# Patient Record
Sex: FEMALE | Race: WHITE | Employment: UNEMPLOYED | URBAN - NONMETROPOLITAN AREA
[De-identification: names, ages, dates, MRNs, and addresses within clinical notes are randomized per-mention and may not be internally consistent; named-entity substitution may affect disease eponyms.]

---

## 2017-09-23 ENCOUNTER — APPOINTMENT (OUTPATIENT)
Dept: CT IMAGING | Age: 32
End: 2017-09-23
Payer: COMMERCIAL

## 2017-09-23 ENCOUNTER — HOSPITAL ENCOUNTER (EMERGENCY)
Age: 32
Discharge: HOME | End: 2017-09-23
Attending: EMERGENCY MEDICINE
Payer: COMMERCIAL

## 2017-09-23 VITALS
DIASTOLIC BLOOD PRESSURE: 66 MMHG | RESPIRATION RATE: 16 BRPM | WEIGHT: 131 LBS | OXYGEN SATURATION: 99 % | TEMPERATURE: 98.3 F | HEIGHT: 65 IN | HEART RATE: 90 BPM | BODY MASS INDEX: 21.83 KG/M2 | SYSTOLIC BLOOD PRESSURE: 101 MMHG

## 2017-09-23 DIAGNOSIS — R10.84 GENERALIZED ABDOMINAL PAIN: ICD-10-CM

## 2017-09-23 DIAGNOSIS — K51.00 ULCERATIVE PANCOLITIS WITHOUT COMPLICATION (HCC): Primary | ICD-10-CM

## 2017-09-23 LAB
ALBUMIN SERPL-MCNC: 3.3 GM/DL (ref 3.5–5)
ALP BLD-CCNC: 81 U/L (ref 46–116)
ALT SERPL-CCNC: 23 U/L (ref 12–78)
AMORPHOUS: ABNORMAL
ANION GAP: 11 MEQ/L (ref 8–16)
AST SERPL-CCNC: 24 U/L (ref 15–37)
BACTERIA: ABNORMAL
BASOPHILS # BLD: 0.6 % (ref 0–3)
BILIRUB SERPL-MCNC: 0.4 MG/DL (ref 0.2–1)
BILIRUBIN URINE: ABNORMAL
BLOOD, URINE: NEGATIVE
BUN BLDV-MCNC: 9 MG/DL (ref 7–18)
CASTS UA: ABNORMAL /LPF
CHARACTER, URINE: ABNORMAL
CHLORIDE BLD-SCNC: 102 MEQ/L (ref 98–107)
CO2: 26 MEQ/L (ref 21–32)
COLOR: YELLOW
CREAT SERPL-MCNC: 1.2 MG/DL (ref 0.6–1.1)
CRYSTALS, UA: ABNORMAL
EOSINOPHILS RELATIVE PERCENT: 14.4 % (ref 0–4)
EPITHELIAL CELLS, UA: ABNORMAL /HPF
GFR, ESTIMATED: 55 ML/MIN/1.73M2
GLUCOSE BLD-MCNC: 90 MG/DL (ref 74–106)
GLUCOSE, URINE: NEGATIVE MG/DL
HCT VFR BLD CALC: 33.2 % (ref 37–47)
HEMOGLOBIN: 10.7 GM/DL (ref 12–16)
ICTOTEST: NEGATIVE
KETONES, URINE: ABNORMAL
LEUKOCYTE ESTERASE, URINE: NEGATIVE
LYMPHOCYTES # BLD: 34.8 % (ref 15–47)
MCH RBC QN AUTO: 27.3 PG (ref 27–31)
MCHC RBC AUTO-ENTMCNC: 32.1 GM/DL (ref 33–37)
MCV RBC AUTO: 85.1 FL (ref 81–99)
MONOCYTES: 11.7 % (ref 0–12)
MUCUS: ABNORMAL
NITRITE, URINE: NEGATIVE
PDW BLD-RTO: 13.4 % (ref 11.5–14.5)
PH UA: 5.5 (ref 5–9)
PLATELET # BLD: 357 THOU/MM3 (ref 130–400)
PMV BLD AUTO: 8.1 MCM (ref 7.4–10.4)
POC CALCIUM: 8.7 MG/DL (ref 8.5–10.1)
POTASSIUM SERPL-SCNC: 3.7 MEQ/L (ref 3.5–5.1)
PREGNANCY, URINE: NEGATIVE
PROTEIN UA: ABNORMAL MG/DL
RBC # BLD: 3.9 MILL/MM3 (ref 4.2–5.4)
RBC # BLD: NORMAL 10*6/UL
RBC UA: ABNORMAL /HPF
REFLEX TO URINE C & S: ABNORMAL
SEGS: 38.5 % (ref 43–75)
SODIUM BLD-SCNC: 139 MEQ/L (ref 136–145)
SPECIFIC GRAVITY UA: >= 1.03 (ref 1–1.03)
TOTAL PROTEIN: 7.1 GM/DL (ref 6.4–8.2)
UROBILINOGEN, URINE: 0.2 EU/DL (ref 0–1)
WBC # BLD: 6 THOU/MM3 (ref 4.8–10.8)
WBC UA: ABNORMAL /HPF

## 2017-09-23 PROCEDURE — 81001 URINALYSIS AUTO W/SCOPE: CPT

## 2017-09-23 PROCEDURE — 85025 COMPLETE CBC W/AUTO DIFF WBC: CPT

## 2017-09-23 PROCEDURE — 74177 CT ABD & PELVIS W/CONTRAST: CPT

## 2017-09-23 PROCEDURE — 96375 TX/PRO/DX INJ NEW DRUG ADDON: CPT

## 2017-09-23 PROCEDURE — 99284 EMERGENCY DEPT VISIT MOD MDM: CPT

## 2017-09-23 PROCEDURE — 80053 COMPREHEN METABOLIC PANEL: CPT

## 2017-09-23 PROCEDURE — 84703 CHORIONIC GONADOTROPIN ASSAY: CPT

## 2017-09-23 PROCEDURE — 96361 HYDRATE IV INFUSION ADD-ON: CPT

## 2017-09-23 PROCEDURE — 6360000002 HC RX W HCPCS: Performed by: EMERGENCY MEDICINE

## 2017-09-23 PROCEDURE — 2580000003 HC RX 258: Performed by: EMERGENCY MEDICINE

## 2017-09-23 PROCEDURE — 87086 URINE CULTURE/COLONY COUNT: CPT

## 2017-09-23 PROCEDURE — 6360000004 HC RX CONTRAST MEDICATION: Performed by: EMERGENCY MEDICINE

## 2017-09-23 PROCEDURE — 96374 THER/PROPH/DIAG INJ IV PUSH: CPT

## 2017-09-23 PROCEDURE — 36415 COLL VENOUS BLD VENIPUNCTURE: CPT

## 2017-09-23 RX ORDER — HYDROCODONE BITARTRATE AND ACETAMINOPHEN 5; 325 MG/1; MG/1
1 TABLET ORAL ONCE
Status: DISCONTINUED | OUTPATIENT
Start: 2017-09-23 | End: 2017-09-23 | Stop reason: HOSPADM

## 2017-09-23 RX ORDER — 0.9 % SODIUM CHLORIDE 0.9 %
1000 INTRAVENOUS SOLUTION INTRAVENOUS ONCE
Status: COMPLETED | OUTPATIENT
Start: 2017-09-23 | End: 2017-09-23

## 2017-09-23 RX ORDER — MORPHINE SULFATE 4 MG/ML
4 INJECTION, SOLUTION INTRAMUSCULAR; INTRAVENOUS ONCE
Status: COMPLETED | OUTPATIENT
Start: 2017-09-23 | End: 2017-09-23

## 2017-09-23 RX ORDER — ONDANSETRON 2 MG/ML
4 INJECTION INTRAMUSCULAR; INTRAVENOUS ONCE
Status: COMPLETED | OUTPATIENT
Start: 2017-09-23 | End: 2017-09-23

## 2017-09-23 RX ADMIN — SODIUM CHLORIDE 1000 ML: 9 INJECTION, SOLUTION INTRAVENOUS at 11:35

## 2017-09-23 RX ADMIN — IOPAMIDOL 100 ML: 755 INJECTION, SOLUTION INTRAVENOUS at 12:52

## 2017-09-23 RX ADMIN — ONDANSETRON 4 MG: 2 INJECTION INTRAMUSCULAR; INTRAVENOUS at 11:37

## 2017-09-23 RX ADMIN — MORPHINE SULFATE 4 MG: 4 INJECTION, SOLUTION INTRAMUSCULAR; INTRAVENOUS at 11:39

## 2017-09-23 RX ADMIN — IOHEXOL 50 ML: 240 INJECTION, SOLUTION INTRATHECAL; INTRAVASCULAR; INTRAVENOUS; ORAL at 12:52

## 2017-09-23 ASSESSMENT — ENCOUNTER SYMPTOMS
BLOOD IN STOOL: 1
SHORTNESS OF BREATH: 0
DIARRHEA: 1
NAUSEA: 0
ABDOMINAL PAIN: 1
WHEEZING: 0
SORE THROAT: 0
VOMITING: 0

## 2017-09-23 ASSESSMENT — PAIN SCALES - GENERAL
PAINLEVEL_OUTOF10: 6

## 2017-09-23 ASSESSMENT — PAIN DESCRIPTION - DESCRIPTORS
DESCRIPTORS: CRAMPING
DESCRIPTORS: CRAMPING;ACHING

## 2017-09-23 ASSESSMENT — PAIN - FUNCTIONAL ASSESSMENT: PAIN_FUNCTIONAL_ASSESSMENT: 0-10

## 2017-09-23 ASSESSMENT — PAIN DESCRIPTION - ORIENTATION
ORIENTATION: RIGHT;LEFT;UPPER
ORIENTATION: LEFT;RIGHT;UPPER

## 2017-09-23 ASSESSMENT — PAIN DESCRIPTION - LOCATION
LOCATION: ABDOMEN
LOCATION: ABDOMEN

## 2017-09-25 LAB
ORGANISM: ABNORMAL
URINE CULTURE REFLEX: ABNORMAL

## 2017-11-10 ENCOUNTER — HOSPITAL ENCOUNTER (OUTPATIENT)
Dept: ULTRASOUND IMAGING | Age: 32
Discharge: HOME | End: 2017-11-10
Payer: COMMERCIAL

## 2017-11-10 DIAGNOSIS — R52 PAIN: ICD-10-CM

## 2017-11-10 LAB
ANISOCYTOSIS: ABNORMAL
BASOPHILIA: SLIGHT
BASOPHILS # BLD: 1 %
BASOPHILS ABSOLUTE: 0.1 THOU/MM3 (ref 0–0.1)
EOSINOPHIL # BLD: 7.6 %
EOSINOPHILS ABSOLUTE: 0.7 THOU/MM3 (ref 0–0.4)
HCT VFR BLD CALC: 26.5 % (ref 37–47)
HEMOGLOBIN: 8.3 GM/DL (ref 12–16)
HYPOCHROMIA: ABNORMAL
LYMPHOCYTES # BLD: 45.2 %
LYMPHOCYTES ABSOLUTE: 3.9 THOU/MM3 (ref 1–4.8)
MCH RBC QN AUTO: 23.6 PG (ref 27–31)
MCHC RBC AUTO-ENTMCNC: 31.5 GM/DL (ref 33–37)
MCV RBC AUTO: 75.1 FL (ref 81–99)
MICROCYTES: ABNORMAL
MONOCYTES # BLD: 10.4 %
MONOCYTES ABSOLUTE: 0.9 THOU/MM3 (ref 0.4–1.3)
NUCLEATED RED BLOOD CELLS: 0 /100 WBC
PDW BLD-RTO: 17.7 % (ref 11.5–14.5)
PLATELET # BLD: 409 THOU/MM3 (ref 130–400)
PLATELET ESTIMATE: ADEQUATE
PMV BLD AUTO: 8.5 MCM (ref 7.4–10.4)
RBC # BLD: 3.53 MILL/MM3 (ref 4.2–5.4)
SCAN OF BLOOD SMEAR: NORMAL
SEG NEUTROPHILS: 35.8 %
SEGMENTED NEUTROPHILS ABSOLUTE COUNT: 3.1 THOU/MM3 (ref 1.8–7.7)
TARGET CELLS: ABNORMAL
TSH SERPL DL<=0.05 MIU/L-ACNC: 1.7 UIU/ML (ref 0.4–4.2)
WBC # BLD: 8.6 THOU/MM3 (ref 4.8–10.8)

## 2017-11-10 PROCEDURE — 36415 COLL VENOUS BLD VENIPUNCTURE: CPT

## 2017-11-10 PROCEDURE — 85025 COMPLETE CBC W/AUTO DIFF WBC: CPT

## 2017-11-10 PROCEDURE — 84443 ASSAY THYROID STIM HORMONE: CPT

## 2017-11-10 PROCEDURE — 76856 US EXAM PELVIC COMPLETE: CPT

## 2017-11-28 ENCOUNTER — HOSPITAL ENCOUNTER (EMERGENCY)
Age: 32
Discharge: HOME | End: 2017-11-29
Attending: FAMILY MEDICINE
Payer: COMMERCIAL

## 2017-11-28 ENCOUNTER — APPOINTMENT (OUTPATIENT)
Dept: GENERAL RADIOLOGY | Age: 32
End: 2017-11-28
Payer: COMMERCIAL

## 2017-11-28 DIAGNOSIS — D64.9 CHRONIC ANEMIA: Primary | ICD-10-CM

## 2017-11-28 LAB
ALBUMIN SERPL-MCNC: 3.1 GM/DL (ref 3.5–5)
ALP BLD-CCNC: 74 U/L (ref 46–116)
ALT SERPL-CCNC: 22 U/L (ref 12–78)
ANION GAP: 9 MEQ/L (ref 8–16)
AST SERPL-CCNC: 20 U/L (ref 15–37)
BILIRUB SERPL-MCNC: 0.2 MG/DL (ref 0.2–1)
BUN BLDV-MCNC: 12 MG/DL (ref 7–18)
CHLORIDE BLD-SCNC: 101 MEQ/L (ref 98–107)
CO2: 26 MEQ/L (ref 21–32)
CREAT SERPL-MCNC: 1.1 MG/DL (ref 0.6–1.1)
EKG ATRIAL RATE: 75 BPM
EKG P AXIS: 67 DEGREES
EKG P-R INTERVAL: 142 MS
EKG Q-T INTERVAL: 404 MS
EKG QRS DURATION: 90 MS
EKG QTC CALCULATION (BAZETT): 451 MS
EKG R AXIS: 45 DEGREES
EKG T AXIS: 54 DEGREES
EKG VENTRICULAR RATE: 75 BPM
GFR, ESTIMATED: 61 ML/MIN/1.73M2
GLUCOSE BLD-MCNC: 88 MG/DL (ref 74–106)
LIPASE: 230 U/L (ref 65–230)
POC CALCIUM: 8.1 MG/DL (ref 8.5–10.1)
POTASSIUM SERPL-SCNC: 3.8 MEQ/L (ref 3.5–5.1)
PREGNANCY, SERUM: NEGATIVE
SODIUM BLD-SCNC: 136 MEQ/L (ref 136–145)
TOTAL PROTEIN: 6.9 GM/DL (ref 6.4–8.2)
TROPONIN I: 0.05 NG/ML

## 2017-11-28 PROCEDURE — 96374 THER/PROPH/DIAG INJ IV PUSH: CPT

## 2017-11-28 PROCEDURE — 93005 ELECTROCARDIOGRAM TRACING: CPT

## 2017-11-28 PROCEDURE — 84703 CHORIONIC GONADOTROPIN ASSAY: CPT

## 2017-11-28 PROCEDURE — 83690 ASSAY OF LIPASE: CPT

## 2017-11-28 PROCEDURE — 36415 COLL VENOUS BLD VENIPUNCTURE: CPT

## 2017-11-28 PROCEDURE — 84484 ASSAY OF TROPONIN QUANT: CPT

## 2017-11-28 PROCEDURE — 6360000002 HC RX W HCPCS: Performed by: FAMILY MEDICINE

## 2017-11-28 PROCEDURE — 80053 COMPREHEN METABOLIC PANEL: CPT

## 2017-11-28 PROCEDURE — 71020 XR CHEST STANDARD TWO VW: CPT

## 2017-11-28 PROCEDURE — 99285 EMERGENCY DEPT VISIT HI MDM: CPT

## 2017-11-28 PROCEDURE — 85025 COMPLETE CBC W/AUTO DIFF WBC: CPT

## 2017-11-28 RX ORDER — DIPHENHYDRAMINE HYDROCHLORIDE 50 MG/ML
50 INJECTION INTRAMUSCULAR; INTRAVENOUS ONCE
Status: COMPLETED | OUTPATIENT
Start: 2017-11-28 | End: 2017-11-28

## 2017-11-28 RX ADMIN — DIPHENHYDRAMINE HYDROCHLORIDE 50 MG: 50 INJECTION, SOLUTION INTRAMUSCULAR; INTRAVENOUS at 23:45

## 2017-11-28 ASSESSMENT — ENCOUNTER SYMPTOMS
EYE DISCHARGE: 0
VOMITING: 0
BACK PAIN: 0
NAUSEA: 0
DIARRHEA: 0
ABDOMINAL PAIN: 0
EYE PAIN: 0
WHEEZING: 0
SORE THROAT: 0
RHINORRHEA: 0
COUGH: 0

## 2017-11-28 ASSESSMENT — PAIN DESCRIPTION - LOCATION: LOCATION: CHEST

## 2017-11-28 ASSESSMENT — PAIN SCALES - GENERAL: PAINLEVEL_OUTOF10: 7

## 2017-11-28 ASSESSMENT — PAIN DESCRIPTION - PAIN TYPE: TYPE: ACUTE PAIN

## 2017-11-29 ENCOUNTER — HOSPITAL ENCOUNTER (EMERGENCY)
Age: 32
Discharge: HOME | End: 2017-11-29
Payer: COMMERCIAL

## 2017-11-29 VITALS
TEMPERATURE: 98.4 F | OXYGEN SATURATION: 100 % | HEART RATE: 84 BPM | SYSTOLIC BLOOD PRESSURE: 117 MMHG | DIASTOLIC BLOOD PRESSURE: 82 MMHG | BODY MASS INDEX: 21.63 KG/M2 | WEIGHT: 130 LBS | RESPIRATION RATE: 18 BRPM

## 2017-11-29 VITALS
DIASTOLIC BLOOD PRESSURE: 83 MMHG | HEIGHT: 65 IN | TEMPERATURE: 98.2 F | HEART RATE: 79 BPM | SYSTOLIC BLOOD PRESSURE: 117 MMHG | RESPIRATION RATE: 14 BRPM | WEIGHT: 125 LBS | BODY MASS INDEX: 20.83 KG/M2 | OXYGEN SATURATION: 100 %

## 2017-11-29 DIAGNOSIS — D50.9 IRON DEFICIENCY ANEMIA, UNSPECIFIED IRON DEFICIENCY ANEMIA TYPE: Primary | ICD-10-CM

## 2017-11-29 LAB
ABO: NORMAL
ALBUMIN SERPL-MCNC: 4.5 G/DL (ref 3.5–5.1)
ALP BLD-CCNC: 87 U/L (ref 38–126)
ALT SERPL-CCNC: 14 U/L (ref 11–66)
ANION GAP SERPL CALCULATED.3IONS-SCNC: 10 MEQ/L (ref 8–16)
ANISOCYTOSIS: ABNORMAL
ANTIBODY SCREEN: NORMAL
AST SERPL-CCNC: 24 U/L (ref 5–40)
BASOPHILIA: SLIGHT
BASOPHILS # BLD: 0.5 % (ref 0–3)
BASOPHILS # BLD: 0.6 %
BASOPHILS ABSOLUTE: 0 THOU/MM3 (ref 0–0.1)
BILIRUB SERPL-MCNC: 0.2 MG/DL (ref 0.3–1.2)
BUN BLDV-MCNC: 13 MG/DL (ref 7–22)
CALCIUM SERPL-MCNC: 9.6 MG/DL (ref 8.5–10.5)
CHLORIDE BLD-SCNC: 100 MEQ/L (ref 98–111)
CO2: 28 MEQ/L (ref 23–33)
CREAT SERPL-MCNC: 1 MG/DL (ref 0.4–1.2)
DIFFERENTIAL, AUTO: ABNORMAL
EKG ATRIAL RATE: 89 BPM
EKG P AXIS: 75 DEGREES
EKG P-R INTERVAL: 140 MS
EKG Q-T INTERVAL: 382 MS
EKG QRS DURATION: 86 MS
EKG QTC CALCULATION (BAZETT): 464 MS
EKG R AXIS: 53 DEGREES
EKG T AXIS: 32 DEGREES
EKG VENTRICULAR RATE: 89 BPM
EOSINOPHIL # BLD: 3.7 %
EOSINOPHILS ABSOLUTE: 0.3 THOU/MM3 (ref 0–0.4)
EOSINOPHILS RELATIVE PERCENT: 6.8 % (ref 0–4)
GFR SERPL CREATININE-BSD FRML MDRD: 64 ML/MIN/1.73M2
GLUCOSE BLD-MCNC: 91 MG/DL (ref 70–108)
GROUP A STREP CULTURE, REFLEX: NEGATIVE
HCT VFR BLD CALC: 23.6 % (ref 37–47)
HCT VFR BLD CALC: 27.5 % (ref 37–47)
HEMOGLOBIN: 7.4 GM/DL (ref 12–16)
HEMOGLOBIN: 8.6 GM/DL (ref 12–16)
HYPOCHROMIA: ABNORMAL
LYMPHOCYTES # BLD: 39.1 %
LYMPHOCYTES # BLD: 56.3 % (ref 15–47)
LYMPHOCYTES ABSOLUTE: 2.7 THOU/MM3 (ref 1–4.8)
MCH RBC QN AUTO: 21.6 PG (ref 27–31)
MCH RBC QN AUTO: 22.1 PG (ref 27–31)
MCHC RBC AUTO-ENTMCNC: 31.1 GM/DL (ref 33–37)
MCHC RBC AUTO-ENTMCNC: 31.3 GM/DL (ref 33–37)
MCV RBC AUTO: 69.2 FL (ref 81–99)
MCV RBC AUTO: 70.8 FL (ref 81–99)
MICROCYTES: ABNORMAL
MONOCYTES # BLD: 8.6 %
MONOCYTES ABSOLUTE: 0.6 THOU/MM3 (ref 0.4–1.3)
MONOCYTES: 8.9 % (ref 0–12)
NUCLEATED RED BLOOD CELLS: 0 /100 WBC
OSMOLALITY CALCULATION: 275.4 MOSMOL/KG (ref 275–300)
PATHOLOGIST REVIEW: ABNORMAL
PDW BLD-RTO: 14.9 % (ref 11.5–14.5)
PDW BLD-RTO: 18.5 % (ref 11.5–14.5)
PLATELET # BLD: 383 THOU/MM3 (ref 130–400)
PLATELET # BLD: 448 THOU/MM3 (ref 130–400)
PLATELET ESTIMATE: ABNORMAL
PLATELET ESTIMATE: ADEQUATE
PMV BLD AUTO: 7.9 MCM (ref 7.4–10.4)
PMV BLD AUTO: 8.6 MCM (ref 7.4–10.4)
POTASSIUM SERPL-SCNC: 4.2 MEQ/L (ref 3.5–5.2)
RBC # BLD: 3.41 MILL/MM3 (ref 4.2–5.4)
RBC # BLD: 3.88 MILL/MM3 (ref 4.2–5.4)
RBC # BLD: ABNORMAL 10*6/UL
REFLEX THROAT C + S: NORMAL
RH FACTOR: NORMAL
SCAN OF BLOOD SMEAR: NORMAL
SCAN OF BLOOD SMEAR: NORMAL
SEG NEUTROPHILS: 48 %
SEGMENTED NEUTROPHILS ABSOLUTE COUNT: 3.4 THOU/MM3 (ref 1.8–7.7)
SEGS: 27.5 % (ref 43–75)
SODIUM BLD-SCNC: 138 MEQ/L (ref 135–145)
TARGET CELLS: ABNORMAL
TOTAL PROTEIN: 7.9 G/DL (ref 6.1–8)
WBC # BLD: 6.1 THOU/MM3 (ref 4.8–10.8)
WBC # BLD: 7 THOU/MM3 (ref 4.8–10.8)

## 2017-11-29 PROCEDURE — 85025 COMPLETE CBC W/AUTO DIFF WBC: CPT

## 2017-11-29 PROCEDURE — 93005 ELECTROCARDIOGRAM TRACING: CPT

## 2017-11-29 PROCEDURE — 36415 COLL VENOUS BLD VENIPUNCTURE: CPT

## 2017-11-29 PROCEDURE — 86923 COMPATIBILITY TEST ELECTRIC: CPT

## 2017-11-29 PROCEDURE — 86901 BLOOD TYPING SEROLOGIC RH(D): CPT

## 2017-11-29 PROCEDURE — 87880 STREP A ASSAY W/OPTIC: CPT

## 2017-11-29 PROCEDURE — 87070 CULTURE OTHR SPECIMN AEROBIC: CPT

## 2017-11-29 PROCEDURE — 80053 COMPREHEN METABOLIC PANEL: CPT

## 2017-11-29 PROCEDURE — 86900 BLOOD TYPING SEROLOGIC ABO: CPT

## 2017-11-29 PROCEDURE — 36430 TRANSFUSION BLD/BLD COMPNT: CPT

## 2017-11-29 PROCEDURE — P9016 RBC LEUKOCYTES REDUCED: HCPCS

## 2017-11-29 PROCEDURE — 99284 EMERGENCY DEPT VISIT MOD MDM: CPT

## 2017-11-29 PROCEDURE — 86850 RBC ANTIBODY SCREEN: CPT

## 2017-11-29 RX ORDER — SODIUM CHLORIDE 0.9 % (FLUSH) 0.9 %
10 SYRINGE (ML) INJECTION PRN
Status: DISCONTINUED | OUTPATIENT
Start: 2017-11-29 | End: 2017-11-29 | Stop reason: HOSPADM

## 2017-11-29 RX ORDER — 0.9 % SODIUM CHLORIDE 0.9 %
250 INTRAVENOUS SOLUTION INTRAVENOUS ONCE
Status: DISCONTINUED | OUTPATIENT
Start: 2017-11-29 | End: 2017-11-29 | Stop reason: HOSPADM

## 2017-11-29 RX ORDER — SODIUM CHLORIDE 0.9 % (FLUSH) 0.9 %
10 SYRINGE (ML) INJECTION EVERY 12 HOURS
Status: DISCONTINUED | OUTPATIENT
Start: 2017-11-29 | End: 2017-11-29 | Stop reason: HOSPADM

## 2017-11-29 ASSESSMENT — ENCOUNTER SYMPTOMS
SHORTNESS OF BREATH: 1
BACK PAIN: 0
SHORTNESS OF BREATH: 1
CONSTIPATION: 0
EYE DISCHARGE: 0
ABDOMINAL PAIN: 0
EYE PAIN: 0
RHINORRHEA: 0
SORE THROAT: 0
BLOOD IN STOOL: 1
WHEEZING: 0
NAUSEA: 0
COUGH: 0
DIARRHEA: 0
VOMITING: 0

## 2017-11-29 ASSESSMENT — PAIN SCALES - GENERAL: PAINLEVEL_OUTOF10: 0

## 2017-11-29 NOTE — ED PROVIDER NOTES
[facility] EMERGENCY DEPT      CHIEF COMPLAINT       Chief Complaint   Patient presents with    Shortness of Breath    Other     hemoglobin 7.4       Nurses Notes reviewed and I agree except as noted in the HPI. HISTORY OF PRESENT ILLNESS    Anny Vazquez is a 28 y.o. female who presents to the ED for the evaluation of shortness of breath, Lightheadedness, and palpitations for the past month. Patient was seen for similar symptoms last evening at Urgent Care. Patient states that she has a history of chronic anemia and ulcerative colitis. She states that she went to Urgent Care last evening for shortness of breath, and that her hemoglobin was found to be 7.4 and her hematocrit was 23.6. This is a 1.0 were then 1 month ago. She reports that her hemoglobin is usually 10-11. Patient states that the Urgent Care doctor advised her to come to the ED last evening for a transfusion, but that she Could not due to childcare issues. She denies experiencing any chest pain, abdominal pain, bleeding, headache, fever, or weakness, but she does report that she has chronic hematochezia. She states that her last transfusion was in 2016 following giving birth to her last child. Patient reports that she believes that her began to experience symptoms following a Crohn's flare where she experienced more hematochezia than usual. She reports that her is managed at [address] [place] [address] for her ulcerative colitis, and that she was referred to a hematologist, but that her appointment is not until 12/22/17. She states that she's not pregnant because she is still nursing and has not menstruated since she gave birth. Patient denies further complaints at initial encounter. Location/Symptom: Shortness of breath and lightheadedness  Timing/Onset: Yesterday  Context/Setting: Patient has a history of chronic anemia and was advised to come to the ED for a blood transfusion.     REVIEW OF SYSTEMS     Review of Systems   Constitutional: Negative for appetite Exam   Constitutional: She is oriented to person, place, and time. Vital signs are normal. She appears well-developed and well-nourished. She is active and cooperative. Non-toxic appearance. No distress. HENT:   Head: Normocephalic and atraumatic. Right Ear: Hearing normal.   Left Ear: Hearing normal.   Nose: Nose normal. No rhinorrhea. Mouth/Throat: Uvula is midline, oropharynx is clear and moist and mucous membranes are normal. No oropharyngeal exudate. Eyes: Conjunctivae, EOM and lids are normal. Pupils are equal, round, and reactive to light. Right eye exhibits no exudate. Left eye exhibits no exudate. No scleral icterus. Neck: Normal range of motion. Neck supple. No neck rigidity. No tracheal deviation present. Cardiovascular: Normal rate, normal heart sounds, intact distal pulses and normal pulses. No murmur heard. Pulses:       Radial pulses are 2+ on the right side. Pulmonary/Chest: Effort normal and breath sounds normal. No accessory muscle usage or stridor. No respiratory distress. She has no decreased breath sounds. She has no wheezes. She has no rhonchi. She has no rales. Abdominal: Soft. Normal appearance. She exhibits no distension. There is no tenderness. There is no rigidity, no rebound and no guarding. Musculoskeletal: Normal range of motion. She exhibits no edema. Movement normal as observed   Lymphadenopathy:     She has no cervical adenopathy. Neurological: She is alert and oriented to person, place, and time. She has normal strength. She is not disoriented. No cranial nerve deficit. Gait normal. GCS eye subscore is 4. GCS verbal subscore is 5. GCS motor subscore is 6. No gross deficits observed   Skin: Skin is warm, dry and intact. No rash noted. She is not diaphoretic. There is pallor. Psychiatric: She has a normal mood and affect. Her speech is normal and behavior is normal. Thought content normal.   Nursing note and vitals reviewed.       DIFFERENTIAL DIAGNOSIS: but pale. Old records were reviewed. Within the department, I observed the patient's vital signs to be within acceptable range. Radiological studies Were not deemed necessary. Laboratory work was reassuring Showing improved hemoglobin at 8.6. Within the department, the patient was treated with Transfusion of packed red blood cells. I observed the patient's condition to modestly improve during the duration of their stay. I have considered  Arrhythmia and this patient's presentation is not consistent with such entities and therefore, no further testing was warranted. The patient was comfortable with the plan of discharge home and to follow up with Hernia hematologist at [address] [place] [address] and her PCP Dr. Cedillo, The latter in regards to scheduling an iron transfusion. Anticipatory guidance was given. The patient is instructed to return to the emergency department for any worsening of their symptoms. Patient was discharged from the emergency department in good condition with all questions answered. See disposition below. I have given the patient strict written and verbal instructions about care at home, follow-up, and signs and symptoms of worsening of condition and they did verbalize understanding. CRITICAL CARE:   None    CONSULTS:  None    PROCEDURES:  None    FINAL IMPRESSION      1. Iron deficiency anemia, unspecified iron deficiency anemia type          DISPOSITION/PLAN     1.  Iron deficiency anemia, unspecified iron deficiency anemia type        PATIENT REFERRED TO:  Damian Tatum MD  [address]  [phone]    Schedule an appointment as soon as possible for a visit   Discuss outpatient iron transfusion    your rheumatologist  [facility]    as scheduled sooner if worse      DISCHARGE MEDICATIONS:  Discharge Medication List as of 11/29/2017  3:26 PM          (Please note that portions of this note were completed with a voice recognition program.  Efforts were made to edit the

## 2017-11-29 NOTE — ED TRIAGE NOTES
Pt presents to  16 c/o SOB and being sent here from [place] ambulatory care. Pt was seen last night and her hemoglobin was 7.4 and pt was told to report here today to be rechecked and have a blood transfusion. PA student at bedside. VSS. Family at bedside. Call light in reach. Will monitor.

## 2017-11-29 NOTE — ED PROVIDER NOTES
nsaids. FAMILY HISTORY     has no family status information on file. family history is not on file. SOCIAL HISTORY      reports that she has never smoked. She has never used smokeless tobacco. She reports that she does not drink alcohol or use drugs. PHYSICAL EXAM     INITIAL VITALS:  weight is 130 lb (59 kg). Her oral temperature is 98.4 °F (36.9 °C). Her blood pressure is 125/86 and her pulse is 85. Her respiration is 20 and oxygen saturation is 100%. Physical Exam   Constitutional: She is oriented to person, place, and time. She appears well-developed and well-nourished. HENT:   Head: Normocephalic and atraumatic. Right Ear: External ear normal.   Left Ear: External ear normal.   Eyes: Conjunctivae are normal. Right eye exhibits no discharge. Left eye exhibits no discharge. No scleral icterus. Neck: Normal range of motion. No JVD present. No tracheal deviation present. No thyromegaly present. Cardiovascular: Normal rate and regular rhythm. Exam reveals no gallop and no friction rub. No murmur heard. Pulmonary/Chest: Effort normal and breath sounds normal. No stridor. No respiratory distress. She has no wheezes. She has no rales. Abdominal: Soft. She exhibits no distension. There is no tenderness. There is no rebound and no guarding. Musculoskeletal: Normal range of motion. She exhibits no edema or tenderness. Neurological: She is alert and oriented to person, place, and time. Coordination normal.   Skin: Skin is warm and dry. No rash (On exposed body surfaces) noted. She is not diaphoretic. Psychiatric: She has a normal mood and affect. Her behavior is normal. Thought content normal.   Nursing note and vitals reviewed.       DIFFERENTIAL DIAGNOSIS:   Chronic anemia    DIAGNOSTIC RESULTS     EKG: All EKG's are interpreted by the Emergency Department Physician who either signs or Co-signs this chart in the absence of a cardiologist.  EKG    The patient had an EKG which is OF BLOOD SMEAR       EMERGENCY DEPARTMENT COURSE:   Vitals:    Vitals:    11/28/17 2307 11/28/17 2313 11/28/17 2347 11/29/17 0015   BP: 116/78 114/72 112/69 125/86   Pulse: 77 77 78 85   Resp: 20      Temp: 98.4 °F (36.9 °C)      TempSrc: Oral      SpO2: 97% 100% 100% 100%   Weight: 130 lb (59 kg)        Patient seen and evaluated with a history of chronic anemia likely secondary to ulcerative colitis. Hemoglobin is found to be 7.4. She is having symptoms. She was offered transfer to [facility] for transfusion. She states that she would prefer to go in the morning. Patient discharged and advised to follow-up at the emergency department in the morning. CRITICAL CARE: There was a high probability of clinically significant/life threatening deterioration in this patient's condition which required my urgent intervention. Total critical care time was none minutes. This excludes any time for separately reportable procedures. CONSULTS:  None    PROCEDURES:  none    FINAL IMPRESSION      1.  Chronic anemia          DISPOSITION/PLAN   Discharge    PATIENT REFERRED TO:  Kaz Elise  [address] [place] [address]  [phone]          DISCHARGE MEDICATIONS:  New Prescriptions    No medications on file       (Please note that portions of this note were completed with a voice recognition program.  Efforts were made to edit the dictations but occasionally words are mis-transcribed.)    MD Marek Craig MD  11/29/17 0709

## 2017-11-29 NOTE — ED TRIAGE NOTES
Pt c/o rapid heartrate along with hx of anemia, states possible allergic reaction to new live Symtext tree, throat is sore/scratchy, pt skin w/d, resp easy, lungs clear t/o bilaterally, heart sounds regular, no edema noted, abd soft/non-tender, no acute distress noted

## 2017-12-01 LAB — THROAT/NOSE CULTURE: NORMAL

## 2018-06-26 ENCOUNTER — HOSPITAL ENCOUNTER (OUTPATIENT)
Dept: GENERAL RADIOLOGY | Age: 33
Discharge: HOME | End: 2018-06-26
Payer: COMMERCIAL

## 2018-06-26 ENCOUNTER — HOSPITAL ENCOUNTER (OUTPATIENT)
Age: 33
Discharge: HOME | End: 2018-06-26
Payer: COMMERCIAL

## 2018-06-26 DIAGNOSIS — M25.552 LEFT HIP PAIN: ICD-10-CM

## 2018-06-26 PROCEDURE — 73502 X-RAY EXAM HIP UNI 2-3 VIEWS: CPT

## 2018-06-28 ENCOUNTER — HOSPITAL ENCOUNTER (OUTPATIENT)
Dept: MRI IMAGING | Age: 33
Discharge: HOME | End: 2018-06-28
Payer: COMMERCIAL

## 2018-06-28 DIAGNOSIS — R22.42 HIP REGION MASS, LEFT: ICD-10-CM

## 2018-06-28 DIAGNOSIS — M76.892 ENTHESOPATHY OF LEFT HIP REGION: ICD-10-CM

## 2018-06-28 PROCEDURE — A9579 GAD-BASE MR CONTRAST NOS,1ML: HCPCS | Performed by: ORTHOPAEDIC SURGERY

## 2018-06-28 PROCEDURE — 73720 MRI LWR EXTREMITY W/O&W/DYE: CPT

## 2018-06-28 PROCEDURE — 6360000004 HC RX CONTRAST MEDICATION: Performed by: ORTHOPAEDIC SURGERY

## 2018-06-28 RX ADMIN — GADOTERIDOL 15 ML: 279.3 INJECTION, SOLUTION INTRAVENOUS at 09:47

## 2018-08-01 ENCOUNTER — TELEPHONE (OUTPATIENT)
Dept: SURGERY | Age: 33
End: 2018-08-01

## 2018-10-25 ENCOUNTER — HOSPITAL ENCOUNTER (OUTPATIENT)
Dept: GENERAL RADIOLOGY | Age: 33
Discharge: HOME | End: 2018-10-25
Payer: COMMERCIAL

## 2018-10-25 PROCEDURE — 93225 XTRNL ECG REC<48 HRS REC: CPT

## 2018-10-25 PROCEDURE — 93226 XTRNL ECG REC<48 HR SCAN A/R: CPT

## 2018-11-01 NOTE — PROCEDURES
[address] [place] [address]                                 HOLTER MONITOR    PATIENT NAME: Casie Echeverria                  :        1985  MED REC NO:   [id number]                           ROOM:  ACCOUNT NO:   [de-identified]                           ADMIT DATE: 10/25/2018  PROVIDER:     Jessica Nieves. Radha Toscano M.D.      Erum Hernandezman:  10/25/2018    HOLTER MONITOR 24-HOURS    DURATION:  24 hours. QUALITY:  Reasonable. INDICATION:  Ventricular ectopic beats. FINDINGS:  The patient is in normal sinus rhythm. Average heart rate of  93 beats per minute, ranging from 56 to 164 beats per minute. Maximum R  to R interval is 1.3 seconds at 01:00 a.m. Nine supraventricular  ectopic beats noted, all isolated. 164 ventricular ectopic beats has  been noted of which 162 isolated, one couplet, no ventricular  tachycardia, no supraventricular tachycardia, no other form of  arrhythmia. The patient was complaining of lightheadedness at one time. At the  time, the patient does correlate with normal sinus rhythm with normal  heart rate. CONCLUSION:  This is a benign Holter monitor finding with normal sinus  rhythm. Average heart rate 93 beats per minute, ranging from 56 to 164  beats per minute. No significant pause of more than 1.3 seconds noted. Rare supraventricular ectopic beats, total of 9, all isolated. Rare ventricular ectopic beats 164, isolated and 1 couplet. No  ventricular tachycardia. No supraventricular tachycardia. No atrial  fibrillation. No other form of arrhythmia. The lightheadedness  correlate with normal sinus rhythm. Tonja Burden.  Kamryn Cheema M.D.    D: 10/31/2018 13:00:43       T: 10/31/2018 14:22:37     [email or web address]  Job#: [id number]     Doc#: [id number]    CC:

## 2018-11-29 ENCOUNTER — HOSPITAL ENCOUNTER (OUTPATIENT)
Age: 33
Discharge: HOME | DRG: 386 | End: 2018-11-29
Payer: COMMERCIAL

## 2018-11-29 ENCOUNTER — HOSPITAL ENCOUNTER (INPATIENT)
Age: 33
LOS: 1 days | Discharge: HOME | DRG: 386 | End: 2018-12-01
Attending: FAMILY MEDICINE | Admitting: FAMILY MEDICINE
Payer: COMMERCIAL

## 2018-11-29 DIAGNOSIS — K51.811 OTHER ULCERATIVE COLITIS WITH RECTAL BLEEDING (HCC): ICD-10-CM

## 2018-11-29 DIAGNOSIS — K62.5 RECTAL BLEEDING: Primary | ICD-10-CM

## 2018-11-29 DIAGNOSIS — D64.9 ANEMIA, UNSPECIFIED TYPE: ICD-10-CM

## 2018-11-29 LAB
ABSOLUTE RETIC #: 57 THOU/MM3 (ref 20–115)
ANION GAP SERPL CALCULATED.3IONS-SCNC: 13 MEQ/L (ref 8–16)
BUN BLDV-MCNC: 6 MG/DL (ref 7–22)
CALCIUM SERPL-MCNC: 8.9 MG/DL (ref 8.5–10.5)
CHLORIDE BLD-SCNC: 99 MEQ/L (ref 98–111)
CO2: 25 MEQ/L (ref 23–33)
CREAT SERPL-MCNC: 0.8 MG/DL (ref 0.4–1.2)
FERRITIN: 5 NG/ML (ref 10–291)
GFR SERPL CREATININE-BSD FRML MDRD: 82 ML/MIN/1.73M2
GLUCOSE BLD-MCNC: 70 MG/DL (ref 70–108)
IMMATURE RETIC FRACT: 9.1 % (ref 3–15.9)
IRON SATURATION: 2 % (ref 20–50)
IRON: 9 UG/DL (ref 50–170)
MAGNESIUM: 2.2 MG/DL (ref 1.6–2.4)
POTASSIUM SERPL-SCNC: 3.7 MEQ/L (ref 3.5–5.2)
RETIC HEMOGLOBIN: 15.4 PG (ref 28.2–35.7)
RETICULOCYTE ABSOLUTE COUNT: 2.1 % (ref 0.5–2)
SCAN OF BLOOD SMEAR: NORMAL
SEDIMENTATION RATE, ERYTHROCYTE: 50 MM/HR (ref 0–20)
SODIUM BLD-SCNC: 137 MEQ/L (ref 135–145)
T4 FREE: 1.4 NG/DL (ref 0.93–1.76)
TOTAL IRON BINDING CAPACITY: 371 UG/DL (ref 171–450)
TSH SERPL DL<=0.05 MIU/L-ACNC: 1.04 UIU/ML (ref 0.4–4.2)

## 2018-11-29 PROCEDURE — 83550 IRON BINDING TEST: CPT

## 2018-11-29 PROCEDURE — 84443 ASSAY THYROID STIM HORMONE: CPT

## 2018-11-29 PROCEDURE — 85651 RBC SED RATE NONAUTOMATED: CPT

## 2018-11-29 PROCEDURE — 83735 ASSAY OF MAGNESIUM: CPT

## 2018-11-29 PROCEDURE — 83540 ASSAY OF IRON: CPT

## 2018-11-29 PROCEDURE — 99285 EMERGENCY DEPT VISIT HI MDM: CPT

## 2018-11-29 PROCEDURE — 84439 ASSAY OF FREE THYROXINE: CPT

## 2018-11-29 PROCEDURE — 36415 COLL VENOUS BLD VENIPUNCTURE: CPT

## 2018-11-29 PROCEDURE — 80048 BASIC METABOLIC PNL TOTAL CA: CPT

## 2018-11-29 PROCEDURE — 85046 RETICYTE/HGB CONCENTRATE: CPT

## 2018-11-29 PROCEDURE — 82728 ASSAY OF FERRITIN: CPT

## 2018-11-29 PROCEDURE — 84466 ASSAY OF TRANSFERRIN: CPT

## 2018-11-29 PROCEDURE — 85025 COMPLETE CBC W/AUTO DIFF WBC: CPT

## 2018-11-30 ENCOUNTER — APPOINTMENT (OUTPATIENT)
Dept: CT IMAGING | Age: 33
DRG: 386 | End: 2018-11-30
Payer: COMMERCIAL

## 2018-11-30 ENCOUNTER — APPOINTMENT (OUTPATIENT)
Dept: GENERAL RADIOLOGY | Age: 33
DRG: 386 | End: 2018-11-30
Payer: COMMERCIAL

## 2018-11-30 PROBLEM — R06.02 SHORTNESS OF BREATH: Status: ACTIVE | Noted: 2018-11-30

## 2018-11-30 PROBLEM — R53.83 FATIGUE: Status: ACTIVE | Noted: 2018-11-30

## 2018-11-30 PROBLEM — K51.90 ULCERATIVE COLITIS (HCC): Status: ACTIVE | Noted: 2018-11-30

## 2018-11-30 PROBLEM — K62.5 RECTAL BLEEDING: Status: ACTIVE | Noted: 2018-11-30

## 2018-11-30 LAB
ABO: NORMAL
ALBUMIN SERPL-MCNC: 3.7 G/DL (ref 3.5–5.1)
ALP BLD-CCNC: 60 U/L (ref 38–126)
ALT SERPL-CCNC: 10 U/L (ref 11–66)
AMPHETAMINE+METHAMPHETAMINE URINE SCREEN: NEGATIVE
ANION GAP SERPL CALCULATED.3IONS-SCNC: 10 MEQ/L (ref 8–16)
ANTIBODY SCREEN: NORMAL
AST SERPL-CCNC: 18 U/L (ref 5–40)
BARBITURATE QUANTITATIVE URINE: NEGATIVE
BASOPHILIA: ABNORMAL
BASOPHILIC STIPPLING: SLIGHT
BASOPHILS # BLD: 0.6 %
BASOPHILS # BLD: 0.9 %
BASOPHILS ABSOLUTE: 0.1 THOU/MM3 (ref 0–0.1)
BASOPHILS ABSOLUTE: 0.1 THOU/MM3 (ref 0–0.1)
BENZODIAZEPINE QUANTITATIVE URINE: NEGATIVE
BILIRUB SERPL-MCNC: 0.2 MG/DL (ref 0.3–1.2)
BUN BLDV-MCNC: 10 MG/DL (ref 7–22)
CALCIUM SERPL-MCNC: 8.6 MG/DL (ref 8.5–10.5)
CANNABINOID QUANTITATIVE URINE: NEGATIVE
CHLORIDE BLD-SCNC: 101 MEQ/L (ref 98–111)
CO2: 25 MEQ/L (ref 23–33)
COCAINE METABOLITE QUANTITATIVE URINE: NEGATIVE
CREAT SERPL-MCNC: 1 MG/DL (ref 0.4–1.2)
EKG ATRIAL RATE: 79 BPM
EKG P AXIS: 73 DEGREES
EKG P-R INTERVAL: 150 MS
EKG Q-T INTERVAL: 372 MS
EKG QRS DURATION: 90 MS
EKG QTC CALCULATION (BAZETT): 426 MS
EKG R AXIS: 50 DEGREES
EKG T AXIS: 41 DEGREES
EKG VENTRICULAR RATE: 79 BPM
EOSINOPHIL # BLD: 6.1 %
EOSINOPHIL # BLD: 6.7 %
EOSINOPHILS ABSOLUTE: 0.4 THOU/MM3 (ref 0–0.4)
EOSINOPHILS ABSOLUTE: 0.5 THOU/MM3 (ref 0–0.4)
ERYTHROCYTE [DISTWIDTH] IN BLOOD BY AUTOMATED COUNT: 16.1 % (ref 11.5–14.5)
ERYTHROCYTE [DISTWIDTH] IN BLOOD BY AUTOMATED COUNT: 16.1 % (ref 11.5–14.5)
ERYTHROCYTE [DISTWIDTH] IN BLOOD BY AUTOMATED COUNT: 44.5 FL (ref 35–45)
ERYTHROCYTE [DISTWIDTH] IN BLOOD BY AUTOMATED COUNT: 45.9 FL (ref 35–45)
FECAL LEUKOCYTES: NORMAL
GFR SERPL CREATININE-BSD FRML MDRD: 64 ML/MIN/1.73M2
GLUCOSE BLD-MCNC: 128 MG/DL (ref 70–108)
HCT VFR BLD CALC: 20.8 % (ref 37–47)
HCT VFR BLD CALC: 21.2 % (ref 37–47)
HCT VFR BLD CALC: 28.4 % (ref 37–47)
HCT VFR BLD CALC: 28.8 % (ref 37–47)
HEMOCCULT STL QL: NEGATIVE
HEMOGLOBIN: 6.1 GM/DL (ref 12–16)
HEMOGLOBIN: 6.1 GM/DL (ref 12–16)
HEMOGLOBIN: 8.6 GM/DL (ref 12–16)
HEMOGLOBIN: 8.9 GM/DL (ref 12–16)
HYPOCHROMIA: PRESENT
IMMATURE GRANS (ABS): 0.02 THOU/MM3 (ref 0–0.07)
IMMATURE GRANS (ABS): 0.05 THOU/MM3 (ref 0–0.07)
IMMATURE GRANULOCYTES: 0.3 %
IMMATURE GRANULOCYTES: 0.6 %
LACTIC ACID: 1.3 MMOL/L (ref 0.5–2.2)
LIPASE: 43.3 U/L (ref 5.6–51.3)
LYMPHOCYTES # BLD: 34 %
LYMPHOCYTES # BLD: 44.2 %
LYMPHOCYTES ABSOLUTE: 3 THOU/MM3 (ref 1–4.8)
LYMPHOCYTES ABSOLUTE: 3 THOU/MM3 (ref 1–4.8)
MCH RBC QN AUTO: 22.4 PG (ref 26–33)
MCH RBC QN AUTO: 22.4 PG (ref 26–33)
MCHC RBC AUTO-ENTMCNC: 28.8 GM/DL (ref 32.2–35.5)
MCHC RBC AUTO-ENTMCNC: 29.3 GM/DL (ref 32.2–35.5)
MCV RBC AUTO: 76.5 FL (ref 81–99)
MCV RBC AUTO: 77.9 FL (ref 81–99)
MONOCYTES # BLD: 12.9 %
MONOCYTES # BLD: 8.9 %
MONOCYTES ABSOLUTE: 0.8 THOU/MM3 (ref 0.4–1.3)
MONOCYTES ABSOLUTE: 0.9 THOU/MM3 (ref 0.4–1.3)
NUCLEATED RED BLOOD CELLS: 0 /100 WBC
NUCLEATED RED BLOOD CELLS: 0 /100 WBC
OPIATES, URINE: NEGATIVE
OXYCODONE: NEGATIVE
PATHOLOGIST REVIEW: ABNORMAL
PHENCYCLIDINE QUANTITATIVE URINE: NEGATIVE
PLATELET # BLD: 521 THOU/MM3 (ref 130–400)
PLATELET # BLD: 558 THOU/MM3 (ref 130–400)
PMV BLD AUTO: 9.6 FL (ref 9.4–12.4)
PMV BLD AUTO: 9.9 FL (ref 9.4–12.4)
POTASSIUM SERPL-SCNC: 3.9 MEQ/L (ref 3.5–5.2)
PRO-BNP: 43.3 PG/ML (ref 0–450)
RBC # BLD: 2.72 MILL/MM3 (ref 4.2–5.4)
RBC # BLD: 2.72 MILL/MM3 (ref 4.2–5.4)
RH FACTOR: NORMAL
SEG NEUTROPHILS: 35 %
SEG NEUTROPHILS: 49.8 %
SEGMENTED NEUTROPHILS ABSOLUTE COUNT: 2.3 THOU/MM3 (ref 1.8–7.7)
SEGMENTED NEUTROPHILS ABSOLUTE COUNT: 4.3 THOU/MM3 (ref 1.8–7.7)
SODIUM BLD-SCNC: 136 MEQ/L (ref 135–145)
TARGET CELLS: ABNORMAL
TOTAL PROTEIN: 6.4 G/DL (ref 6.1–8)
TROPONIN T: < 0.01 NG/ML
WBC # BLD: 6.7 THOU/MM3 (ref 4.8–10.8)
WBC # BLD: 8.7 THOU/MM3 (ref 4.8–10.8)

## 2018-11-30 PROCEDURE — 80307 DRUG TEST PRSMV CHEM ANLYZR: CPT

## 2018-11-30 PROCEDURE — 85018 HEMOGLOBIN: CPT

## 2018-11-30 PROCEDURE — 74018 RADEX ABDOMEN 1 VIEW: CPT

## 2018-11-30 PROCEDURE — 83993 ASSAY FOR CALPROTECTIN FECAL: CPT

## 2018-11-30 PROCEDURE — 84484 ASSAY OF TROPONIN QUANT: CPT

## 2018-11-30 PROCEDURE — 82272 OCCULT BLD FECES 1-3 TESTS: CPT

## 2018-11-30 PROCEDURE — 36430 TRANSFUSION BLD/BLD COMPNT: CPT

## 2018-11-30 PROCEDURE — 6360000004 HC RX CONTRAST MEDICATION: Performed by: FAMILY MEDICINE

## 2018-11-30 PROCEDURE — 86901 BLOOD TYPING SEROLOGIC RH(D): CPT

## 2018-11-30 PROCEDURE — 86900 BLOOD TYPING SEROLOGIC ABO: CPT

## 2018-11-30 PROCEDURE — 36415 COLL VENOUS BLD VENIPUNCTURE: CPT

## 2018-11-30 PROCEDURE — 89055 LEUKOCYTE ASSESSMENT FECAL: CPT

## 2018-11-30 PROCEDURE — 93005 ELECTROCARDIOGRAM TRACING: CPT | Performed by: NURSE PRACTITIONER

## 2018-11-30 PROCEDURE — 2709999900 HC NON-CHARGEABLE SUPPLY

## 2018-11-30 PROCEDURE — 2500000003 HC RX 250 WO HCPCS: Performed by: NURSE PRACTITIONER

## 2018-11-30 PROCEDURE — 99222 1ST HOSP IP/OBS MODERATE 55: CPT | Performed by: FAMILY MEDICINE

## 2018-11-30 PROCEDURE — 86923 COMPATIBILITY TEST ELECTRIC: CPT

## 2018-11-30 PROCEDURE — 85025 COMPLETE CBC W/AUTO DIFF WBC: CPT

## 2018-11-30 PROCEDURE — 74177 CT ABD & PELVIS W/CONTRAST: CPT

## 2018-11-30 PROCEDURE — 87427 SHIGA-LIKE TOXIN AG IA: CPT

## 2018-11-30 PROCEDURE — 6370000000 HC RX 637 (ALT 250 FOR IP): Performed by: FAMILY MEDICINE

## 2018-11-30 PROCEDURE — 96374 THER/PROPH/DIAG INJ IV PUSH: CPT

## 2018-11-30 PROCEDURE — 87045 FECES CULTURE AEROBIC BACT: CPT

## 2018-11-30 PROCEDURE — 1200000003 HC TELEMETRY R&B

## 2018-11-30 PROCEDURE — 93010 ELECTROCARDIOGRAM REPORT: CPT | Performed by: INTERNAL MEDICINE

## 2018-11-30 PROCEDURE — 83880 ASSAY OF NATRIURETIC PEPTIDE: CPT

## 2018-11-30 PROCEDURE — 83605 ASSAY OF LACTIC ACID: CPT

## 2018-11-30 PROCEDURE — C9113 INJ PANTOPRAZOLE SODIUM, VIA: HCPCS | Performed by: FAMILY MEDICINE

## 2018-11-30 PROCEDURE — 6360000002 HC RX W HCPCS: Performed by: FAMILY MEDICINE

## 2018-11-30 PROCEDURE — P9016 RBC LEUKOCYTES REDUCED: HCPCS

## 2018-11-30 PROCEDURE — 2580000003 HC RX 258: Performed by: FAMILY MEDICINE

## 2018-11-30 PROCEDURE — 6360000002 HC RX W HCPCS: Performed by: NURSE PRACTITIONER

## 2018-11-30 PROCEDURE — 86850 RBC ANTIBODY SCREEN: CPT

## 2018-11-30 PROCEDURE — 83690 ASSAY OF LIPASE: CPT

## 2018-11-30 PROCEDURE — 80053 COMPREHEN METABOLIC PANEL: CPT

## 2018-11-30 PROCEDURE — 2580000003 HC RX 258: Performed by: INTERNAL MEDICINE

## 2018-11-30 PROCEDURE — 6370000000 HC RX 637 (ALT 250 FOR IP): Performed by: INTERNAL MEDICINE

## 2018-11-30 PROCEDURE — 99999 PR OFFICE/OUTPT VISIT,PROCEDURE ONLY: CPT | Performed by: FAMILY MEDICINE

## 2018-11-30 PROCEDURE — 6360000002 HC RX W HCPCS: Performed by: INTERNAL MEDICINE

## 2018-11-30 PROCEDURE — 85014 HEMATOCRIT: CPT

## 2018-11-30 RX ORDER — LORAZEPAM 2 MG/ML
0.5 INJECTION INTRAMUSCULAR ONCE
Status: COMPLETED | OUTPATIENT
Start: 2018-11-30 | End: 2018-11-30

## 2018-11-30 RX ORDER — 0.9 % SODIUM CHLORIDE 0.9 %
250 INTRAVENOUS SOLUTION INTRAVENOUS ONCE
Status: DISCONTINUED | OUTPATIENT
Start: 2018-11-30 | End: 2018-11-30 | Stop reason: HOSPADM

## 2018-11-30 RX ORDER — SODIUM CHLORIDE 0.9 % (FLUSH) 0.9 %
10 SYRINGE (ML) INJECTION EVERY 12 HOURS SCHEDULED
Status: DISCONTINUED | OUTPATIENT
Start: 2018-11-30 | End: 2018-12-01 | Stop reason: HOSPADM

## 2018-11-30 RX ORDER — ACETAMINOPHEN 325 MG/1
650 TABLET ORAL EVERY 4 HOURS PRN
Status: DISCONTINUED | OUTPATIENT
Start: 2018-11-30 | End: 2018-12-01 | Stop reason: HOSPADM

## 2018-11-30 RX ORDER — PANTOPRAZOLE SODIUM 40 MG/1
40 TABLET, DELAYED RELEASE ORAL
Status: DISCONTINUED | OUTPATIENT
Start: 2018-12-01 | End: 2018-12-01 | Stop reason: HOSPADM

## 2018-11-30 RX ORDER — SODIUM CHLORIDE 0.9 % (FLUSH) 0.9 %
10 SYRINGE (ML) INJECTION PRN
Status: DISCONTINUED | OUTPATIENT
Start: 2018-11-30 | End: 2018-12-01 | Stop reason: HOSPADM

## 2018-11-30 RX ORDER — CIPROFLOXACIN 2 MG/ML
400 INJECTION, SOLUTION INTRAVENOUS ONCE
Status: COMPLETED | OUTPATIENT
Start: 2018-11-30 | End: 2018-11-30

## 2018-11-30 RX ORDER — TRAMADOL HYDROCHLORIDE 50 MG/1
50 TABLET ORAL EVERY 6 HOURS PRN
Status: DISCONTINUED | OUTPATIENT
Start: 2018-11-30 | End: 2018-12-01 | Stop reason: HOSPADM

## 2018-11-30 RX ORDER — PANTOPRAZOLE SODIUM 40 MG/10ML
40 INJECTION, POWDER, LYOPHILIZED, FOR SOLUTION INTRAVENOUS 2 TIMES DAILY
Status: DISCONTINUED | OUTPATIENT
Start: 2018-11-30 | End: 2018-11-30

## 2018-11-30 RX ORDER — METHYLPREDNISOLONE SODIUM SUCCINATE 125 MG/2ML
60 INJECTION, POWDER, LYOPHILIZED, FOR SOLUTION INTRAMUSCULAR; INTRAVENOUS ONCE
Status: COMPLETED | OUTPATIENT
Start: 2018-11-30 | End: 2018-11-30

## 2018-11-30 RX ORDER — CYCLOBENZAPRINE HCL 10 MG
5 TABLET ORAL ONCE
Status: COMPLETED | OUTPATIENT
Start: 2018-11-30 | End: 2018-11-30

## 2018-11-30 RX ORDER — 0.9 % SODIUM CHLORIDE 0.9 %
250 INTRAVENOUS SOLUTION INTRAVENOUS ONCE
Status: COMPLETED | OUTPATIENT
Start: 2018-11-30 | End: 2018-11-30

## 2018-11-30 RX ORDER — ONDANSETRON 2 MG/ML
4 INJECTION INTRAMUSCULAR; INTRAVENOUS EVERY 6 HOURS PRN
Status: DISCONTINUED | OUTPATIENT
Start: 2018-11-30 | End: 2018-12-01 | Stop reason: HOSPADM

## 2018-11-30 RX ORDER — METRONIDAZOLE 500 MG/1
500 TABLET ORAL EVERY 8 HOURS SCHEDULED
Status: DISCONTINUED | OUTPATIENT
Start: 2018-11-30 | End: 2018-11-30

## 2018-11-30 RX ORDER — METHYLPREDNISOLONE SODIUM SUCCINATE 40 MG/ML
40 INJECTION, POWDER, LYOPHILIZED, FOR SOLUTION INTRAMUSCULAR; INTRAVENOUS EVERY 24 HOURS
Status: DISCONTINUED | OUTPATIENT
Start: 2018-12-01 | End: 2018-12-01 | Stop reason: HOSPADM

## 2018-11-30 RX ADMIN — LORAZEPAM 0.5 MG: 2 INJECTION INTRAMUSCULAR; INTRAVENOUS at 01:25

## 2018-11-30 RX ADMIN — PANTOPRAZOLE SODIUM 40 MG: 40 INJECTION, POWDER, FOR SOLUTION INTRAVENOUS at 06:21

## 2018-11-30 RX ADMIN — METHYLPREDNISOLONE SODIUM SUCCINATE 60 MG: 125 INJECTION, POWDER, FOR SOLUTION INTRAMUSCULAR; INTRAVENOUS at 02:08

## 2018-11-30 RX ADMIN — SODIUM CHLORIDE 250 ML: 9 INJECTION, SOLUTION INTRAVENOUS at 05:40

## 2018-11-30 RX ADMIN — METRONIDAZOLE 500 MG: 500 INJECTION, SOLUTION INTRAVENOUS at 02:16

## 2018-11-30 RX ADMIN — SODIUM CHLORIDE 250 MG: 9 INJECTION, SOLUTION INTRAVENOUS at 17:43

## 2018-11-30 RX ADMIN — PIPERACILLIN SODIUM,TAZOBACTAM SODIUM 3.38 G: 3; .375 INJECTION, POWDER, FOR SOLUTION INTRAVENOUS at 05:00

## 2018-11-30 RX ADMIN — CIPROFLOXACIN 400 MG: 2 INJECTION, SOLUTION INTRAVENOUS at 02:23

## 2018-11-30 RX ADMIN — Medication 10 ML: at 19:53

## 2018-11-30 RX ADMIN — ACETAMINOPHEN 650 MG: 325 TABLET ORAL at 03:18

## 2018-11-30 RX ADMIN — IOPAMIDOL 80 ML: 755 INJECTION, SOLUTION INTRAVENOUS at 10:44

## 2018-11-30 RX ADMIN — CYCLOBENZAPRINE HYDROCHLORIDE 5 MG: 10 TABLET, FILM COATED ORAL at 05:50

## 2018-11-30 RX ADMIN — TRAMADOL HYDROCHLORIDE 50 MG: 50 TABLET, FILM COATED ORAL at 21:15

## 2018-11-30 RX ADMIN — ONDANSETRON 4 MG: 2 INJECTION INTRAMUSCULAR; INTRAVENOUS at 06:16

## 2018-11-30 ASSESSMENT — PAIN DESCRIPTION - LOCATION
LOCATION: BACK;HEAD
LOCATION: HEAD

## 2018-11-30 ASSESSMENT — PAIN DESCRIPTION - DESCRIPTORS
DESCRIPTORS: ACHING;HEADACHE
DESCRIPTORS: ACHING
DESCRIPTORS: ACHING;HEADACHE
DESCRIPTORS: DULL;PRESSURE;THROBBING

## 2018-11-30 ASSESSMENT — PAIN DESCRIPTION - FREQUENCY
FREQUENCY: CONTINUOUS

## 2018-11-30 ASSESSMENT — PAIN DESCRIPTION - ONSET
ONSET: ON-GOING

## 2018-11-30 ASSESSMENT — PAIN SCALES - GENERAL
PAINLEVEL_OUTOF10: 6
PAINLEVEL_OUTOF10: 0
PAINLEVEL_OUTOF10: 0
PAINLEVEL_OUTOF10: 8
PAINLEVEL_OUTOF10: 6
PAINLEVEL_OUTOF10: 0
PAINLEVEL_OUTOF10: 0
PAINLEVEL_OUTOF10: 6

## 2018-11-30 ASSESSMENT — PAIN DESCRIPTION - PAIN TYPE
TYPE: ACUTE PAIN

## 2018-11-30 ASSESSMENT — ENCOUNTER SYMPTOMS
DIARRHEA: 0
WHEEZING: 0
ABDOMINAL PAIN: 0
COUGH: 0
SHORTNESS OF BREATH: 1
SORE THROAT: 0
RHINORRHEA: 0
EYE DISCHARGE: 0
VOMITING: 0
BACK PAIN: 0
EYE PAIN: 0
NAUSEA: 0

## 2018-11-30 ASSESSMENT — PAIN DESCRIPTION - PROGRESSION
CLINICAL_PROGRESSION: GRADUALLY WORSENING
CLINICAL_PROGRESSION: NOT CHANGED
CLINICAL_PROGRESSION: NOT CHANGED

## 2018-11-30 NOTE — ED PROVIDER NOTES
[address] [facility]  Pt Name: Jeanette Turner  MRN: [id number]  [place] 1985  Date of evaluation: 11/29/2018  Provider: SHIRA Valdovinos CNP    CHIEF COMPLAINT       Chief Complaint   Patient presents with    Rectal Bleeding    Other     low hemoglobin        Nurses Notes reviewed and I agree except as noted in the HPI. HISTORY OF PRESENT ILLNESS    Jeanette Turner is a 35 y.o. female whopresents to the emergency department from home for low hemoglobin. The patient had outpatient labs performed today and had a hemoglobin of 6.1. The patient reports history of ulcerative colitis. The patient reports rectal bleeding for a couple of months. She reports there is a lot of blood in the toilet bowel. The patient reports her does not absorb iron. The patient reports the blood work was because she has an appointment with her hematologist at [facility] soon. The patient reports she spoke with GI in [facility] a couple of weeks ago about her rectal bleeding and was given an appointment in March. The patient reports a few weeks ago her PCP gave her a Holter monitor for a day that was normal. The patient reports weakness, lightheadedness, and shortness of breath with exertion. The patient reports sigmoidoscope about 1 year ago. The patient denies current abdominal pain. The patient has no further symptoms or complaints at this time. Triage notes and Nursing notes were reviewed by myself. Any discrepancies are addressed above. REVIEW OF SYSTEMS     Review of Systems   Constitutional: Negative for appetite change, chills, fatigue and fever. HENT: Negative for congestion, ear pain, rhinorrhea and sore throat. Eyes: Negative for pain, discharge and visual disturbance. Respiratory: Positive for shortness of breath. Negative for cough and wheezing. Cardiovascular: Negative for chest pain, palpitations and leg swelling.         Low hemoglobin   Gastrointestinal: Negative for abdominal pain, of motion. Neck supple. No JVD present. Pulmonary/Chest: Effort normal. No stridor. No respiratory distress. Abdominal: Soft. Bowel sounds are normal. She exhibits no distension. There is tenderness (diffuse). Genitourinary: Rectal exam shows external hemorrhoid (non thrombosed ). Genitourinary Comments: No beatris rectal bleeding   Musculoskeletal: Normal range of motion. She exhibits no edema. Neurological: She is alert and oriented to person, place, and time. She exhibits normal muscle tone. GCS eye subscore is 4. GCS verbal subscore is 5. GCS motor subscore is 6. Skin: Skin is warm and dry. She is not diaphoretic. No erythema. Psychiatric: She has a normal mood and affect. Her behavior is normal.   Nursing note and vitals reviewed. DIFFERENTIAL DIAGNOSIS:   Including but not limited to Anemia, GI bleed, ulcerative colitis    DIAGNOSTIC RESULTS     EKG: AllEKG's are interpreted by the Emergency Department Physician who either signs or Co-signs this chart in the absence of a cardiologist.    Edison Olguin. Rate: 79 bpm  SD interval: 150 ms  QRS duration: 90 ms  QTc: 426 ms  P-R-T axes: 73, 50, 41  Normal sinus rhythm. No STEMI  Compared to old EKG on 11-      RADIOLOGY: non-plain film images(s) such as CT, Ultrasound and MRI are read by the radiologist.  Plain radiographic images are visualized and preliminarily interpreted by the emergencyphysician unless otherwise stated below. CT ABDOMEN PELVIS W IV CONTRAST Additional Contrast? None   Final Result   Mild thickening and inflammation of the distal colon consistent with the history of ulcerative colitis. **This report has been created using voice recognition software. It may contain minor errors which are inherent in voice recognition technology. **      Final report electronically signed by Dr. Lashae Kaur on 11/30/2018 2:26 PM      XR ABDOMEN (KUB) (SINGLE AP VIEW)   Final Result      Non obstructive bowel gas pattern. All other components within normal limits   GLOMERULAR FILTRATION RATE, ESTIMATED - Abnormal; Notable for the following:     Est, Glom Filt Rate 64 (*)     All other components within normal limits   CULTURE STOOL    Narrative:     Source: feces       Site:           Current Antibiotics: Ciprofloxacin, Metronidazole,   Piperacillin/Tazobactam   FECAL LEUKOCYTES    Narrative:     Source: feces       Site:           Current Antibiotics: Ciprofloxacin, Metronidazole,   Piperacillin/Tazobactam   TROPONIN   BLOOD OCCULT STOOL SCREEN #1   LACTIC ACID, PLASMA   LIPASE   BRAIN NATRIURETIC PEPTIDE   ANION GAP   URINE DRUG SCREEN   TSH WITH REFLEX   ANION GAP   CALPROTECTIN STOOL   TYPE AND SCREEN   PREPARE RBC (CROSSMATCH)    Narrative:     [id number]     transfused  [id number]     transfused   PREPARE RBC (CROSSMATCH)    Narrative:     [id number]     selected         EMERGENCYDEPARTMENT COURSE AND MEDICAL DECISION MAKING:   Vitals:    Vitals:    11/30/18 2115 12/01/18 0320 12/01/18 0750 12/01/18 1446   BP: (!) 111/59 (!) 100/59 100/61 (!) 90/51   Pulse: 71 63 79 83   Resp: 16 16 16 16   Temp: 97.9 °F (36.6 °C) 98 °F (36.7 °C) 98.1 °F (36.7 °C) 97.6 °F (36.4 °C)   TempSrc: Oral Oral Oral Oral   SpO2: 100% 99% 97% 98%   Weight:       Height:             Pertinent Labs & Imaging studies reviewed. (See chart for details)    ED Course as of Dec 01 2303   Fri Nov 30, 2018   0130 Case discussed at length with DR. Cherri Trujillo, my attending. Will admit for further work up and transfusion. Gary Grider accepted for admission. Requested KUB and solumedrol 60 mg IV  [KJ]      ED Course User Index  [KJ] Joan Sarmiento, APRN - CNP       The patient was seen and evaluated in atimely manner for hemoglobin of 6.1. Labs are for an appointment with hematologist soon. The patient reports history of ulcerative colitis. The patient reports rectal bleeding. The patient's vital signs were stable.  During the physical exam I noted

## 2018-11-30 NOTE — PROGRESS NOTES
Pharmacy Medication History Note      List of current medications patient is taking is complete. Source of information: Patient    Changes made to medication list:  Medications removed (include reason, ex. therapy complete or physician discontinued):  · none      Medications added/doses adjusted:  · Updated mesalamine strength/dose to 1.2 g tab, 4 tabs daily     Other notes (ex. Recent course of antibiotics, Coumadin dosing):  · Pt filled Ketorolac 10 mg on 11/18/18 for a 5 day supply  · Denies use of other OTC or herbal medications.       Allergies reviewed      Electronically signed by ROSE MARY [facility] on 11/30/2018 at 12:26 PM

## 2018-11-30 NOTE — ED NOTES
Blood transfusion started at this time. Pt baseline assessment performed. IV site intact, lung sounds clear, pt denies any itching, hives, or chills. Will continue supervision of pt for 15 minutes per policy.       [facility]  11/30/18 1348

## 2018-11-30 NOTE — H&P
History & Physical    Patient:  Rosanna Carter  YOB: 1985  Date of Service: 11/30/2018  MRN: [id number]   Acct:  [de-identified]   Primary Care Physician: Camryn Sosa MD    Chief Complaint:Abdominal pain, Diarrhea, Rectal bleeding    History of Present Illness:   History obtained from chart review and the patient. The patient is a 35 y.o. female with Ulcerative Colitis who presents to the emergency department from home for low hemoglobin which was discovered after lab draw yesterday. The patient had a hemoglobin of 6.1. The patient reports rectal bleeding for a couple of months now with periodic abdominal pain during bouts of diarrhea. The patient reports she spoke with GI in [facility] a couple of weeks ago about her rectal bleeding and was given an appointment in March. The patient reports associated fatigue, dizziness and shortness of breath. She denies falls, chest pain, fever, chills, nausea, vomiting or dysuria. The patient reports sigmoidoscope about 1 year ago. Repeat H/H was 6.1/20.8. One unit of PRBCs was given in the ED. Patient denies smoking, alcohol or illicit drug use. Patient is being admitted for further care. Past Medical History:        Diagnosis Date    Histoplasmosis     Ulcerative colitis ([facility] [address]        Past Surgical History:        Procedure Laterality Date    LUNG BIOPSY         Home Medications:   No current facility-administered medications on file prior to encounter. Current Outpatient Prescriptions on File Prior to Encounter   Medication Sig Dispense Refill    Mesalamine (LIALDA PO) Take  by mouth daily. Allergies:  Nsaids    Social History:    reports that she has never smoked. She has never used smokeless tobacco. She reports that she does not drink alcohol or use drugs. Family History:   History reviewed. No pertinent family history.     Review of systems:  Constitutional: no fever, no night sweats, + fatigue, + dizziness  Head: no headache,

## 2018-12-01 VITALS
OXYGEN SATURATION: 98 % | HEIGHT: 65 IN | BODY MASS INDEX: 23.25 KG/M2 | HEART RATE: 83 BPM | SYSTOLIC BLOOD PRESSURE: 90 MMHG | RESPIRATION RATE: 16 BRPM | DIASTOLIC BLOOD PRESSURE: 51 MMHG | TEMPERATURE: 97.6 F | WEIGHT: 139.55 LBS

## 2018-12-01 LAB
ANION GAP SERPL CALCULATED.3IONS-SCNC: 12 MEQ/L (ref 8–16)
BASOPHILS # BLD: 0.2 %
BASOPHILS ABSOLUTE: 0 THOU/MM3 (ref 0–0.1)
BUN BLDV-MCNC: 11 MG/DL (ref 7–22)
CALCIUM SERPL-MCNC: 8.8 MG/DL (ref 8.5–10.5)
CHLORIDE BLD-SCNC: 105 MEQ/L (ref 98–111)
CO2: 22 MEQ/L (ref 23–33)
CREAT SERPL-MCNC: 1 MG/DL (ref 0.4–1.2)
EOSINOPHIL # BLD: 0.2 %
EOSINOPHILS ABSOLUTE: 0 THOU/MM3 (ref 0–0.4)
ERYTHROCYTE [DISTWIDTH] IN BLOOD BY AUTOMATED COUNT: 17.3 % (ref 11.5–14.5)
ERYTHROCYTE [DISTWIDTH] IN BLOOD BY AUTOMATED COUNT: 50.5 FL (ref 35–45)
GFR SERPL CREATININE-BSD FRML MDRD: 64 ML/MIN/1.73M2
GLUCOSE BLD-MCNC: 153 MG/DL (ref 70–108)
HCT VFR BLD CALC: 28.1 % (ref 37–47)
HCT VFR BLD CALC: 28.6 % (ref 37–47)
HEMOGLOBIN: 8.6 GM/DL (ref 12–16)
HEMOGLOBIN: 8.7 GM/DL (ref 12–16)
IMMATURE GRANS (ABS): 0.13 THOU/MM3 (ref 0–0.07)
IMMATURE GRANULOCYTES: 2.2 %
LYMPHOCYTES # BLD: 17.5 %
LYMPHOCYTES ABSOLUTE: 1 THOU/MM3 (ref 1–4.8)
MCH RBC QN AUTO: 24.6 PG (ref 26–33)
MCHC RBC AUTO-ENTMCNC: 30.4 GM/DL (ref 32.2–35.5)
MCV RBC AUTO: 80.8 FL (ref 81–99)
MONOCYTES # BLD: 4.1 %
MONOCYTES ABSOLUTE: 0.2 THOU/MM3 (ref 0.4–1.3)
NUCLEATED RED BLOOD CELLS: 0 /100 WBC
PLATELET # BLD: 509 THOU/MM3 (ref 130–400)
PMV BLD AUTO: 9.5 FL (ref 9.4–12.4)
POTASSIUM REFLEX MAGNESIUM: 4.3 MEQ/L (ref 3.5–5.2)
RBC # BLD: 3.54 MILL/MM3 (ref 4.2–5.4)
SEG NEUTROPHILS: 75.8 %
SEGMENTED NEUTROPHILS ABSOLUTE COUNT: 4.4 THOU/MM3 (ref 1.8–7.7)
SODIUM BLD-SCNC: 139 MEQ/L (ref 135–145)
TSH SERPL DL<=0.05 MIU/L-ACNC: 0.41 UIU/ML (ref 0.4–4.2)
WBC # BLD: 5.8 THOU/MM3 (ref 4.8–10.8)

## 2018-12-01 PROCEDURE — 84443 ASSAY THYROID STIM HORMONE: CPT

## 2018-12-01 PROCEDURE — 6360000002 HC RX W HCPCS: Performed by: FAMILY MEDICINE

## 2018-12-01 PROCEDURE — 99239 HOSP IP/OBS DSCHRG MGMT >30: CPT | Performed by: FAMILY MEDICINE

## 2018-12-01 PROCEDURE — 6370000000 HC RX 637 (ALT 250 FOR IP): Performed by: INTERNAL MEDICINE

## 2018-12-01 PROCEDURE — 85025 COMPLETE CBC W/AUTO DIFF WBC: CPT

## 2018-12-01 PROCEDURE — 6370000000 HC RX 637 (ALT 250 FOR IP): Performed by: FAMILY MEDICINE

## 2018-12-01 PROCEDURE — 2580000003 HC RX 258: Performed by: FAMILY MEDICINE

## 2018-12-01 PROCEDURE — 2709999900 HC NON-CHARGEABLE SUPPLY

## 2018-12-01 PROCEDURE — 80048 BASIC METABOLIC PNL TOTAL CA: CPT

## 2018-12-01 PROCEDURE — 85014 HEMATOCRIT: CPT

## 2018-12-01 PROCEDURE — 85018 HEMOGLOBIN: CPT

## 2018-12-01 PROCEDURE — 36415 COLL VENOUS BLD VENIPUNCTURE: CPT

## 2018-12-01 RX ORDER — PANTOPRAZOLE SODIUM 40 MG/1
40 TABLET, DELAYED RELEASE ORAL
Qty: 30 TABLET | Refills: 0 | Status: SHIPPED | OUTPATIENT
Start: 2018-12-02 | End: 2019-06-20

## 2018-12-01 RX ORDER — PREDNISONE 10 MG/1
TABLET ORAL
Qty: 24 TABLET | Refills: 0 | Status: SHIPPED | OUTPATIENT
Start: 2018-12-01 | End: 2018-12-21

## 2018-12-01 RX ADMIN — ACETAMINOPHEN 650 MG: 325 TABLET ORAL at 00:16

## 2018-12-01 RX ADMIN — METHYLPREDNISOLONE SODIUM SUCCINATE 40 MG: 40 INJECTION, POWDER, FOR SOLUTION INTRAMUSCULAR; INTRAVENOUS at 03:20

## 2018-12-01 RX ADMIN — Medication 10 ML: at 10:16

## 2018-12-01 RX ADMIN — TRAMADOL HYDROCHLORIDE 50 MG: 50 TABLET, FILM COATED ORAL at 10:16

## 2018-12-01 RX ADMIN — TRAMADOL HYDROCHLORIDE 50 MG: 50 TABLET, FILM COATED ORAL at 03:21

## 2018-12-01 RX ADMIN — PANTOPRAZOLE SODIUM 40 MG: 40 TABLET, DELAYED RELEASE ORAL at 06:15

## 2018-12-01 RX ADMIN — ACETAMINOPHEN 650 MG: 325 TABLET ORAL at 07:50

## 2018-12-01 ASSESSMENT — PAIN SCALES - GENERAL
PAINLEVEL_OUTOF10: 6
PAINLEVEL_OUTOF10: 6
PAINLEVEL_OUTOF10: 7
PAINLEVEL_OUTOF10: 6
PAINLEVEL_OUTOF10: 6

## 2018-12-01 ASSESSMENT — PAIN DESCRIPTION - PAIN TYPE: TYPE: ACUTE PAIN

## 2018-12-01 ASSESSMENT — PAIN DESCRIPTION - FREQUENCY: FREQUENCY: CONTINUOUS

## 2018-12-01 ASSESSMENT — PAIN DESCRIPTION - LOCATION: LOCATION: HEAD

## 2018-12-01 ASSESSMENT — PAIN DESCRIPTION - DESCRIPTORS: DESCRIPTORS: ACHING;DISCOMFORT;DULL

## 2018-12-01 ASSESSMENT — PAIN DESCRIPTION - ONSET: ONSET: ON-GOING

## 2018-12-01 NOTE — DISCHARGE INSTR - DIET

## 2018-12-01 NOTE — DISCHARGE SUMMARY
Result Value Ref Range    Lipase 43.3 5.6 - 51.3 U/L   Comprehensive metabolic panel    Collection Time: 11/30/18  3:06 AM   Result Value Ref Range    Glucose 128 (H) 70 - 108 mg/dL    CREATININE 1.0 0.4 - 1.2 mg/dL    BUN 10 7 - 22 mg/dL    Sodium 136 135 - 145 meq/L    Potassium 3.9 3.5 - 5.2 meq/L    Chloride 101 98 - 111 meq/L    CO2 25 23 - 33 meq/L    Calcium 8.6 8.5 - 10.5 mg/dL    AST 18 5 - 40 U/L    Alkaline Phosphatase 60 38 - 126 U/L    Total Protein 6.4 6.1 - 8.0 g/dL    Alb 3.7 3.5 - 5.1 g/dL    Total Bilirubin 0.2 (L) 0.3 - 1.2 mg/dL    ALT 10 (L) 11 - 66 U/L   Brain Natriuretic Peptide    Collection Time: 11/30/18  3:06 AM   Result Value Ref Range    Pro-BNP 43.3 0.0 - 450.0 pg/mL   Anion Gap    Collection Time: 11/30/18  3:06 AM   Result Value Ref Range    Anion Gap 10.0 8.0 - 16.0 meq/L   Glomerular Filtration Rate, Estimated    Collection Time: 11/30/18  3:06 AM   Result Value Ref Range    Est, Glom Filt Rate 64 (A) ml/min/1.73m2   Hemoglobin and hematocrit, blood    Collection Time: 11/30/18 10:04 AM   Result Value Ref Range    Hemoglobin 8.6 (L) 12.0 - 16.0 gm/dl    Hematocrit 28.4 (L) 37.0 - 47.0 %   Hemoglobin and Hematocrit, Blood    Collection Time: 11/30/18  5:40 PM   Result Value Ref Range    Hemoglobin 8.9 (L) 12.0 - 16.0 gm/dl    Hematocrit 28.8 (L) 37.0 - 47.0 %   Culture Stool    Collection Time: 11/30/18  7:00 PM   Result Value Ref Range    Culture, Stool No enteric pathogens isolated-preliminary. Fecal leukocytes    Collection Time: 11/30/18  7:00 PM   Result Value Ref Range    Fecal Leukocytes Many WBC's observed.     Urine Drug Screen    Collection Time: 11/30/18  7:00 PM   Result Value Ref Range    AMPHETAMINE+METHAMPHETAMINE URINE SCREEN Negative NEGATIVE    Barbiturate Quant, Ur Negative NEGATIVE    Benzodiazepine Quant, Ur Negative NEGATIVE    Cannabinoid Quant, Ur Negative NEGATIVE    Cocaine Metab Quant, Ur Negative NEGATIVE    Opiates, Urine Negative NEGATIVE

## 2018-12-01 NOTE — PROGRESS NOTES
Discharge instructions given to patient and spouse, all questions answered. Dishcarged with all belongings.

## 2018-12-02 LAB
CULTURE, STOOL: NORMAL
TRANSFERRIN: 306 MG/DL (ref 200–400)

## 2018-12-04 LAB — CALPROTECTIN: 654 UG/G

## 2018-12-06 ENCOUNTER — OFFICE VISIT (OUTPATIENT)
Dept: ONCOLOGY | Age: 33
End: 2018-12-06
Payer: COMMERCIAL

## 2018-12-06 ENCOUNTER — HOSPITAL ENCOUNTER (OUTPATIENT)
Dept: INFUSION THERAPY | Age: 33
Discharge: HOME | End: 2018-12-06
Payer: COMMERCIAL

## 2018-12-06 VITALS
HEIGHT: 65 IN | BODY MASS INDEX: 23.29 KG/M2 | OXYGEN SATURATION: 96 % | SYSTOLIC BLOOD PRESSURE: 118 MMHG | DIASTOLIC BLOOD PRESSURE: 79 MMHG | HEART RATE: 85 BPM | RESPIRATION RATE: 16 BRPM | TEMPERATURE: 97.4 F | WEIGHT: 139.8 LBS

## 2018-12-06 DIAGNOSIS — D50.0 CHRONIC BLOOD LOSS ANEMIA: ICD-10-CM

## 2018-12-06 DIAGNOSIS — K51.011 ULCERATIVE PANCOLITIS WITH RECTAL BLEEDING (HCC): Primary | ICD-10-CM

## 2018-12-06 DIAGNOSIS — K62.5 RECTAL BLEEDING: ICD-10-CM

## 2018-12-06 DIAGNOSIS — D50.0 IRON DEFICIENCY ANEMIA DUE TO CHRONIC BLOOD LOSS: ICD-10-CM

## 2018-12-06 DIAGNOSIS — K51.811 OTHER ULCERATIVE COLITIS WITH RECTAL BLEEDING (HCC): ICD-10-CM

## 2018-12-06 PROCEDURE — 99204 OFFICE O/P NEW MOD 45 MIN: CPT | Performed by: INTERNAL MEDICINE

## 2018-12-06 PROCEDURE — 99211 OFF/OP EST MAY X REQ PHY/QHP: CPT

## 2018-12-10 ENCOUNTER — HOSPITAL ENCOUNTER (OUTPATIENT)
Age: 33
Discharge: HOME | End: 2018-12-10
Payer: COMMERCIAL

## 2018-12-10 DIAGNOSIS — K51.011 ULCERATIVE PANCOLITIS WITH RECTAL BLEEDING (HCC): ICD-10-CM

## 2018-12-10 LAB
ANISOCYTOSIS: ABNORMAL
BASOPHILS # BLD: 0.4 % (ref 0–3)
EOSINOPHILS RELATIVE PERCENT: 4.7 % (ref 0–4)
HCT VFR BLD CALC: 28.9 % (ref 37–47)
HEMOGLOBIN: 9.2 GM/DL (ref 12–16)
HYPOCHROMIA: ABNORMAL
LYMPHOCYTES # BLD: 41.9 % (ref 15–47)
MCH RBC QN AUTO: 24.9 PG (ref 27–31)
MCHC RBC AUTO-ENTMCNC: 32 GM/DL (ref 33–37)
MCV RBC AUTO: 77.7 FL (ref 81–99)
MICROCYTES: ABNORMAL
MONOCYTES: 10.3 % (ref 0–12)
PDW BLD-RTO: 18.6 % (ref 11.5–14.5)
PLATELET # BLD: 417 THOU/MM3 (ref 130–400)
PLATELET ESTIMATE: ABNORMAL
PMV BLD AUTO: 7.4 FL (ref 7.4–10.4)
RBC # BLD: 3.71 MILL/MM3 (ref 4.2–5.4)
SCAN OF BLOOD SMEAR: NORMAL
SEGS: 42.7 % (ref 43–75)
WBC # BLD: 8.2 THOU/MM3 (ref 4.8–10.8)

## 2018-12-10 PROCEDURE — 83550 IRON BINDING TEST: CPT

## 2018-12-10 PROCEDURE — 83540 ASSAY OF IRON: CPT

## 2018-12-10 PROCEDURE — 36415 COLL VENOUS BLD VENIPUNCTURE: CPT

## 2018-12-10 PROCEDURE — 82728 ASSAY OF FERRITIN: CPT

## 2018-12-10 PROCEDURE — 85025 COMPLETE CBC W/AUTO DIFF WBC: CPT

## 2018-12-11 LAB
FERRITIN: 44 NG/ML (ref 10–291)
IRON SATURATION: 7 % (ref 20–50)
IRON: 22 UG/DL (ref 50–170)
TOTAL IRON BINDING CAPACITY: 338 UG/DL (ref 171–450)

## 2018-12-21 ENCOUNTER — HOSPITAL ENCOUNTER (OUTPATIENT)
Dept: INFUSION THERAPY | Age: 33
Discharge: HOME | End: 2018-12-21
Payer: COMMERCIAL

## 2018-12-21 VITALS
WEIGHT: 143 LBS | HEART RATE: 74 BPM | SYSTOLIC BLOOD PRESSURE: 111 MMHG | HEIGHT: 65 IN | BODY MASS INDEX: 23.82 KG/M2 | DIASTOLIC BLOOD PRESSURE: 67 MMHG | TEMPERATURE: 97.8 F | RESPIRATION RATE: 18 BRPM | OXYGEN SATURATION: 95 %

## 2018-12-21 DIAGNOSIS — D50.0 CHRONIC BLOOD LOSS ANEMIA: ICD-10-CM

## 2018-12-21 DIAGNOSIS — K51.011 ULCERATIVE PANCOLITIS WITH RECTAL BLEEDING (HCC): ICD-10-CM

## 2018-12-21 DIAGNOSIS — R53.83 OTHER FATIGUE: ICD-10-CM

## 2018-12-21 DIAGNOSIS — K62.5 RECTAL BLEEDING: ICD-10-CM

## 2018-12-21 DIAGNOSIS — R06.02 SHORTNESS OF BREATH: ICD-10-CM

## 2018-12-21 DIAGNOSIS — N18.2 CKD (CHRONIC KIDNEY DISEASE), STAGE II: ICD-10-CM

## 2018-12-21 PROCEDURE — 2709999900 HC NON-CHARGEABLE SUPPLY

## 2018-12-21 PROCEDURE — 6360000002 HC RX W HCPCS: Performed by: PHYSICIAN ASSISTANT

## 2018-12-21 PROCEDURE — 2580000003 HC RX 258: Performed by: PHYSICIAN ASSISTANT

## 2018-12-21 PROCEDURE — 96365 THER/PROPH/DIAG IV INF INIT: CPT

## 2018-12-21 RX ORDER — HEPARIN SODIUM (PORCINE) LOCK FLUSH IV SOLN 100 UNIT/ML 100 UNIT/ML
500 SOLUTION INTRAVENOUS PRN
Status: CANCELLED | OUTPATIENT
Start: 2018-12-21

## 2018-12-21 RX ORDER — DIPHENHYDRAMINE HYDROCHLORIDE 50 MG/ML
50 INJECTION INTRAMUSCULAR; INTRAVENOUS ONCE
Status: CANCELLED | OUTPATIENT
Start: 2018-12-21 | End: 2018-12-21

## 2018-12-21 RX ORDER — 0.9 % SODIUM CHLORIDE 0.9 %
10 VIAL (ML) INJECTION ONCE
Status: CANCELLED | OUTPATIENT
Start: 2018-12-21 | End: 2018-12-21

## 2018-12-21 RX ORDER — METHYLPREDNISOLONE SODIUM SUCCINATE 125 MG/2ML
125 INJECTION, POWDER, LYOPHILIZED, FOR SOLUTION INTRAMUSCULAR; INTRAVENOUS ONCE
Status: CANCELLED | OUTPATIENT
Start: 2018-12-21 | End: 2018-12-21

## 2018-12-21 RX ORDER — SODIUM CHLORIDE 0.9 % (FLUSH) 0.9 %
5 SYRINGE (ML) INJECTION PRN
Status: CANCELLED | OUTPATIENT
Start: 2018-12-21

## 2018-12-21 RX ORDER — SODIUM CHLORIDE 0.9 % (FLUSH) 0.9 %
10 SYRINGE (ML) INJECTION PRN
Status: CANCELLED | OUTPATIENT
Start: 2018-12-21

## 2018-12-21 RX ORDER — SODIUM CHLORIDE 9 MG/ML
100 INJECTION, SOLUTION INTRAVENOUS CONTINUOUS
Status: CANCELLED | OUTPATIENT
Start: 2018-12-21

## 2018-12-21 RX ORDER — SODIUM CHLORIDE 9 MG/ML
INJECTION, SOLUTION INTRAVENOUS ONCE
Status: COMPLETED | OUTPATIENT
Start: 2018-12-21 | End: 2018-12-21

## 2018-12-21 RX ORDER — SODIUM CHLORIDE 9 MG/ML
INJECTION, SOLUTION INTRAVENOUS ONCE
Status: CANCELLED | OUTPATIENT
Start: 2018-12-21 | End: 2018-12-21

## 2018-12-21 RX ORDER — EPINEPHRINE 1 MG/ML
0.3 INJECTION, SOLUTION, CONCENTRATE INTRAVENOUS PRN
Status: CANCELLED | OUTPATIENT
Start: 2018-12-21

## 2018-12-21 RX ADMIN — SODIUM CHLORIDE: 9 INJECTION, SOLUTION INTRAVENOUS at 14:30

## 2018-12-21 RX ADMIN — FERUMOXYTOL 510 MG: 510 INJECTION INTRAVENOUS at 14:52

## 2018-12-21 ASSESSMENT — PAIN SCALES - GENERAL: PAINLEVEL_OUTOF10: 0

## 2018-12-21 NOTE — PROGRESS NOTES
Patient assessed for the following post iron infusion    Dizziness   No  Lightheadedness  No      Acute nausea/vomiting No  Headache   No  Chest pain/pressure  No  Rash/itching   No  Shortness of breath  No    Patient kept for 20 minutes observation post infusion feraheme. Patient tolerated  treatment feraheme without any complications. Last vital signs:   /67   Pulse 74   Temp 97.8 °F (36.6 °C) (Oral)   Resp 18   Ht 5' 5\" (1.651 m)   Wt 143 lb (64.9 kg)   LMP 10/24/2018 (Within Days)   SpO2 95%   BMI 23.80 kg/m²     Patient instructed if experience any of the above symptoms following today's infusion, she is to notify MD immediately or go to the emergency department. Discharge instructions given to patient. Verbalizes understanding. Ambulated off unit per self with belongings.

## 2018-12-21 NOTE — PLAN OF CARE
Problem: Intellectual/Education/Knowledge Deficit  Intervention: Verbal/written education provided  Discuss feraheme dosing, treatment plan, and potential side effects to monitor for during and post infusion. Goal: Teaching initiated upon admission  Outcome: Met This Shift  Patient verbalizes understanding to verbal information given on feraheme,action and possible side effects. Aware to call MD if develop complications. Problem: Discharge Planning:  Intervention: Assess readiness for discharge  Discuss understanding of discharge instructions,follow-up appointments, and when to call the physician. Goal: Discharged to appropriate level of care  Discharged to appropriate level of care  Outcome: Met This Shift  Verbalized understanding of discharge instructions, follow-up appointments, and when to call the physician. Comments: Care plan reviewed with patient. Patient verbalize understanding of the plan of care and contribute to goal setting.

## 2018-12-28 ENCOUNTER — HOSPITAL ENCOUNTER (OUTPATIENT)
Dept: INFUSION THERAPY | Age: 33
Discharge: HOME | End: 2018-12-28
Payer: COMMERCIAL

## 2018-12-28 VITALS
OXYGEN SATURATION: 100 % | HEART RATE: 91 BPM | DIASTOLIC BLOOD PRESSURE: 67 MMHG | BODY MASS INDEX: 23.8 KG/M2 | HEIGHT: 65 IN | TEMPERATURE: 98.5 F | RESPIRATION RATE: 18 BRPM | SYSTOLIC BLOOD PRESSURE: 104 MMHG

## 2018-12-28 DIAGNOSIS — N18.2 CKD (CHRONIC KIDNEY DISEASE), STAGE II: ICD-10-CM

## 2018-12-28 DIAGNOSIS — R06.02 SHORTNESS OF BREATH: ICD-10-CM

## 2018-12-28 DIAGNOSIS — R53.83 OTHER FATIGUE: ICD-10-CM

## 2018-12-28 DIAGNOSIS — D50.0 CHRONIC BLOOD LOSS ANEMIA: ICD-10-CM

## 2018-12-28 DIAGNOSIS — K51.011 ULCERATIVE PANCOLITIS WITH RECTAL BLEEDING (HCC): ICD-10-CM

## 2018-12-28 DIAGNOSIS — K62.5 RECTAL BLEEDING: ICD-10-CM

## 2018-12-28 PROCEDURE — 6360000002 HC RX W HCPCS: Performed by: PHYSICIAN ASSISTANT

## 2018-12-28 PROCEDURE — 2709999900 HC NON-CHARGEABLE SUPPLY

## 2018-12-28 PROCEDURE — 96365 THER/PROPH/DIAG IV INF INIT: CPT

## 2018-12-28 PROCEDURE — 2580000003 HC RX 258: Performed by: PHYSICIAN ASSISTANT

## 2018-12-28 RX ORDER — DIPHENHYDRAMINE HYDROCHLORIDE 50 MG/ML
50 INJECTION INTRAMUSCULAR; INTRAVENOUS ONCE
Status: CANCELLED | OUTPATIENT
Start: 2018-12-28 | End: 2018-12-28

## 2018-12-28 RX ORDER — SODIUM CHLORIDE 9 MG/ML
INJECTION, SOLUTION INTRAVENOUS ONCE
Status: COMPLETED | OUTPATIENT
Start: 2018-12-28 | End: 2018-12-28

## 2018-12-28 RX ORDER — SODIUM CHLORIDE 9 MG/ML
100 INJECTION, SOLUTION INTRAVENOUS CONTINUOUS
Status: CANCELLED | OUTPATIENT
Start: 2018-12-28

## 2018-12-28 RX ORDER — SODIUM CHLORIDE 0.9 % (FLUSH) 0.9 %
10 SYRINGE (ML) INJECTION PRN
Status: CANCELLED | OUTPATIENT
Start: 2018-12-28

## 2018-12-28 RX ORDER — SODIUM CHLORIDE 0.9 % (FLUSH) 0.9 %
5 SYRINGE (ML) INJECTION PRN
Status: CANCELLED | OUTPATIENT
Start: 2018-12-28

## 2018-12-28 RX ORDER — HEPARIN SODIUM (PORCINE) LOCK FLUSH IV SOLN 100 UNIT/ML 100 UNIT/ML
500 SOLUTION INTRAVENOUS PRN
Status: CANCELLED | OUTPATIENT
Start: 2018-12-28

## 2018-12-28 RX ORDER — 0.9 % SODIUM CHLORIDE 0.9 %
10 VIAL (ML) INJECTION ONCE
Status: CANCELLED | OUTPATIENT
Start: 2018-12-28 | End: 2018-12-28

## 2018-12-28 RX ORDER — METHYLPREDNISOLONE SODIUM SUCCINATE 125 MG/2ML
125 INJECTION, POWDER, LYOPHILIZED, FOR SOLUTION INTRAMUSCULAR; INTRAVENOUS ONCE
Status: CANCELLED | OUTPATIENT
Start: 2018-12-28 | End: 2018-12-28

## 2018-12-28 RX ORDER — SODIUM CHLORIDE 9 MG/ML
INJECTION, SOLUTION INTRAVENOUS ONCE
Status: CANCELLED | OUTPATIENT
Start: 2018-12-28 | End: 2018-12-28

## 2018-12-28 RX ADMIN — SODIUM CHLORIDE: 9 INJECTION, SOLUTION INTRAVENOUS at 08:38

## 2018-12-28 RX ADMIN — FERUMOXYTOL 510 MG: 510 INJECTION INTRAVENOUS at 08:40

## 2018-12-28 NOTE — PROGRESS NOTES
Patient assessed for the following post feraheme    Dizziness   No  Lightheadedness  No      Acute nausea/vomiting No  Headache   No  Chest pain/pressure  No  Rash/itching   No  Shortness of breath  No    Patient kept for 20 minutes observation post infusion   Patient tolerated  feraheme without any complications. Last vital signs:   /67   Pulse 91   Temp 98.5 °F (36.9 °C) (Oral)   Resp 18   Ht 5' 5\" (1.651 m)   LMP 10/24/2018 (Within Days)   SpO2 100%   BMI 23.80 kg/m²         Patient instructed if experience any of the above symptoms following today's infusion,he/she is to notify MD immediately or go to the emergency department. Discharge instructions given to patient. Verbalizes understanding. Ambulated off unit per self with belongings.

## 2018-12-28 NOTE — PLAN OF CARE
Problem: Intellectual/Education/Knowledge Deficit  Intervention: Verbal/written education provided  Discuss feraheme,action and possible side effects. Aware to call MD if develop complications. Goal: Teaching initiated upon admission  Outcome: Met This Shift  Patient verbalizes understanding to verbal information given on feraheme,action and possible side effects. Aware to call MD if develop complications. Problem: Discharge Planning  Intervention: Discharge to appropriate level of care  Discuss discharge instructions, follow ups and when to call doctor. Goal: Knowledge of discharge instructions  Knowledge of discharge instructions    Outcome: Met This Shift  Verbalized understanding of discharge instructions, follow ups and when to call doctor    Comments: Care plan reviewed with patient. Patient  verbalized understanding of the plan of care and contribute to goal setting.

## 2019-01-06 ASSESSMENT — ENCOUNTER SYMPTOMS
BACK PAIN: 0
RECTAL PAIN: 0
EYE DISCHARGE: 0
WHEEZING: 0
ABDOMINAL DISTENTION: 0
ABDOMINAL PAIN: 1
DIARRHEA: 1
COLOR CHANGE: 0
COUGH: 0
NAUSEA: 0
TROUBLE SWALLOWING: 0
VOMITING: 0
BLOOD IN STOOL: 1
SHORTNESS OF BREATH: 1
SORE THROAT: 0
FACIAL SWELLING: 0
CHEST TIGHTNESS: 1
CONSTIPATION: 1

## 2019-01-09 ENCOUNTER — OFFICE VISIT (OUTPATIENT)
Dept: ONCOLOGY | Age: 34
End: 2019-01-09
Payer: COMMERCIAL

## 2019-01-09 ENCOUNTER — HOSPITAL ENCOUNTER (OUTPATIENT)
Dept: INFUSION THERAPY | Age: 34
Discharge: HOME | End: 2019-01-09
Payer: COMMERCIAL

## 2019-01-09 VITALS
WEIGHT: 150.8 LBS | RESPIRATION RATE: 16 BRPM | TEMPERATURE: 98.3 F | OXYGEN SATURATION: 94 % | HEIGHT: 65 IN | DIASTOLIC BLOOD PRESSURE: 74 MMHG | BODY MASS INDEX: 25.12 KG/M2 | SYSTOLIC BLOOD PRESSURE: 127 MMHG | HEART RATE: 82 BPM

## 2019-01-09 DIAGNOSIS — D64.9 LOW HEMOGLOBIN: ICD-10-CM

## 2019-01-09 DIAGNOSIS — D50.0 CHRONIC BLOOD LOSS ANEMIA: ICD-10-CM

## 2019-01-09 DIAGNOSIS — K51.011 ULCERATIVE PANCOLITIS WITH RECTAL BLEEDING (HCC): ICD-10-CM

## 2019-01-09 DIAGNOSIS — D64.9 LOW HEMOGLOBIN: Primary | ICD-10-CM

## 2019-01-09 DIAGNOSIS — D64.9 ANEMIA, UNSPECIFIED TYPE: ICD-10-CM

## 2019-01-09 LAB
BASINOPHIL, AUTOMATED: 0 % (ref 0–3)
EOSINOPHILS RELATIVE PERCENT: 2 % (ref 0–4)
HCT VFR BLD CALC: 32.7 % (ref 37–47)
HEMOGLOBIN: 10.7 GM/DL (ref 12–16)
LYMPHOCYTES # BLD: 55 % (ref 15–47)
MCH RBC QN AUTO: 25.3 PG (ref 27–31)
MCHC RBC AUTO-ENTMCNC: 32.7 GM/DL (ref 33–37)
MCV RBC AUTO: 77 FL (ref 81–99)
MONOCYTES: 7 % (ref 0–12)
PDW BLD-RTO: 20.8 % (ref 11.5–14.5)
PLATELET # BLD: 309 THOU/MM3 (ref 130–400)
PMV BLD AUTO: 7.8 FL (ref 7.4–10.4)
PREGNANCY, SERUM: POSITIVE
RBC # BLD: 4.23 MILL/MM3 (ref 4.2–5.4)
SEG NEUTROPHILS: 35 % (ref 43–75)
WBC # BLD: 5.2 THOU/MM3 (ref 4.8–10.8)

## 2019-01-09 PROCEDURE — 36415 COLL VENOUS BLD VENIPUNCTURE: CPT

## 2019-01-09 PROCEDURE — 84703 CHORIONIC GONADOTROPIN ASSAY: CPT

## 2019-01-09 PROCEDURE — 85025 COMPLETE CBC W/AUTO DIFF WBC: CPT

## 2019-01-09 PROCEDURE — 99213 OFFICE O/P EST LOW 20 MIN: CPT | Performed by: INTERNAL MEDICINE

## 2019-01-10 ENCOUNTER — TELEPHONE (OUTPATIENT)
Dept: ONCOLOGY | Age: 34
End: 2019-01-10

## 2019-01-15 ASSESSMENT — ENCOUNTER SYMPTOMS
BACK PAIN: 0
COUGH: 0
SORE THROAT: 0
COLOR CHANGE: 0
EYE DISCHARGE: 0
WHEEZING: 0
RECTAL PAIN: 0
CHEST TIGHTNESS: 1
BLOOD IN STOOL: 1
SHORTNESS OF BREATH: 1
CONSTIPATION: 1
NAUSEA: 0
ABDOMINAL DISTENTION: 0
DIARRHEA: 1
TROUBLE SWALLOWING: 0
ABDOMINAL PAIN: 1
FACIAL SWELLING: 0
VOMITING: 0

## 2019-01-24 ENCOUNTER — HOSPITAL ENCOUNTER (OUTPATIENT)
Age: 34
Discharge: HOME | End: 2019-01-24
Payer: COMMERCIAL

## 2019-01-24 LAB
ABO: NORMAL
ANISOCYTOSIS: PRESENT
ANTIBODY SCREEN: NORMAL
BASOPHILS # BLD: 0.9 %
BASOPHILS ABSOLUTE: 0 THOU/MM3 (ref 0–0.1)
EOSINOPHIL # BLD: 2.8 %
EOSINOPHILS ABSOLUTE: 0.1 THOU/MM3 (ref 0–0.4)
ERYTHROCYTE [DISTWIDTH] IN BLOOD BY AUTOMATED COUNT: 22.5 % (ref 11.5–14.5)
ERYTHROCYTE [DISTWIDTH] IN BLOOD BY AUTOMATED COUNT: 67.1 FL (ref 35–45)
HCT VFR BLD CALC: 37 % (ref 37–47)
HEMOGLOBIN: 11.6 GM/DL (ref 12–16)
HEPATITIS B SURFACE ANTIGEN: NEGATIVE
IMMATURE GRANS (ABS): 0.01 THOU/MM3 (ref 0–0.07)
IMMATURE GRANULOCYTES: 0.2 %
LYMPHOCYTES # BLD: 24.1 %
LYMPHOCYTES ABSOLUTE: 1.1 THOU/MM3 (ref 1–4.8)
MCH RBC QN AUTO: 25.8 PG (ref 26–33)
MCHC RBC AUTO-ENTMCNC: 31.4 GM/DL (ref 32.2–35.5)
MCV RBC AUTO: 82.2 FL (ref 81–99)
MONOCYTES # BLD: 11.2 %
MONOCYTES ABSOLUTE: 0.5 THOU/MM3 (ref 0.4–1.3)
NUCLEATED RED BLOOD CELLS: 0 /100 WBC
PLATELET # BLD: 231 THOU/MM3 (ref 130–400)
PMV BLD AUTO: 10.6 FL (ref 9.4–12.4)
POIKILOCYTES: ABNORMAL
RBC # BLD: 4.5 MILL/MM3 (ref 4.2–5.4)
RH FACTOR: NORMAL
SCAN OF BLOOD SMEAR: NORMAL
SEG NEUTROPHILS: 60.8 %
SEGMENTED NEUTROPHILS ABSOLUTE COUNT: 2.7 THOU/MM3 (ref 1.8–7.7)
WBC # BLD: 4.4 THOU/MM3 (ref 4.8–10.8)

## 2019-01-24 PROCEDURE — 86762 RUBELLA ANTIBODY: CPT

## 2019-01-24 PROCEDURE — 86850 RBC ANTIBODY SCREEN: CPT

## 2019-01-24 PROCEDURE — 86787 VARICELLA-ZOSTER ANTIBODY: CPT

## 2019-01-24 PROCEDURE — 86901 BLOOD TYPING SEROLOGIC RH(D): CPT

## 2019-01-24 PROCEDURE — 87389 HIV-1 AG W/HIV-1&-2 AB AG IA: CPT

## 2019-01-24 PROCEDURE — 87340 HEPATITIS B SURFACE AG IA: CPT

## 2019-01-24 PROCEDURE — 85025 COMPLETE CBC W/AUTO DIFF WBC: CPT

## 2019-01-24 PROCEDURE — 86592 SYPHILIS TEST NON-TREP QUAL: CPT

## 2019-01-24 PROCEDURE — 36415 COLL VENOUS BLD VENIPUNCTURE: CPT

## 2019-01-24 PROCEDURE — 86900 BLOOD TYPING SEROLOGIC ABO: CPT

## 2019-01-25 LAB
HIV-2 AB: NEGATIVE
RPR: NONREACTIVE
VZV IGG SER QL IA: 2.29

## 2019-01-26 LAB — RUBV IGG SER QL: 23 IU/ML

## 2019-02-06 DIAGNOSIS — D50.0 CHRONIC BLOOD LOSS ANEMIA: Primary | ICD-10-CM

## 2019-02-07 ENCOUNTER — HOSPITAL ENCOUNTER (OUTPATIENT)
Dept: INFUSION THERAPY | Age: 34
Discharge: HOME | End: 2019-02-07
Payer: COMMERCIAL

## 2019-02-07 ENCOUNTER — OFFICE VISIT (OUTPATIENT)
Dept: ONCOLOGY | Age: 34
End: 2019-02-07
Payer: COMMERCIAL

## 2019-02-07 VITALS
DIASTOLIC BLOOD PRESSURE: 71 MMHG | OXYGEN SATURATION: 100 % | HEART RATE: 85 BPM | HEIGHT: 65 IN | RESPIRATION RATE: 16 BRPM | WEIGHT: 154.2 LBS | BODY MASS INDEX: 25.69 KG/M2 | TEMPERATURE: 98.3 F | SYSTOLIC BLOOD PRESSURE: 118 MMHG

## 2019-02-07 DIAGNOSIS — D64.9 LOW HEMOGLOBIN: ICD-10-CM

## 2019-02-07 DIAGNOSIS — D50.0 CHRONIC BLOOD LOSS ANEMIA: ICD-10-CM

## 2019-02-07 DIAGNOSIS — D50.0 CHRONIC BLOOD LOSS ANEMIA: Primary | ICD-10-CM

## 2019-02-07 DIAGNOSIS — K51.011 ULCERATIVE PANCOLITIS WITH RECTAL BLEEDING (HCC): ICD-10-CM

## 2019-02-07 LAB
BASINOPHIL, AUTOMATED: 0 % (ref 0–3)
EOSINOPHILS RELATIVE PERCENT: 2 % (ref 0–4)
HCT VFR BLD CALC: 31.4 % (ref 37–47)
HEMOGLOBIN: 10.6 GM/DL (ref 12–16)
IRON: 74 UG/DL (ref 50–170)
LYMPHOCYTES # BLD: 32 % (ref 15–47)
MCH RBC QN AUTO: 26.6 PG (ref 27–31)
MCHC RBC AUTO-ENTMCNC: 33.7 GM/DL (ref 33–37)
MCV RBC AUTO: 79 FL (ref 81–99)
MONOCYTES: 7 % (ref 0–12)
PDW BLD-RTO: 20.2 % (ref 11.5–14.5)
PLATELET # BLD: 322 THOU/MM3 (ref 130–400)
PMV BLD AUTO: 7.7 FL (ref 7.4–10.4)
RBC # BLD: 3.97 MILL/MM3 (ref 4.2–5.4)
SEG NEUTROPHILS: 59 % (ref 43–75)
WBC # BLD: 7.4 THOU/MM3 (ref 4.8–10.8)

## 2019-02-07 PROCEDURE — 99213 OFFICE O/P EST LOW 20 MIN: CPT | Performed by: INTERNAL MEDICINE

## 2019-02-07 PROCEDURE — 99211 OFF/OP EST MAY X REQ PHY/QHP: CPT

## 2019-02-07 PROCEDURE — 36415 COLL VENOUS BLD VENIPUNCTURE: CPT

## 2019-02-07 PROCEDURE — 83540 ASSAY OF IRON: CPT

## 2019-02-07 PROCEDURE — 85025 COMPLETE CBC W/AUTO DIFF WBC: CPT

## 2019-02-07 ASSESSMENT — ENCOUNTER SYMPTOMS
NAUSEA: 0
RECTAL PAIN: 0
ABDOMINAL DISTENTION: 0
FACIAL SWELLING: 0
ABDOMINAL PAIN: 1
EYE DISCHARGE: 0
SORE THROAT: 0
COLOR CHANGE: 0
VOMITING: 0
CONSTIPATION: 1
TROUBLE SWALLOWING: 0
CHEST TIGHTNESS: 1
DIARRHEA: 1
WHEEZING: 0
BLOOD IN STOOL: 1
BACK PAIN: 0
SHORTNESS OF BREATH: 1
COUGH: 0

## 2019-03-08 DIAGNOSIS — D50.0 CHRONIC BLOOD LOSS ANEMIA: Primary | ICD-10-CM

## 2019-03-11 ENCOUNTER — OFFICE VISIT (OUTPATIENT)
Dept: ONCOLOGY | Age: 34
End: 2019-03-11
Payer: COMMERCIAL

## 2019-03-11 ENCOUNTER — HOSPITAL ENCOUNTER (OUTPATIENT)
Dept: INFUSION THERAPY | Age: 34
Discharge: HOME | End: 2019-03-11
Payer: COMMERCIAL

## 2019-03-11 VITALS
BODY MASS INDEX: 27.22 KG/M2 | DIASTOLIC BLOOD PRESSURE: 60 MMHG | OXYGEN SATURATION: 100 % | HEIGHT: 65 IN | HEART RATE: 92 BPM | TEMPERATURE: 98.2 F | WEIGHT: 163.4 LBS | SYSTOLIC BLOOD PRESSURE: 100 MMHG | RESPIRATION RATE: 14 BRPM

## 2019-03-11 DIAGNOSIS — D50.0 IRON DEFICIENCY ANEMIA DUE TO CHRONIC BLOOD LOSS: Primary | ICD-10-CM

## 2019-03-11 DIAGNOSIS — Z3A.13 13 WEEKS GESTATION OF PREGNANCY: ICD-10-CM

## 2019-03-11 DIAGNOSIS — D50.0 CHRONIC BLOOD LOSS ANEMIA: ICD-10-CM

## 2019-03-11 DIAGNOSIS — K51.011 ULCERATIVE PANCOLITIS WITH RECTAL BLEEDING (HCC): ICD-10-CM

## 2019-03-11 LAB
BASINOPHIL, AUTOMATED: 0 % (ref 0–3)
EOSINOPHILS RELATIVE PERCENT: 3 % (ref 0–4)
HCT VFR BLD CALC: 33.8 % (ref 37–47)
HEMOGLOBIN: 11.5 GM/DL (ref 12–16)
IRON: 122 UG/DL (ref 50–170)
LYMPHOCYTES # BLD: 35 % (ref 15–47)
MCH RBC QN AUTO: 28.2 PG (ref 27–31)
MCHC RBC AUTO-ENTMCNC: 34.1 GM/DL (ref 33–37)
MCV RBC AUTO: 83 FL (ref 81–99)
MONOCYTES: 6 % (ref 0–12)
PDW BLD-RTO: 14.7 % (ref 11.5–14.5)
PLATELET # BLD: 269 THOU/MM3 (ref 130–400)
PMV BLD AUTO: 8.2 FL (ref 7.4–10.4)
RBC # BLD: 4.09 MILL/MM3 (ref 4.2–5.4)
SEG NEUTROPHILS: 56 % (ref 43–75)
WBC # BLD: 6.1 THOU/MM3 (ref 4.8–10.8)

## 2019-03-11 PROCEDURE — 99211 OFF/OP EST MAY X REQ PHY/QHP: CPT

## 2019-03-11 PROCEDURE — 83540 ASSAY OF IRON: CPT

## 2019-03-11 PROCEDURE — 99213 OFFICE O/P EST LOW 20 MIN: CPT | Performed by: INTERNAL MEDICINE

## 2019-03-11 PROCEDURE — 85025 COMPLETE CBC W/AUTO DIFF WBC: CPT

## 2019-03-11 PROCEDURE — 36415 COLL VENOUS BLD VENIPUNCTURE: CPT

## 2019-03-11 ASSESSMENT — ENCOUNTER SYMPTOMS
TROUBLE SWALLOWING: 0
VOMITING: 0
DIARRHEA: 1
NAUSEA: 0
ABDOMINAL PAIN: 1
COLOR CHANGE: 0
BLOOD IN STOOL: 1
WHEEZING: 0
CHEST TIGHTNESS: 1
COUGH: 0
CONSTIPATION: 1
SORE THROAT: 0
SHORTNESS OF BREATH: 1
FACIAL SWELLING: 0
RECTAL PAIN: 0
BACK PAIN: 0
ABDOMINAL DISTENTION: 0
EYE DISCHARGE: 0

## 2019-05-10 DIAGNOSIS — D50.0 IRON DEFICIENCY ANEMIA DUE TO CHRONIC BLOOD LOSS: Primary | ICD-10-CM

## 2019-05-13 ENCOUNTER — TELEPHONE (OUTPATIENT)
Dept: ONCOLOGY | Age: 34
End: 2019-05-13

## 2019-06-20 ENCOUNTER — HOSPITAL ENCOUNTER (OUTPATIENT)
Dept: INFUSION THERAPY | Age: 34
Discharge: HOME | End: 2019-06-20
Payer: COMMERCIAL

## 2019-06-20 ENCOUNTER — OFFICE VISIT (OUTPATIENT)
Dept: ONCOLOGY | Age: 34
End: 2019-06-20
Payer: COMMERCIAL

## 2019-06-20 VITALS
DIASTOLIC BLOOD PRESSURE: 78 MMHG | HEART RATE: 96 BPM | HEIGHT: 65 IN | BODY MASS INDEX: 30.09 KG/M2 | WEIGHT: 180.6 LBS | TEMPERATURE: 98.7 F | RESPIRATION RATE: 18 BRPM | OXYGEN SATURATION: 96 % | SYSTOLIC BLOOD PRESSURE: 123 MMHG

## 2019-06-20 DIAGNOSIS — D50.0 IRON DEFICIENCY ANEMIA DUE TO CHRONIC BLOOD LOSS: ICD-10-CM

## 2019-06-20 DIAGNOSIS — Z3A.30 30 WEEKS GESTATION OF PREGNANCY: ICD-10-CM

## 2019-06-20 DIAGNOSIS — D50.0 IRON DEFICIENCY ANEMIA DUE TO CHRONIC BLOOD LOSS: Primary | ICD-10-CM

## 2019-06-20 DIAGNOSIS — D64.9 ANEMIA, UNSPECIFIED TYPE: ICD-10-CM

## 2019-06-20 DIAGNOSIS — K51.011 ULCERATIVE PANCOLITIS WITH RECTAL BLEEDING (HCC): ICD-10-CM

## 2019-06-20 LAB
BASINOPHIL, AUTOMATED: 0 % (ref 0–3)
EOSINOPHILS RELATIVE PERCENT: 2 % (ref 0–4)
FERRITIN: 5 NG/ML (ref 10–291)
HCT VFR BLD CALC: 28.2 % (ref 37–47)
HEMOGLOBIN: 9.6 GM/DL (ref 12–16)
IRON: 23 UG/DL (ref 50–170)
LYMPHOCYTES # BLD: 26 % (ref 15–47)
MCH RBC QN AUTO: 27.7 PG (ref 27–31)
MCHC RBC AUTO-ENTMCNC: 33.9 GM/DL (ref 33–37)
MCV RBC AUTO: 82 FL (ref 81–99)
MONOCYTES: 8 % (ref 0–12)
PDW BLD-RTO: 10.6 % (ref 11.5–14.5)
PLATELET # BLD: 330 THOU/MM3 (ref 130–400)
PMV BLD AUTO: 7.8 FL (ref 7.4–10.4)
RBC # BLD: 3.46 MILL/MM3 (ref 4.2–5.4)
SEG NEUTROPHILS: 64 % (ref 43–75)
TOTAL IRON BINDING CAPACITY: 470 UG/DL (ref 171–450)
WBC # BLD: 7 THOU/MM3 (ref 4.8–10.8)

## 2019-06-20 PROCEDURE — 85025 COMPLETE CBC W/AUTO DIFF WBC: CPT

## 2019-06-20 PROCEDURE — 36415 COLL VENOUS BLD VENIPUNCTURE: CPT

## 2019-06-20 PROCEDURE — 99211 OFF/OP EST MAY X REQ PHY/QHP: CPT

## 2019-06-20 PROCEDURE — 83540 ASSAY OF IRON: CPT

## 2019-06-20 PROCEDURE — 83550 IRON BINDING TEST: CPT

## 2019-06-20 PROCEDURE — 82728 ASSAY OF FERRITIN: CPT

## 2019-06-20 PROCEDURE — 99214 OFFICE O/P EST MOD 30 MIN: CPT | Performed by: INTERNAL MEDICINE

## 2019-06-20 ASSESSMENT — ENCOUNTER SYMPTOMS
NAUSEA: 0
ABDOMINAL DISTENTION: 0
SORE THROAT: 0
CHEST TIGHTNESS: 1
WHEEZING: 0
TROUBLE SWALLOWING: 0
BLOOD IN STOOL: 1
ABDOMINAL PAIN: 1
SHORTNESS OF BREATH: 1
FACIAL SWELLING: 0
BACK PAIN: 0
RECTAL PAIN: 0
COLOR CHANGE: 0
COUGH: 0
EYE DISCHARGE: 0
DIARRHEA: 1
CONSTIPATION: 1
VOMITING: 0

## 2019-06-20 NOTE — PROGRESS NOTES
[facility] [address] [facility] [address]  [address] [address]  Dept: [phone]  Dept Fax: [phone]: [phone]    Visit Date:6/20/2019     Jayson Montiel is a 35 y.o. female who presents today for:   Chief Complaint   Patient presents with    Follow-up     Iron Deficiency Anemia due to chronic blood loss        HPI:   Iggy Mccallum is a 35 y.o. female who presents to [address] [place] [address] at [address] [facility] to establish care with new provider  for management of microcytic hypochromic anemia. She has known history of iron deficiency anemia in the setting of ulcerative colitis. She was hospitalized from November 29, 2018 to December 1, 2018 for chronic blood loss anemia due to GI bleed. Upon admission her hemoglobin was 6.1. She was transfused 2 units of PRBCs. CT of the abdomen and pelvis on 11/30/18 showed mild thickening and inflammation of the distal colon consistent with the history of ulcerative colitis. She was given steroids for possible UC flare and initially given atbx. She was d/c home with steroid taper, continue the Liada, and atbx of GI choice at d/c. I saw the patient on December 6, 2018 and due to her being symptomatic the patient received iron infusion. Interim history on 06/20/2019:  Patient presents for 3 months follow-up . She denies having any active bleeding. Her ulcerative colitis is under good control. Her pregnancy is progressing appropriately  Patient reports worsening tiredness, she has craving for eyes. She reports occasional dizziness no syncopal episode. HPI   Past Medical History:   Diagnosis Date    Asthma     Bruises easily     Circulation problem     Histoplasmosis     Ulcerative colitis ([address]       Past Surgical History:   Procedure Laterality Date    COLONOSCOPY  [id number]    unk    LUNG BIOPSY      SIGMOIDOSCOPY  2017      Family History   Problem Relation Age of Onset    Cancer Mother NON HODGINS LYMPHOMA    Asthma Mother     Other Mother         MULTIPLE SCLEROSIS-NEUROPATHY- FIBROMYALGA    Other Father         CATARAX    Cancer Maternal Aunt         NON HODGINS LYMPHOMA    Diabetes Paternal Uncle     Cancer Maternal Grandfather         LUNG    Other Paternal Grandmother         MACULAR DEGENERATION    Colon Cancer Neg Hx       Social History     Tobacco Use    Smoking status: Never Smoker    Smokeless tobacco: Never Used   Substance Use Topics    Alcohol use: No      Current Outpatient Medications   Medication Sig Dispense Refill    mesalamine (LIALDA) 1.2 g EC tablet Take 4 tablets by mouth daily 120 tablet 6    Prenatal MV-Min-Fe Fum-FA-DHA (PRENATAL 1 PO) Take by mouth daily      Acetaminophen-Codeine (TYLENOL WITH CODEINE #3 PO) Take by mouth as needed      aspirin-acetaminophen-caffeine (EXCEDRIN MIGRAINE) 250-250-65 MG per tablet Take 2 tablets by mouth every 6 hours as needed for Headaches      mesalamine (CANASA) 1000 MG suppository Place 1 suppository rectally nightly 30 suppository 1     No current facility-administered medications for this visit. Facility-Administered Medications Ordered in Other Visits   Medication Dose Route Frequency Provider Last Rate Last Dose    0.9 % sodium chloride infusion   Intravenous Continuous Alisha Chambers MD   Stopped at 06/25/19 1645      Allergies   Allergen Reactions    Nsaids Other (See Comments)     Not allowed to take NSAIDS      Health Maintenance   Topic Date Due    Varicella Vaccine (1 of 2 - 13+ 2-dose series) 08/10/1998    DTaP/Tdap/Td vaccine (1 - Tdap) 08/10/2004    Cervical cancer screen  08/10/2006    Flu vaccine (Season Ended) 09/01/2019    HIV screen  Completed    Pneumococcal 0-64 years Vaccine  Aged Out        Subjective:   Review of Systems   Constitutional: Positive for fatigue and fever. Negative for activity change and appetite change.    HENT: Negative for congestion, dental problem, facial swelling, hearing loss, mouth sores, nosebleeds, sore throat, tinnitus and trouble swallowing. Eyes: Negative for discharge and visual disturbance. Respiratory: Positive for chest tightness and shortness of breath. Negative for cough and wheezing. Cardiovascular: Negative for chest pain, palpitations and leg swelling. Gastrointestinal: Positive for abdominal pain, blood in stool, constipation and diarrhea. Negative for abdominal distention, nausea, rectal pain and vomiting. Endocrine: Negative for cold intolerance, polydipsia and polyuria. Genitourinary: Negative for decreased urine volume, difficulty urinating, dysuria, flank pain, hematuria and urgency. Musculoskeletal: Positive for arthralgias. Negative for back pain, gait problem, joint swelling, myalgias and neck stiffness. Skin: Negative for color change, rash and wound. Neurological: Positive for dizziness, light-headedness, numbness and headaches. Negative for tremors, seizures, speech difficulty and weakness. Hematological: Negative for adenopathy. Does not bruise/bleed easily. Psychiatric/Behavioral: Negative for confusion and sleep disturbance. The patient is not nervous/anxious. Objective:   Physical Exam   Constitutional: She is oriented to person, place, and time. She appears well-developed and well-nourished. No distress. HENT:   Head: Normocephalic. Mouth/Throat: Oropharynx is clear and moist. No oropharyngeal exudate. Eyes: Pupils are equal, round, and reactive to light. EOM are normal. Right eye exhibits no discharge. Left eye exhibits no discharge. No scleral icterus. Neck: Normal range of motion. Neck supple. No JVD present. No tracheal deviation present. No thyromegaly present. Cardiovascular: Normal rate and normal heart sounds. Exam reveals no gallop and no friction rub. No murmur heard. Pulmonary/Chest: Effort normal and breath sounds normal. No stridor. No respiratory distress. She has no wheezes. She has no rales. She exhibits no tenderness. Abdominal: Soft. Bowel sounds are normal. She exhibits no distension and no mass. There is no tenderness. There is no rebound. Musculoskeletal: Normal range of motion. She exhibits no edema. Good range of motion in all four extremities. Lymphadenopathy:     She has no cervical adenopathy. Neurological: She is alert and oriented to person, place, and time. She has normal reflexes. No cranial nerve deficit. She exhibits normal muscle tone. Skin: Skin is warm. No rash noted. No erythema. Psychiatric: She has a normal mood and affect. Her behavior is normal. Judgment and thought content normal.   Vitals reviewed. /78 (Site: Left Upper Arm, Position: Sitting, Cuff Size: Medium Adult)   Pulse 96   Temp 98.7 °F (37.1 °C) (Oral)   Resp 18   Ht 5' 5\" (1.651 m)   Wt 180 lb 9.6 oz (81.9 kg)   LMP 10/22/2018 (Within Days)   SpO2 96%   BMI 30.05 kg/m²      ECOG status is 1. Imaging studies and labs:   Xr Abdomen (kub) (single Ap View)    Result Date: 11/30/2018  PROCEDURE: XR ABDOMEN (KUB) (SINGLE AP VIEW) CLINICAL INFORMATION: UC, rectal bleeding, . COMPARISON: KUB dated 12/28/2011 TECHNIQUE: A supine AP view of the abdomen was obtained. FINDINGS: Bowel gas pattern: There is a nonobstructive bowel gas pattern with gas within nondistended small and large bowel. There is mild to moderate amount of stool in the colon. Free intraperitoneal air: none visualized Miscellaneous: There are small calcifications over the lower pole region of the left kidney consistent with renal calculi, similar compared to the prior study, largest measuring 3 to 4 mm. These were confirmed on the prior abdomen pelvis CT dated 9/23/2017 There are multiple phleboliths within the pelvis. Regional skeleton: There is minimal dextroscoliosis of the lower thoracic and upper lumbar spine. Non obstructive bowel gas pattern. Stable small left lower pole renal calculi.  **This report has been created using voice recognition software. It may contain minor errors which are inherent in voice recognition technology. ** Final report electronically signed by Dr. Arie Mccallum on 11/30/2018 2:36 AM    Ct Abdomen Pelvis W Iv Contrast Additional Contrast? None    Result Date: 11/30/2018  PROCEDURE: CT ABDOMEN PELVIS W IV CONTRAST CLINICAL INFORMATION: Ulcerative colitis, abdominal pain, rectal bleeding, r/o fistula . COMPARISON: CT 9/23/2017 TECHNIQUE: 5 mm axial CT images were obtained through the abdomen and pelvis after the administration of intravenous and oral contrast. Coronal and sagittal reconstructions were obtained. All CT scans at this facility use dose modulation, iterative reconstruction, and/or weight-based dosing when appropriate to reduce radiation dose to as low as reasonably achievable. FINDINGS Lung base: clear. Liver and spleen: homogeneous attenuation, no masses seen. Biliary: normal; no calculi. Pancreas: head, body, and tail unremarkable. Adrenals: symmetric, no calcifications or masses. Kidneys: Multiple fluid attenuation cysts bilaterally. Largest measures 2.2 cm on the right. There is a group of stones within the left kidney inferiorly with a combined dimension of 6 mm. Aorta: normal caliber. Lymph Nodes: Unopacified bowel loops limit assessment for lymph nodes. Bowel: There is diminished caliber of the descending colon and sigmoid colon. Its wall appears mildly thickened particularly within the rectosigmoid region. Acute inflammation is suspected. There is mild injection of the adjacent fat. No free air or definite abscess. Bladder: Normal Reproductive: Small cysts and follicles bilaterally. Bones: normal for age. Mild thickening and inflammation of the distal colon consistent with the history of ulcerative colitis. **This report has been created using voice recognition software. It may contain minor errors which are inherent in voice recognition technology. ** Final report electronically signed by Dr. Lizbeth Colvin on 11/30/2018 2:26 PM    Lab Results   Component Value Date    WBC 7.0 06/20/2019    HGB 9.6 (L) 06/20/2019    HCT 28.2 (L) 06/20/2019    MCV 82 06/20/2019     06/20/2019       Chemistry        Component Value Date/Time     12/01/2018 1007    K 4.3 12/01/2018 1007     12/01/2018 1007    CO2 22 (L) 12/01/2018 1007    BUN 11 12/01/2018 1007    CREATININE 1.0 12/01/2018 1007        Component Value Date/Time    CALCIUM 8.8 12/01/2018 1007    ALKPHOS 60 11/30/2018 0306    AST 18 11/30/2018 0306    ALT 10 (L) 11/30/2018 0306    BILITOT 0.2 (L) 11/30/2018 0306        Iron study on June 20, 2019 showed:   Ferritin: 5  Iron: 23  TIBC: 470    Assessment/Plan: 1. Iron deficiency anemia. Secondary to chronic GI blood loss secondary to ulcerative colitis. In November the patient received 2 units of PRBCs for hemoglobin 6.7. She was better after blood transfusion but she was still symptomatic from her iron deficiency anemia. Therefore in December 2018 she received intravenous iron supplementation. Her hemoglobin continued to improve. However her hemoglobin declined from 11.5 on March 11 2019 to 9.6 today. Her MCV is 82 today. Iron studies showed low ferritin at 5, low iron at 23 and high TIBC. Patient will receive iron infusion next week. We will recheck her CBC and iron studies in 7 weeks  2. Pregnancy. She is currently 30 weeks pregnant, and she tolerates pregnancy well. Diagnosis Orders   1. Iron deficiency anemia due to chronic blood loss     2. Ulcerative pancolitis with rectal bleeding ([address]     3. Anemia, unspecified type     4. 30 weeks gestation of pregnancy          Plan:   Return in about 7 weeks (around 8/8/2019). Orders Placed:   No orders of the defined types were placed in this encounter. Medications Prescribed:   No orders of the defined types were placed in this encounter.

## 2019-06-20 NOTE — PATIENT INSTRUCTIONS
1.  Feraheme infusion the next Tuesday and Friday   2. RTC in 7 weeks  3. oN RTC labs: CBC, ferritin and iron studies

## 2019-06-25 ENCOUNTER — HOSPITAL ENCOUNTER (OUTPATIENT)
Dept: INFUSION THERAPY | Age: 34
Discharge: HOME | End: 2019-06-25
Payer: COMMERCIAL

## 2019-06-25 VITALS
RESPIRATION RATE: 16 BRPM | DIASTOLIC BLOOD PRESSURE: 66 MMHG | HEIGHT: 65 IN | OXYGEN SATURATION: 98 % | BODY MASS INDEX: 30.06 KG/M2 | HEART RATE: 89 BPM | SYSTOLIC BLOOD PRESSURE: 116 MMHG | TEMPERATURE: 98.5 F | WEIGHT: 180.4 LBS

## 2019-06-25 DIAGNOSIS — K62.5 RECTAL BLEEDING: ICD-10-CM

## 2019-06-25 DIAGNOSIS — R06.02 SHORTNESS OF BREATH: ICD-10-CM

## 2019-06-25 DIAGNOSIS — D50.0 CHRONIC BLOOD LOSS ANEMIA: ICD-10-CM

## 2019-06-25 DIAGNOSIS — N18.2 CKD (CHRONIC KIDNEY DISEASE), STAGE II: Primary | ICD-10-CM

## 2019-06-25 PROCEDURE — 96365 THER/PROPH/DIAG IV INF INIT: CPT

## 2019-06-25 PROCEDURE — 2580000003 HC RX 258: Performed by: INTERNAL MEDICINE

## 2019-06-25 PROCEDURE — 2709999900 HC NON-CHARGEABLE SUPPLY

## 2019-06-25 PROCEDURE — 6360000002 HC RX W HCPCS: Performed by: INTERNAL MEDICINE

## 2019-06-25 RX ORDER — METHYLPREDNISOLONE SODIUM SUCCINATE 125 MG/2ML
125 INJECTION, POWDER, LYOPHILIZED, FOR SOLUTION INTRAMUSCULAR; INTRAVENOUS ONCE
Status: CANCELLED | OUTPATIENT
Start: 2019-07-02

## 2019-06-25 RX ORDER — SODIUM CHLORIDE 9 MG/ML
INJECTION, SOLUTION INTRAVENOUS CONTINUOUS
Status: CANCELLED | OUTPATIENT
Start: 2019-07-02

## 2019-06-25 RX ORDER — SODIUM CHLORIDE 0.9 % (FLUSH) 0.9 %
5 SYRINGE (ML) INJECTION PRN
Status: CANCELLED | OUTPATIENT
Start: 2019-06-25

## 2019-06-25 RX ORDER — ACETAMINOPHEN, ASPIRIN AND CAFFEINE 250; 250; 65 MG/1; MG/1; MG/1
2 TABLET, FILM COATED ORAL EVERY 6 HOURS PRN
COMMUNITY

## 2019-06-25 RX ORDER — SODIUM CHLORIDE 9 MG/ML
INJECTION, SOLUTION INTRAVENOUS CONTINUOUS
Status: DISCONTINUED | OUTPATIENT
Start: 2019-06-25 | End: 2019-06-26 | Stop reason: HOSPADM

## 2019-06-25 RX ORDER — SODIUM CHLORIDE 0.9 % (FLUSH) 0.9 %
10 SYRINGE (ML) INJECTION PRN
Status: CANCELLED | OUTPATIENT
Start: 2019-07-02

## 2019-06-25 RX ORDER — HEPARIN SODIUM (PORCINE) LOCK FLUSH IV SOLN 100 UNIT/ML 100 UNIT/ML
500 SOLUTION INTRAVENOUS PRN
Status: CANCELLED | OUTPATIENT
Start: 2019-06-25

## 2019-06-25 RX ORDER — HEPARIN SODIUM (PORCINE) LOCK FLUSH IV SOLN 100 UNIT/ML 100 UNIT/ML
500 SOLUTION INTRAVENOUS PRN
Status: CANCELLED | OUTPATIENT
Start: 2019-07-02

## 2019-06-25 RX ORDER — SODIUM CHLORIDE 9 MG/ML
INJECTION, SOLUTION INTRAVENOUS CONTINUOUS
Status: CANCELLED | OUTPATIENT
Start: 2019-06-25

## 2019-06-25 RX ORDER — DIPHENHYDRAMINE HYDROCHLORIDE 50 MG/ML
50 INJECTION INTRAMUSCULAR; INTRAVENOUS ONCE
Status: CANCELLED | OUTPATIENT
Start: 2019-06-25

## 2019-06-25 RX ORDER — DIPHENHYDRAMINE HYDROCHLORIDE 50 MG/ML
50 INJECTION INTRAMUSCULAR; INTRAVENOUS ONCE
Status: CANCELLED | OUTPATIENT
Start: 2019-07-02

## 2019-06-25 RX ORDER — 0.9 % SODIUM CHLORIDE 0.9 %
10 VIAL (ML) INJECTION ONCE
Status: CANCELLED | OUTPATIENT
Start: 2019-06-25

## 2019-06-25 RX ORDER — SODIUM CHLORIDE 0.9 % (FLUSH) 0.9 %
10 SYRINGE (ML) INJECTION PRN
Status: CANCELLED | OUTPATIENT
Start: 2019-06-25

## 2019-06-25 RX ORDER — METHYLPREDNISOLONE SODIUM SUCCINATE 125 MG/2ML
125 INJECTION, POWDER, LYOPHILIZED, FOR SOLUTION INTRAMUSCULAR; INTRAVENOUS ONCE
Status: CANCELLED | OUTPATIENT
Start: 2019-06-25

## 2019-06-25 RX ORDER — SODIUM CHLORIDE 0.9 % (FLUSH) 0.9 %
5 SYRINGE (ML) INJECTION PRN
Status: CANCELLED | OUTPATIENT
Start: 2019-07-02

## 2019-06-25 RX ORDER — 0.9 % SODIUM CHLORIDE 0.9 %
10 VIAL (ML) INJECTION ONCE
Status: CANCELLED | OUTPATIENT
Start: 2019-07-02

## 2019-06-25 RX ADMIN — FERUMOXYTOL 510 MG: 510 INJECTION INTRAVENOUS at 16:00

## 2019-06-25 RX ADMIN — SODIUM CHLORIDE: 9 INJECTION, SOLUTION INTRAVENOUS at 15:52

## 2019-06-25 NOTE — PLAN OF CARE
Problem: Intellectual/Education/Knowledge Deficit  Goal: Teaching initiated upon admission  Outcome: Met This Shift  Note:   Patient verbalizes understanding to verbal information given on feraheme,action and possible side effects. Aware to call MD if develop complications. Intervention: Verbal/written education provided  Note:   Discuss understanding to verbal information given on feraheme infusion. Problem: Discharge Planning  Goal: Knowledge of discharge instructions  Description  Knowledge of discharge instructions     Outcome: Met This Shift  Note:   Verbalize understanding of discharge instructions, follow up appointments, and when to call Physician. Intervention: Discharge to appropriate level of care  Note:   Provide discharge instructions. Problem: Musculor/Skeletal Functional Status  Goal: Absence of falls  Outcome: Met This Shift  Note:   Free from falls while in [facility]. Intervention: Provide standby assistance  Note:   Discussed the need to use the call light for assistance when getting up to ambulate. Call light within reach. Care plan reviewed with patient. Patient  verbalize understanding of the plan of care and contribute to goal setting.

## 2019-06-25 NOTE — PROGRESS NOTES
Patient assessed for the following post feraheme:    Dizziness   No  Lightheadedness  No     Acute nausea/vomiting No  Headache   No  Chest pain/pressure  No  Rash/itching   No  Shortness of breath  No    Patient kept for 20 minutes observation post feraheme infusion. Patient tolerated feraheme infusion without any complications. Last vital signs:   /66   Pulse 89   Temp 98.5 °F (36.9 °C) (Oral)   Resp 16   Ht 5' 5\" (1.651 m)   Wt 180 lb 6.4 oz (81.8 kg)   LMP 10/22/2018 (Within Days)   SpO2 98%   BMI 30.02 kg/m²     Patient instructed if experience any of the above symptoms following today's infusion,he/she is to notify MD immediately or go to the emergency department. Discharge instructions given to patient. Verbalizes understanding. Ambulated off unit per self with belongings.

## 2019-06-28 ENCOUNTER — HOSPITAL ENCOUNTER (OUTPATIENT)
Dept: INFUSION THERAPY | Age: 34
Discharge: HOME | End: 2019-06-28
Payer: COMMERCIAL

## 2019-06-28 VITALS
HEART RATE: 91 BPM | TEMPERATURE: 98.3 F | WEIGHT: 180 LBS | HEIGHT: 65 IN | BODY MASS INDEX: 29.99 KG/M2 | SYSTOLIC BLOOD PRESSURE: 107 MMHG | OXYGEN SATURATION: 97 % | RESPIRATION RATE: 16 BRPM | DIASTOLIC BLOOD PRESSURE: 60 MMHG

## 2019-06-28 DIAGNOSIS — N18.2 CKD (CHRONIC KIDNEY DISEASE), STAGE II: Primary | ICD-10-CM

## 2019-06-28 DIAGNOSIS — D50.0 CHRONIC BLOOD LOSS ANEMIA: ICD-10-CM

## 2019-06-28 DIAGNOSIS — K62.5 RECTAL BLEEDING: ICD-10-CM

## 2019-06-28 DIAGNOSIS — R06.02 SHORTNESS OF BREATH: ICD-10-CM

## 2019-06-28 PROCEDURE — 2709999900 HC NON-CHARGEABLE SUPPLY

## 2019-06-28 PROCEDURE — 6360000002 HC RX W HCPCS: Performed by: INTERNAL MEDICINE

## 2019-06-28 PROCEDURE — 96374 THER/PROPH/DIAG INJ IV PUSH: CPT

## 2019-06-28 PROCEDURE — 2580000003 HC RX 258: Performed by: INTERNAL MEDICINE

## 2019-06-28 RX ORDER — DIPHENHYDRAMINE HYDROCHLORIDE 50 MG/ML
50 INJECTION INTRAMUSCULAR; INTRAVENOUS ONCE
Status: CANCELLED | OUTPATIENT
Start: 2019-07-02

## 2019-06-28 RX ORDER — SODIUM CHLORIDE 9 MG/ML
INJECTION, SOLUTION INTRAVENOUS CONTINUOUS
Status: CANCELLED | OUTPATIENT
Start: 2019-07-02

## 2019-06-28 RX ORDER — HEPARIN SODIUM (PORCINE) LOCK FLUSH IV SOLN 100 UNIT/ML 100 UNIT/ML
500 SOLUTION INTRAVENOUS PRN
Status: CANCELLED | OUTPATIENT
Start: 2019-07-02

## 2019-06-28 RX ORDER — SODIUM CHLORIDE 9 MG/ML
INJECTION, SOLUTION INTRAVENOUS CONTINUOUS
Status: DISCONTINUED | OUTPATIENT
Start: 2019-06-28 | End: 2019-06-29 | Stop reason: HOSPADM

## 2019-06-28 RX ORDER — SODIUM CHLORIDE 0.9 % (FLUSH) 0.9 %
10 SYRINGE (ML) INJECTION PRN
Status: CANCELLED | OUTPATIENT
Start: 2019-07-02

## 2019-06-28 RX ORDER — 0.9 % SODIUM CHLORIDE 0.9 %
10 VIAL (ML) INJECTION ONCE
Status: CANCELLED | OUTPATIENT
Start: 2019-07-02

## 2019-06-28 RX ORDER — CYCLOBENZAPRINE HCL 10 MG
10 TABLET ORAL 3 TIMES DAILY PRN
COMMUNITY
End: 2020-01-24

## 2019-06-28 RX ORDER — METHYLPREDNISOLONE SODIUM SUCCINATE 125 MG/2ML
125 INJECTION, POWDER, LYOPHILIZED, FOR SOLUTION INTRAMUSCULAR; INTRAVENOUS ONCE
Status: CANCELLED | OUTPATIENT
Start: 2019-07-02

## 2019-06-28 RX ORDER — SODIUM CHLORIDE 0.9 % (FLUSH) 0.9 %
5 SYRINGE (ML) INJECTION PRN
Status: CANCELLED | OUTPATIENT
Start: 2019-07-02

## 2019-06-28 RX ADMIN — SODIUM CHLORIDE: 9 INJECTION, SOLUTION INTRAVENOUS at 15:35

## 2019-06-28 RX ADMIN — FERUMOXYTOL 510 MG: 510 INJECTION INTRAVENOUS at 15:45

## 2019-06-28 NOTE — PLAN OF CARE
Problem: Intellectual/Education/Knowledge Deficit  Goal: Teaching initiated upon admission  Outcome: Met This Shift  Note:   Patient verbalizes understanding to verbal information given on Feraheme ,action and possible side effects. Aware to call MD if develop complications. Intervention: Verbal/written education provided  Note:   Feraheme reviewed, patient verbalizes understanding of medication being administered and potential side effects. Problem: Discharge Planning  Goal: Knowledge of discharge instructions  Description  Knowledge of discharge instructions     Outcome: Met This ShiftNote:   Verbalized understanding of discharge instructions, follow-up appointments, and when to call the physician. Intervention: Discharge to appropriate level of care  Note:   Discuss understanding of discharge instructions,follow-up appointments, and when to call the physician. Care plan reviewed with patient. Patient verbalize understanding of the plan of care and contribute to goal setting.

## 2019-06-28 NOTE — PROGRESS NOTES
Patient assessed for the following post iron infusion:    Dizziness   No  Lightheadedness  No      Acute nausea/vomiting No  Headache   No  Chest pain/pressure  No  Rash/itching   No  Shortness of breath  No    Patient kept for 20 minutes observation post infusion iron. Patient tolerated iron treatment without any complications. Last vital signs:   /60   Pulse 91   Temp 98.3 °F (36.8 °C) (Oral)   Resp 16   Ht 5' 5\" (1.651 m)   Wt 180 lb (81.6 kg)   LMP 10/22/2018 (Within Days)   SpO2 97%   BMI 29.95 kg/m²     Patient instructed if experience any of the above symptoms following today's infusion, she is to notify MD immediately or go to the emergency department. Discharge instructions given to patient. Verbalizes understanding. Ambulated off unit per self, with belongings.

## 2019-08-07 DIAGNOSIS — D50.0 IRON DEFICIENCY ANEMIA DUE TO CHRONIC BLOOD LOSS: Primary | ICD-10-CM

## 2019-08-08 ENCOUNTER — HOSPITAL ENCOUNTER (OUTPATIENT)
Dept: INFUSION THERAPY | Age: 34
Discharge: HOME | End: 2019-08-08
Payer: COMMERCIAL

## 2019-08-08 ENCOUNTER — OFFICE VISIT (OUTPATIENT)
Dept: ONCOLOGY | Age: 34
End: 2019-08-08
Payer: COMMERCIAL

## 2019-08-08 VITALS
DIASTOLIC BLOOD PRESSURE: 79 MMHG | OXYGEN SATURATION: 97 % | SYSTOLIC BLOOD PRESSURE: 114 MMHG | TEMPERATURE: 97.7 F | BODY MASS INDEX: 31.89 KG/M2 | HEIGHT: 65 IN | WEIGHT: 191.4 LBS | RESPIRATION RATE: 18 BRPM | HEART RATE: 97 BPM

## 2019-08-08 DIAGNOSIS — Z3A.34 34 WEEKS GESTATION OF PREGNANCY: ICD-10-CM

## 2019-08-08 DIAGNOSIS — D50.0 IRON DEFICIENCY ANEMIA DUE TO CHRONIC BLOOD LOSS: ICD-10-CM

## 2019-08-08 DIAGNOSIS — K51.011 ULCERATIVE PANCOLITIS WITH RECTAL BLEEDING (HCC): ICD-10-CM

## 2019-08-08 DIAGNOSIS — D50.0 IRON DEFICIENCY ANEMIA DUE TO CHRONIC BLOOD LOSS: Primary | ICD-10-CM

## 2019-08-08 LAB
BASOPHILS # BLD: 0.1 %
BASOPHILS ABSOLUTE: 0 THOU/MM3 (ref 0–0.1)
EOSINOPHIL # BLD: 2.1 %
EOSINOPHILS ABSOLUTE: 0.2 THOU/MM3 (ref 0–0.4)
ERYTHROCYTE [DISTWIDTH] IN BLOOD BY AUTOMATED COUNT: 18.6 % (ref 11.5–14.5)
ERYTHROCYTE [DISTWIDTH] IN BLOOD BY AUTOMATED COUNT: 60 FL (ref 35–45)
FERRITIN: 21 NG/ML (ref 10–291)
HCT VFR BLD CALC: 34.5 % (ref 37–47)
HEMOGLOBIN: 10.9 GM/DL (ref 12–16)
IMMATURE GRANS (ABS): 0.05 THOU/MM3 (ref 0–0.07)
IMMATURE GRANULOCYTES: 0.6 %
IRON SATURATION: 10 % (ref 20–50)
IRON: 46 UG/DL (ref 50–170)
LYMPHOCYTES # BLD: 24.3 %
LYMPHOCYTES ABSOLUTE: 1.9 THOU/MM3 (ref 1–4.8)
MCH RBC QN AUTO: 28.2 PG (ref 26–33)
MCHC RBC AUTO-ENTMCNC: 31.6 GM/DL (ref 32.2–35.5)
MCV RBC AUTO: 89.4 FL (ref 81–99)
MONOCYTES # BLD: 8.6 %
MONOCYTES ABSOLUTE: 0.7 THOU/MM3 (ref 0.4–1.3)
NUCLEATED RED BLOOD CELLS: 0 /100 WBC
PLATELET # BLD: 285 THOU/MM3 (ref 130–400)
PMV BLD AUTO: 11.1 FL (ref 9.4–12.4)
RBC # BLD: 3.86 MILL/MM3 (ref 4.2–5.4)
SEG NEUTROPHILS: 64.3 %
SEGMENTED NEUTROPHILS ABSOLUTE COUNT: 5.1 THOU/MM3 (ref 1.8–7.7)
TOTAL IRON BINDING CAPACITY: 471 UG/DL (ref 171–450)
WBC # BLD: 8 THOU/MM3 (ref 4.8–10.8)

## 2019-08-08 PROCEDURE — 83550 IRON BINDING TEST: CPT

## 2019-08-08 PROCEDURE — 36415 COLL VENOUS BLD VENIPUNCTURE: CPT

## 2019-08-08 PROCEDURE — 85025 COMPLETE CBC W/AUTO DIFF WBC: CPT

## 2019-08-08 PROCEDURE — 99211 OFF/OP EST MAY X REQ PHY/QHP: CPT

## 2019-08-08 PROCEDURE — 82728 ASSAY OF FERRITIN: CPT

## 2019-08-08 PROCEDURE — 99213 OFFICE O/P EST LOW 20 MIN: CPT | Performed by: INTERNAL MEDICINE

## 2019-08-08 PROCEDURE — 83540 ASSAY OF IRON: CPT

## 2019-08-20 ENCOUNTER — HOSPITAL ENCOUNTER (OUTPATIENT)
Age: 34
Setting detail: SPECIMEN
Discharge: HOME | End: 2019-08-20
Payer: COMMERCIAL

## 2019-08-20 PROCEDURE — 87081 CULTURE SCREEN ONLY: CPT

## 2019-08-20 PROCEDURE — 87653 STREP B DNA AMP PROBE: CPT

## 2019-08-21 LAB
GROUP B STREP CULTURE: ABNORMAL
ORGANISM: ABNORMAL

## 2019-08-30 PROBLEM — O47.9 FALSE LABOR: Status: ACTIVE | Noted: 2019-08-30

## 2019-09-03 ASSESSMENT — ENCOUNTER SYMPTOMS
SORE THROAT: 0
NAUSEA: 0
RECTAL PAIN: 0
BACK PAIN: 0
TROUBLE SWALLOWING: 0
COUGH: 0
DIARRHEA: 1
SHORTNESS OF BREATH: 1
FACIAL SWELLING: 0
CONSTIPATION: 1
COLOR CHANGE: 0
ABDOMINAL DISTENTION: 0
CHEST TIGHTNESS: 1
ABDOMINAL PAIN: 1
VOMITING: 0
BLOOD IN STOOL: 1
EYE DISCHARGE: 0
WHEEZING: 0

## 2019-09-03 NOTE — PROGRESS NOTES
[facility] [facility] [address] [facility] [address]  [address] [address]  Dept: [phone]  Dept Fax: [phone]: [phone]    Visit Date:8/8/2019     Glynn Denny is a 29 y.o. female who presents today for:   Chief Complaint   Patient presents with    Follow-up     IRON DEFICIENCY ANEMIA        HPI:   Karen Oconnell is a 35 y.o. female who presents to [address] [place] [address] at [address] [facility] [address] to establish care with new provider  for management of microcytic hypochromic anemia. She has known history of iron deficiency anemia in the setting of ulcerative colitis. She was hospitalized from November 29, 2018 to December 1, 2018 for chronic blood loss anemia due to GI bleed. Upon admission her hemoglobin was 6.1. She was transfused 2 units of PRBCs. CT of the abdomen and pelvis on 11/30/18 showed mild thickening and inflammation of the distal colon consistent with the history of ulcerative colitis. She was given steroids for possible UC flare and initially given atbx. She was d/c home with steroid taper, continue the Liada, and atbx of GI choice at d/c. I saw the patient on December 6, 2018 and due to her being symptomatic the patient received iron infusion. Interim history on 08/12/2019:  Patient presents for 2 months follow-up . She denies having any active bleeding. Her ulcerative colitis is under good control. Her pregnancy is progressing appropriately  Patient reports worsening tiredness, occasional dizziness no syncopal episode. HPI   Past Medical History:   Diagnosis Date    Anemia     Bruises easily     Histoplasmosis     Mental disorder     anxiety    Ulcerative colitis ([address]       Past Surgical History:   Procedure Laterality Date    COLONOSCOPY  [id number]    unk    LUNG BIOPSY      SIGMOIDOSCOPY  2017      Family History   Problem Relation Age of Onset    Cancer Mother         NON HODGINS LYMPHOMA    Asthma Mother     Other

## 2019-09-05 ENCOUNTER — APPOINTMENT (OUTPATIENT)
Dept: LABOR AND DELIVERY | Age: 34
End: 2019-09-05
Payer: COMMERCIAL

## 2019-09-05 ENCOUNTER — HOSPITAL ENCOUNTER (INPATIENT)
Age: 34
LOS: 2 days | Discharge: HOME | End: 2019-09-07
Attending: OBSTETRICS & GYNECOLOGY | Admitting: OBSTETRICS & GYNECOLOGY
Payer: COMMERCIAL

## 2019-09-05 ENCOUNTER — ANESTHESIA (OUTPATIENT)
Dept: LABOR AND DELIVERY | Age: 34
End: 2019-09-05
Payer: COMMERCIAL

## 2019-09-05 ENCOUNTER — ANESTHESIA EVENT (OUTPATIENT)
Dept: LABOR AND DELIVERY | Age: 34
End: 2019-09-05
Payer: COMMERCIAL

## 2019-09-05 LAB
ABO: NORMAL
AMPHETAMINE+METHAMPHETAMINE URINE SCREEN: NEGATIVE
ANTIBODY SCREEN: NORMAL
BARBITURATE QUANTITATIVE URINE: NEGATIVE
BASOPHILS # BLD: 0.4 %
BASOPHILS ABSOLUTE: 0 THOU/MM3 (ref 0–0.1)
BENZODIAZEPINE QUANTITATIVE URINE: NEGATIVE
CANNABINOID QUANTITATIVE URINE: NEGATIVE
COCAINE METABOLITE QUANTITATIVE URINE: NEGATIVE
EOSINOPHIL # BLD: 3.3 %
EOSINOPHILS ABSOLUTE: 0.3 THOU/MM3 (ref 0–0.4)
ERYTHROCYTE [DISTWIDTH] IN BLOOD BY AUTOMATED COUNT: 17.6 % (ref 11.5–14.5)
ERYTHROCYTE [DISTWIDTH] IN BLOOD BY AUTOMATED COUNT: 54.3 FL (ref 35–45)
HCT VFR BLD CALC: 27.9 % (ref 37–47)
HEMOGLOBIN: 9.1 GM/DL (ref 12–16)
IMMATURE GRANS (ABS): 0.11 THOU/MM3 (ref 0–0.07)
IMMATURE GRANULOCYTES: 1 %
LYMPHOCYTES # BLD: 25.5 %
LYMPHOCYTES ABSOLUTE: 2.1 THOU/MM3 (ref 1–4.8)
MCH RBC QN AUTO: 27.7 PG (ref 26–33)
MCHC RBC AUTO-ENTMCNC: 32.6 GM/DL (ref 32.2–35.5)
MCV RBC AUTO: 84.8 FL (ref 81–99)
MONOCYTES # BLD: 7.7 %
MONOCYTES ABSOLUTE: 0.6 THOU/MM3 (ref 0.4–1.3)
NUCLEATED RED BLOOD CELLS: 0 /100 WBC
OPIATES, URINE: NEGATIVE
OXYCODONE: NEGATIVE
PHENCYCLIDINE QUANTITATIVE URINE: NEGATIVE
PLATELET # BLD: 334 THOU/MM3 (ref 130–400)
PMV BLD AUTO: 11.6 FL (ref 9.4–12.4)
RBC # BLD: 3.29 MILL/MM3 (ref 4.2–5.4)
RH FACTOR: NORMAL
RPR: NONREACTIVE
SEG NEUTROPHILS: 61.8 %
SEGMENTED NEUTROPHILS ABSOLUTE COUNT: 5.2 THOU/MM3 (ref 1.8–7.7)
WBC # BLD: 8.4 THOU/MM3 (ref 4.8–10.8)

## 2019-09-05 PROCEDURE — 3700000025 EPIDURAL BLOCK: Performed by: ANESTHESIOLOGY

## 2019-09-05 PROCEDURE — 3E033VJ INTRODUCTION OF OTHER HORMONE INTO PERIPHERAL VEIN, PERCUTANEOUS APPROACH: ICD-10-PCS | Performed by: OBSTETRICS & GYNECOLOGY

## 2019-09-05 PROCEDURE — 6370000000 HC RX 637 (ALT 250 FOR IP): Performed by: OBSTETRICS & GYNECOLOGY

## 2019-09-05 PROCEDURE — 2500000003 HC RX 250 WO HCPCS: Performed by: ANESTHESIOLOGY

## 2019-09-05 PROCEDURE — 7200000001 HC VAGINAL DELIVERY

## 2019-09-05 PROCEDURE — 86900 BLOOD TYPING SEROLOGIC ABO: CPT

## 2019-09-05 PROCEDURE — 6360000002 HC RX W HCPCS: Performed by: OBSTETRICS & GYNECOLOGY

## 2019-09-05 PROCEDURE — 6360000002 HC RX W HCPCS

## 2019-09-05 PROCEDURE — 85025 COMPLETE CBC W/AUTO DIFF WBC: CPT

## 2019-09-05 PROCEDURE — 2709999900 HC NON-CHARGEABLE SUPPLY

## 2019-09-05 PROCEDURE — 10907ZC DRAINAGE OF AMNIOTIC FLUID, THERAPEUTIC FROM PRODUCTS OF CONCEPTION, VIA NATURAL OR ARTIFICIAL OPENING: ICD-10-PCS | Performed by: OBSTETRICS & GYNECOLOGY

## 2019-09-05 PROCEDURE — 86592 SYPHILIS TEST NON-TREP QUAL: CPT

## 2019-09-05 PROCEDURE — 80307 DRUG TEST PRSMV CHEM ANLYZR: CPT

## 2019-09-05 PROCEDURE — 2580000003 HC RX 258: Performed by: OBSTETRICS & GYNECOLOGY

## 2019-09-05 PROCEDURE — 86850 RBC ANTIBODY SCREEN: CPT

## 2019-09-05 PROCEDURE — 1200000000 HC SEMI PRIVATE

## 2019-09-05 PROCEDURE — 86901 BLOOD TYPING SEROLOGIC RH(D): CPT

## 2019-09-05 PROCEDURE — 36415 COLL VENOUS BLD VENIPUNCTURE: CPT

## 2019-09-05 RX ORDER — SODIUM CHLORIDE 0.9 % (FLUSH) 0.9 %
10 SYRINGE (ML) INJECTION PRN
Status: DISCONTINUED | OUTPATIENT
Start: 2019-09-05 | End: 2019-09-05 | Stop reason: HOSPADM

## 2019-09-05 RX ORDER — KETOROLAC TROMETHAMINE 30 MG/ML
30 INJECTION, SOLUTION INTRAMUSCULAR; INTRAVENOUS ONCE
Status: COMPLETED | OUTPATIENT
Start: 2019-09-05 | End: 2019-09-05

## 2019-09-05 RX ORDER — ZOLPIDEM TARTRATE 5 MG/1
5 TABLET ORAL NIGHTLY PRN
Status: DISCONTINUED | OUTPATIENT
Start: 2019-09-05 | End: 2019-09-07 | Stop reason: HOSPADM

## 2019-09-05 RX ORDER — SODIUM CHLORIDE 0.9 % (FLUSH) 0.9 %
10 SYRINGE (ML) INJECTION EVERY 12 HOURS SCHEDULED
Status: DISCONTINUED | OUTPATIENT
Start: 2019-09-05 | End: 2019-09-07 | Stop reason: HOSPADM

## 2019-09-05 RX ORDER — FENTANYL CITRATE 50 UG/ML
INJECTION, SOLUTION INTRAMUSCULAR; INTRAVENOUS
Status: DISCONTINUED
Start: 2019-09-05 | End: 2019-09-05

## 2019-09-05 RX ORDER — ONDANSETRON 4 MG/1
8 TABLET, FILM COATED ORAL EVERY 8 HOURS PRN
Status: DISCONTINUED | OUTPATIENT
Start: 2019-09-05 | End: 2019-09-07 | Stop reason: HOSPADM

## 2019-09-05 RX ORDER — LANOLIN 100 %
OINTMENT (GRAM) TOPICAL PRN
Status: DISCONTINUED | OUTPATIENT
Start: 2019-09-05 | End: 2019-09-07 | Stop reason: HOSPADM

## 2019-09-05 RX ORDER — DOCUSATE SODIUM 100 MG/1
100 CAPSULE, LIQUID FILLED ORAL 2 TIMES DAILY
Status: DISCONTINUED | OUTPATIENT
Start: 2019-09-05 | End: 2019-09-07 | Stop reason: HOSPADM

## 2019-09-05 RX ORDER — ACETAMINOPHEN 325 MG/1
650 TABLET ORAL EVERY 4 HOURS PRN
Status: DISCONTINUED | OUTPATIENT
Start: 2019-09-05 | End: 2019-09-07 | Stop reason: HOSPADM

## 2019-09-05 RX ORDER — SEVOFLURANE 250 ML/250ML
1 LIQUID RESPIRATORY (INHALATION) CONTINUOUS PRN
Status: DISCONTINUED | OUTPATIENT
Start: 2019-09-05 | End: 2019-09-05 | Stop reason: HOSPADM

## 2019-09-05 RX ORDER — DIPHENHYDRAMINE HYDROCHLORIDE 50 MG/ML
25 INJECTION INTRAMUSCULAR; INTRAVENOUS EVERY 4 HOURS PRN
Status: DISCONTINUED | OUTPATIENT
Start: 2019-09-05 | End: 2019-09-05 | Stop reason: HOSPADM

## 2019-09-05 RX ORDER — IBUPROFEN 800 MG/1
800 TABLET ORAL EVERY 8 HOURS
Status: DISCONTINUED | OUTPATIENT
Start: 2019-09-05 | End: 2019-09-05

## 2019-09-05 RX ORDER — BUTORPHANOL TARTRATE 1 MG/ML
1 INJECTION, SOLUTION INTRAMUSCULAR; INTRAVENOUS
Status: DISCONTINUED | OUTPATIENT
Start: 2019-09-05 | End: 2019-09-05 | Stop reason: HOSPADM

## 2019-09-05 RX ORDER — ACETAMINOPHEN 325 MG/1
650 TABLET ORAL EVERY 4 HOURS PRN
Status: DISCONTINUED | OUTPATIENT
Start: 2019-09-05 | End: 2019-09-05 | Stop reason: HOSPADM

## 2019-09-05 RX ORDER — METHYLERGONOVINE MALEATE 0.2 MG/ML
200 INJECTION INTRAVENOUS PRN
Status: DISCONTINUED | OUTPATIENT
Start: 2019-09-05 | End: 2019-09-05 | Stop reason: HOSPADM

## 2019-09-05 RX ORDER — ONDANSETRON 2 MG/ML
4 INJECTION INTRAMUSCULAR; INTRAVENOUS EVERY 6 HOURS PRN
Status: DISCONTINUED | OUTPATIENT
Start: 2019-09-05 | End: 2019-09-07 | Stop reason: HOSPADM

## 2019-09-05 RX ORDER — MISOPROSTOL 200 UG/1
1000 TABLET ORAL PRN
Status: DISCONTINUED | OUTPATIENT
Start: 2019-09-05 | End: 2019-09-05 | Stop reason: HOSPADM

## 2019-09-05 RX ORDER — SODIUM CHLORIDE, SODIUM LACTATE, POTASSIUM CHLORIDE, CALCIUM CHLORIDE 600; 310; 30; 20 MG/100ML; MG/100ML; MG/100ML; MG/100ML
INJECTION, SOLUTION INTRAVENOUS CONTINUOUS
Status: DISCONTINUED | OUTPATIENT
Start: 2019-09-05 | End: 2019-09-07 | Stop reason: HOSPADM

## 2019-09-05 RX ORDER — METHYLERGONOVINE MALEATE 0.2 MG/ML
200 INJECTION INTRAVENOUS PRN
Status: DISCONTINUED | OUTPATIENT
Start: 2019-09-05 | End: 2019-09-07 | Stop reason: HOSPADM

## 2019-09-05 RX ORDER — HYDROCODONE BITARTRATE AND ACETAMINOPHEN 5; 325 MG/1; MG/1
2 TABLET ORAL EVERY 4 HOURS PRN
Status: DISCONTINUED | OUTPATIENT
Start: 2019-09-05 | End: 2019-09-07 | Stop reason: HOSPADM

## 2019-09-05 RX ORDER — ONDANSETRON 2 MG/ML
8 INJECTION INTRAMUSCULAR; INTRAVENOUS EVERY 6 HOURS PRN
Status: DISCONTINUED | OUTPATIENT
Start: 2019-09-05 | End: 2019-09-05 | Stop reason: HOSPADM

## 2019-09-05 RX ORDER — CARBOPROST TROMETHAMINE 250 UG/ML
250 INJECTION, SOLUTION INTRAMUSCULAR
Status: DISPENSED | OUTPATIENT
Start: 2019-09-05 | End: 2019-09-05

## 2019-09-05 RX ORDER — MORPHINE SULFATE 2 MG/ML
2 INJECTION, SOLUTION INTRAMUSCULAR; INTRAVENOUS
Status: DISCONTINUED | OUTPATIENT
Start: 2019-09-05 | End: 2019-09-05 | Stop reason: HOSPADM

## 2019-09-05 RX ORDER — FERROUS SULFATE 325(65) MG
325 TABLET ORAL
Status: DISCONTINUED | OUTPATIENT
Start: 2019-09-06 | End: 2019-09-07 | Stop reason: HOSPADM

## 2019-09-05 RX ORDER — LIDOCAINE HYDROCHLORIDE 10 MG/ML
30 INJECTION, SOLUTION EPIDURAL; INFILTRATION; INTRACAUDAL; PERINEURAL PRN
Status: DISCONTINUED | OUTPATIENT
Start: 2019-09-05 | End: 2019-09-05 | Stop reason: HOSPADM

## 2019-09-05 RX ORDER — SODIUM CHLORIDE 0.9 % (FLUSH) 0.9 %
10 SYRINGE (ML) INJECTION EVERY 12 HOURS SCHEDULED
Status: DISCONTINUED | OUTPATIENT
Start: 2019-09-05 | End: 2019-09-05

## 2019-09-05 RX ORDER — EPHEDRINE SULFATE/0.9% NACL/PF 50 MG/5 ML
5 SYRINGE (ML) INTRAVENOUS PRN
Status: DISCONTINUED | OUTPATIENT
Start: 2019-09-05 | End: 2019-09-05

## 2019-09-05 RX ORDER — DIPHENHYDRAMINE HCL 25 MG
25 TABLET ORAL EVERY 6 HOURS PRN
Status: DISCONTINUED | OUTPATIENT
Start: 2019-09-05 | End: 2019-09-07 | Stop reason: HOSPADM

## 2019-09-05 RX ORDER — KETOROLAC TROMETHAMINE 10 MG/1
10 TABLET, FILM COATED ORAL EVERY 6 HOURS PRN
Status: DISCONTINUED | OUTPATIENT
Start: 2019-09-05 | End: 2019-09-07 | Stop reason: HOSPADM

## 2019-09-05 RX ORDER — SODIUM CHLORIDE 0.9 % (FLUSH) 0.9 %
10 SYRINGE (ML) INJECTION PRN
Status: DISCONTINUED | OUTPATIENT
Start: 2019-09-05 | End: 2019-09-07 | Stop reason: HOSPADM

## 2019-09-05 RX ORDER — MORPHINE SULFATE 4 MG/ML
4 INJECTION, SOLUTION INTRAMUSCULAR; INTRAVENOUS
Status: DISCONTINUED | OUTPATIENT
Start: 2019-09-05 | End: 2019-09-07 | Stop reason: HOSPADM

## 2019-09-05 RX ORDER — TERBUTALINE SULFATE 1 MG/ML
0.25 INJECTION, SOLUTION SUBCUTANEOUS ONCE
Status: DISCONTINUED | OUTPATIENT
Start: 2019-09-05 | End: 2019-09-05 | Stop reason: HOSPADM

## 2019-09-05 RX ORDER — CARBOPROST TROMETHAMINE 250 UG/ML
250 INJECTION, SOLUTION INTRAMUSCULAR PRN
Status: DISCONTINUED | OUTPATIENT
Start: 2019-09-05 | End: 2019-09-07 | Stop reason: HOSPADM

## 2019-09-05 RX ORDER — SODIUM CHLORIDE, SODIUM LACTATE, POTASSIUM CHLORIDE, CALCIUM CHLORIDE 600; 310; 30; 20 MG/100ML; MG/100ML; MG/100ML; MG/100ML
INJECTION, SOLUTION INTRAVENOUS CONTINUOUS
Status: DISCONTINUED | OUTPATIENT
Start: 2019-09-05 | End: 2019-09-05

## 2019-09-05 RX ORDER — MORPHINE SULFATE 2 MG/ML
2 INJECTION, SOLUTION INTRAMUSCULAR; INTRAVENOUS
Status: DISCONTINUED | OUTPATIENT
Start: 2019-09-05 | End: 2019-09-07 | Stop reason: HOSPADM

## 2019-09-05 RX ORDER — PENICILLIN G 3000000 [IU]/50ML
3 INJECTION, SOLUTION INTRAVENOUS EVERY 4 HOURS
Status: DISCONTINUED | OUTPATIENT
Start: 2019-09-05 | End: 2019-09-05 | Stop reason: HOSPADM

## 2019-09-05 RX ORDER — CYCLOBENZAPRINE HCL 10 MG
10 TABLET ORAL 3 TIMES DAILY PRN
Status: DISCONTINUED | OUTPATIENT
Start: 2019-09-05 | End: 2019-09-07 | Stop reason: HOSPADM

## 2019-09-05 RX ORDER — HYDROCODONE BITARTRATE AND ACETAMINOPHEN 5; 325 MG/1; MG/1
1 TABLET ORAL EVERY 4 HOURS PRN
Status: DISCONTINUED | OUTPATIENT
Start: 2019-09-05 | End: 2019-09-07 | Stop reason: HOSPADM

## 2019-09-05 RX ORDER — MORPHINE SULFATE 4 MG/ML
4 INJECTION, SOLUTION INTRAMUSCULAR; INTRAVENOUS
Status: DISCONTINUED | OUTPATIENT
Start: 2019-09-05 | End: 2019-09-05 | Stop reason: HOSPADM

## 2019-09-05 RX ADMIN — MISOPROSTOL 1000 MCG: 200 TABLET ORAL at 13:03

## 2019-09-05 RX ADMIN — KETOROLAC TROMETHAMINE 30 MG: 30 INJECTION, SOLUTION INTRAMUSCULAR at 16:47

## 2019-09-05 RX ADMIN — SODIUM CHLORIDE, POTASSIUM CHLORIDE, SODIUM LACTATE AND CALCIUM CHLORIDE: 600; 310; 30; 20 INJECTION, SOLUTION INTRAVENOUS at 05:29

## 2019-09-05 RX ADMIN — Medication 1 MILLI-UNITS/MIN: at 06:20

## 2019-09-05 RX ADMIN — HYDROCODONE BITARTRATE AND ACETAMINOPHEN 2 TABLET: 5; 325 TABLET ORAL at 18:05

## 2019-09-05 RX ADMIN — BUTORPHANOL TARTRATE 1 MG: 1 INJECTION, SOLUTION INTRAMUSCULAR; INTRAVENOUS at 09:34

## 2019-09-05 RX ADMIN — PENICILLIN G 3 MILLION UNITS: 3000000 INJECTION, SOLUTION INTRAVENOUS at 10:29

## 2019-09-05 RX ADMIN — HYDROCODONE BITARTRATE AND ACETAMINOPHEN 2 TABLET: 5; 325 TABLET ORAL at 22:19

## 2019-09-05 RX ADMIN — DEXTROSE MONOHYDRATE 5 MILLION UNITS: 5 INJECTION INTRAVENOUS at 06:25

## 2019-09-05 RX ADMIN — HYDROCODONE BITARTRATE AND ACETAMINOPHEN 1 TABLET: 5; 325 TABLET ORAL at 14:00

## 2019-09-05 RX ADMIN — Medication 18 ML/HR: at 10:25

## 2019-09-05 ASSESSMENT — PAIN SCALES - GENERAL
PAINLEVEL_OUTOF10: 7
PAINLEVEL_OUTOF10: 4
PAINLEVEL_OUTOF10: 5

## 2019-09-05 ASSESSMENT — PAIN DESCRIPTION - DESCRIPTORS: DESCRIPTORS: SHARP;SHOOTING

## 2019-09-05 NOTE — FLOWSHEET NOTE
Admitted mom and baby to room 5a11 via wheelchair. Fob at moms bedside. Assisted mom to bed with some assistance. Mom complaining of law pain . Report taken from RACHEL Barragan rn . Orientation to room completed with mom and fob. Instructed mom to notify me of need to void times 2 . Mom verbalized understanding . Iv continues to infuse without difficulty . Complete assessment done.

## 2019-09-05 NOTE — FLOWSHEET NOTE
Assisted mom to restroom for first time. Voided large amount clear urine with small amount bleeding noted. No clots with voiding . Law care demo given to mom . Mom redid law care along with spray and tucks . Mom states it hurts. Mom asked if I could call  so she could have something stronger . Acknowledged request. Returned to bed with no difficulty . Fundal care completed . Fundus firm and midline.

## 2019-09-05 NOTE — ANESTHESIA PRE PROCEDURE
Q2H PRN Jossy Schwab MD        oxytocin (PITOCIN) 30 units in 500 mL infusion  1 martir-units/min Intravenous Continuous Jossy Schwab MD 6 mL/hr at 09/05/19 0825 6 martir-units/min at 09/05/19 0825    diphenhydrAMINE (BENADRYL) injection 25 mg  25 mg Intravenous Q4H PRN Jossy Schwab MD        ondansetron [facility]) injection 8 mg  8 mg Intravenous Q6H PRN Jossy Schwab MD        terbutaline (BRETHINE) injection 0.25 mg  0.25 mg Subcutaneous Once Jossy Schwab MD        oxytocin (PITOCIN) 30 units in 500 mL infusion  1 martir-units/min Intravenous Continuous PRN Jossy Schwab MD        methylergonovine (METHERGINE) injection 200 mcg  200 mcg Intramuscular PRN Jossy Schwab MD        misoprostol (CYTOTEC) tablet 1,000 mcg  1,000 mcg Rectal PRN Jossy Schwab MD       [facility] witch hazel-glycerin (TUCKS) pad   Topical PRN Jossy Schwab MD        benzocaine-menthol (DERMOPLAST) 20-0.5 % spray   Topical PRN Jossy Schwab MD        penicillin G potassium IVPB 3 Million Units  3 Million Units Intravenous Q4H Jossy Schwab  mL/hr at 09/05/19 1029 3 Million Units at 09/05/19 1029    ePHEDrine injection 5 mg  5 mg Intravenous PRN Morgan Schooling, DO        fentaNYL 750 mcg, ropivacaine 0.1% in sodium chloride 0.9% 265mL (OB) epidural  18 mL/hr Epidural Continuous [place] Schooling, DO           Allergies:     Allergies   Allergen Reactions    Nsaids Other (See Comments)     Not allowed to take NSAIDS       Problem List:    Patient Active Problem List   Diagnosis Code    Rectal bleeding K62.5    Ulcerative colitis ([facility] [address] K51.90    Shortness of breath R06.02    Fatigue R53.83    Chronic blood loss anemia D50.0    Racing heart beat R00.0    CKD (chronic kidney disease), stage II N18.2    False labor O47.9       Past Medical History:        Diagnosis Date    Anemia     Bruises easily     Histoplasmosis     Mental disorder     anxiety    Ulcerative colitis ([address]        Past Surgical History:        Procedure 26.7 12/22/2011       HCG (If Applicable):   Lab Results   Component Value Date    PREGTESTUR NEGATIVE 09/23/2017    PREGSERUM POSITIVE 01/09/2019        ABGs: No results found for: PHART, PO2ART, GMI9YSM, LZY5VJS, BEART, U1XNEFMC     Type & Screen (If Applicable):  Lab Results   Component Value Date    LABRH POS 09/05/2019       Anesthesia Evaluation  Patient summary reviewed  Airway: Mallampati: III  TM distance: >3 FB     Mouth opening: > = 3 FB Dental:          Pulmonary:                              Cardiovascular:                      Neuro/Psych:   (+) psychiatric history:            GI/Hepatic/Renal:             Endo/Other:                     Abdominal:           Vascular:                                        Anesthesia Plan      epidural     ASA 2             Anesthetic plan and risks discussed with patient. Garcia Hernandez.  [address] [place] [address]   9/5/2019

## 2019-09-05 NOTE — FLOWSHEET NOTE
Pt of Dr. Lorena Chapman arrived to 5C06,  39w0d, for a scheduled induction. Pt denies leaking of fluid and vaginal bleeding. Pt notes occasional ctx. Pt notes fetal movement. Pt oriented to room and call light system. Pt to bathroom to change into gown and to void, informed of maternal drug testing policy in place on all laboring patients. Verbal consent received, paper consent to be signed and urine to be sent.

## 2019-09-05 NOTE — PLAN OF CARE
Problem: Anxiety:  Goal: Level of anxiety will decrease  Description  Level of anxiety will decrease  9/5/2019 0750 by Rick Mantilla RN  Outcome: Ongoing  Note:   Pt remains calm about the birthing experience,  at bedside, supportive. All questions/concerns addressed by RN. Problem: Breathing Pattern - Ineffective:  Goal: Able to breathe comfortably  Description  Able to breathe comfortably  9/5/2019 0750 by Rick Mantilla RN  Outcome: Ongoing  Note:   No signs of resp distress noted. Sp02 remains greater than 92% on room air. Respirations equal and unlabored. Problem: Fluid Volume - Imbalance:  Goal: Absence of intrapartum hemorrhage signs and symptoms  Description  Absence of intrapartum hemorrhage signs and symptoms  9/5/2019 0750 by Rick Mantilla RN  Outcome: Ongoing  Note:   No vaginal bleeding noted, will continue to monitor. Problem: Infection - Intrapartum Infection:  Goal: Will show no infection signs and symptoms  Description  Will show no infection signs and symptoms  9/5/2019 0750 by Rick Mantilla RN  Outcome: Ongoing  Note:   Vitals stable, pt remains afebrile. GBS positive, PCN infusing per order. FHT's remain reassuring, will continue to monitor. Problem: Labor Process - Prolonged:  Goal: Uterine contractions within specified parameters  Description  Uterine contractions within specified parameters  9/5/2019 0750 by Rick Mantilla RN  Outcome: Ongoing  Note:   Pt having irregular, mild contractions. Pitocin increased per order. Problem: Pain - Acute:  Goal: Able to cope with pain  Description  Able to cope with pain  9/5/2019 0750 by Rick Mantilla RN  Outcome: Ongoing  Note:   Pt comfortable at this time but is planning on an epidural as labor progresses. Pain goal 2/10. Repositioned as needed during labor for comfort.       Problem: Tissue Perfusion - Uteroplacental, Altered:  Goal: Absence of abnormal fetal heart rate pattern  Description  Absence of abnormal fetal heart rate pattern  9/5/2019 0750 by Bentley Phoenix, RN  Outcome: Ongoing  Note:   Fetal Heart Tones remain reassuring. Continuous EFM in place. Problem: Urinary Retention:  Goal: Urinary elimination within specified parameters  Description  Urinary elimination within specified parameters  9/5/2019 0750 by Bentley Phoenix, RN  Outcome: Ongoing  Note:   Pt ambulating to bathroom as needed. Problem: Discharge Planning:  Goal: Discharged to appropriate level of care  Description  Discharged to appropriate level of care  9/5/2019 0750 by Bentley Phoenix, RN  Outcome: Ongoing  Note:   Pt aware of 2hr recovery period in L&D and then transfer to mom/baby for the remainder of her stay. Problem: Falls - Risk of:  Goal: Will remain free from falls  Description  Will remain free from falls  Outcome: Ongoing  Note:   Pt. Remains free from falls at this time. IV infusing per order. RN encouraged pt. To call for assistance to BR. Side rails up X2. Call light within reach. S.O. At bedside. RN will continue to provide for a safe environment.

## 2019-09-05 NOTE — H&P
[address] [address]  History and Physical Update    Pt Name: Ingrid Marion  MRN: [id number]  YOB: 1985  Date of evaluation: 2019    [x] I have examined the patient and reviewed the H&P/Consult and there are no changes to the patient or plans. 33yo  at 39wks presented for IOL. CVX is dilated. AROM done. FHT category 1. Titrate pitocin for active labor.      [] I have examined the patient and reviewed the H&P/Consult and have noted the following changes:            Kathleen Troncoso MD  Electronically signed 2019 at 8:48 AM

## 2019-09-05 NOTE — FLOWSHEET NOTE
Dr. Stefano Cole called and notified of pt's arrival to [address] [place] [address], [facility] 39w0d, for a scheduled induction. FHTs reactive, occasional ctx noted, pitocin @ 1 martir-unit/hr, hgb 9.1 this AM, and reviewed SVE. Orders received.

## 2019-09-06 PROCEDURE — 2709999900 HC NON-CHARGEABLE SUPPLY

## 2019-09-06 PROCEDURE — 1200000000 HC SEMI PRIVATE

## 2019-09-06 PROCEDURE — 6370000000 HC RX 637 (ALT 250 FOR IP): Performed by: OBSTETRICS & GYNECOLOGY

## 2019-09-06 RX ORDER — PSEUDOEPHEDRINE HCL 30 MG
100 TABLET ORAL 2 TIMES DAILY PRN
COMMUNITY
Start: 2019-09-06 | End: 2019-09-30 | Stop reason: ALTCHOICE

## 2019-09-06 RX ORDER — HYDROCODONE BITARTRATE AND ACETAMINOPHEN 5; 325 MG/1; MG/1
1 TABLET ORAL EVERY 6 HOURS PRN
Qty: 12 TABLET | Refills: 0 | Status: SHIPPED | OUTPATIENT
Start: 2019-09-06 | End: 2019-09-09

## 2019-09-06 RX ADMIN — HYDROCODONE BITARTRATE AND ACETAMINOPHEN 2 TABLET: 5; 325 TABLET ORAL at 16:14

## 2019-09-06 RX ADMIN — CYCLOBENZAPRINE HYDROCHLORIDE 10 MG: 10 TABLET, FILM COATED ORAL at 02:04

## 2019-09-06 RX ADMIN — CYCLOBENZAPRINE HYDROCHLORIDE 10 MG: 10 TABLET, FILM COATED ORAL at 20:59

## 2019-09-06 RX ADMIN — HYDROCODONE BITARTRATE AND ACETAMINOPHEN 2 TABLET: 5; 325 TABLET ORAL at 03:05

## 2019-09-06 RX ADMIN — DOCUSATE SODIUM 100 MG: 100 CAPSULE, LIQUID FILLED ORAL at 20:59

## 2019-09-06 RX ADMIN — HYDROCODONE BITARTRATE AND ACETAMINOPHEN 2 TABLET: 5; 325 TABLET ORAL at 20:59

## 2019-09-06 RX ADMIN — CYCLOBENZAPRINE HYDROCHLORIDE 10 MG: 10 TABLET, FILM COATED ORAL at 11:33

## 2019-09-06 RX ADMIN — HYDROCODONE BITARTRATE AND ACETAMINOPHEN 2 TABLET: 5; 325 TABLET ORAL at 11:33

## 2019-09-06 RX ADMIN — HYDROCODONE BITARTRATE AND ACETAMINOPHEN 2 TABLET: 5; 325 TABLET ORAL at 07:13

## 2019-09-06 ASSESSMENT — PAIN SCALES - GENERAL
PAINLEVEL_OUTOF10: 7
PAINLEVEL_OUTOF10: 7
PAINLEVEL_OUTOF10: 4
PAINLEVEL_OUTOF10: 7

## 2019-09-06 NOTE — ANESTHESIA POSTPROCEDURE EVALUATION
Department of Anesthesiology  Postprocedure Note    Patient: Heriberto Mae  MRN: [id number]  YOB: 1985  Date of evaluation: 9/6/2019  Time:  11:22 AM     Procedure Summary     Date:  09/05/19 Room / Location:      Anesthesia Start:  6109 Anesthesia Stop:  1699    Procedure:  Labor Analgesia Diagnosis:      Scheduled Providers:   Responsible Provider:  Jacob Jones DO    Anesthesia Type:  epidural ASA Status:  2          Anesthesia Type: epidural    Torey Phase I: Torey Score: 9    Torey Phase II: Torey Score: 10    Last vitals: Reviewed and per EMR flowsheets. Anesthesia Post Evaluation    Patient location during evaluation: floor  Patient participation: complete - patient participated  Level of consciousness: awake  Airway patency: patent  Nausea & Vomiting: no vomiting and no nausea  Complications: no  Cardiovascular status: hemodynamically stable  Respiratory status: acceptable  Hydration status: stable  Comments: Patient says she has difficultly lifting right leg. Has no numbness.

## 2019-09-06 NOTE — LACTATION NOTE
Pt states no questions or concerns at this time. Encouraged Pt to call out for assistance as needed. Will follow up PRN.

## 2019-09-06 NOTE — PLAN OF CARE
Problem: Pain:  Goal: Pain level will decrease  Description  Pain level will decrease  9/6/2019 0937 by Angelita Richardson RN  Outcome: Ongoing  Note:   Prn norco and flexeril given with relief. Pain goal noted to be a 4. Tucks and dermaplast being used. Problem: Discharge Planning:  Goal: Discharged to appropriate level of care  Description  Discharged to appropriate level of care  9/6/2019 0937 by Genoveva Nieves RN  Outcome: Ongoing  Note:   Plan to be discharged home with  tomorrow. Problem: Constipation:  Goal: Bowel elimination is within specified parameters  Description  Bowel elimination is within specified parameters  9/6/2019 0937 by Angelita Richardson RN  Outcome: Ongoing  Note:   Patient passing gas. Problem: Fluid Volume - Imbalance:  Goal: Absence of postpartum hemorrhage signs and symptoms  Description  Absence of postpartum hemorrhage signs and symptoms  9/6/2019 0937 by Angelita Richardson RN  Outcome: Ongoing  Note:   Vaginal bleeding WNL, no clots or foul odors. Problem: Infection - Risk of, Puerperal Infection:  Goal: Will show no infection signs and symptoms  Description  Will show no infection signs and symptoms  9/6/2019 0937 by Angelita Richardson RN  Outcome: Ongoing  Note:   Afebrile this shift. Problem: Mood - Altered:  Goal: Mood stable  Description  Mood stable  9/6/2019 0937 by Angelita Richardson RN  Outcome: Ongoing  Note:   Emotional support provided. Problem: Falls - Risk of:  Goal: Will remain free from falls  Description  Will remain free from falls  9/6/2019 0937 by Angelita Richardson RN  Outcome: Completed   Care plan reviewed with patient and she contributes to goal setting and voices understanding of plan of care.

## 2019-09-07 VITALS
SYSTOLIC BLOOD PRESSURE: 146 MMHG | TEMPERATURE: 98.6 F | OXYGEN SATURATION: 99 % | DIASTOLIC BLOOD PRESSURE: 81 MMHG | RESPIRATION RATE: 18 BRPM | HEIGHT: 65 IN | BODY MASS INDEX: 33.49 KG/M2 | WEIGHT: 201 LBS | HEART RATE: 100 BPM

## 2019-09-07 PROCEDURE — 2709999900 HC NON-CHARGEABLE SUPPLY

## 2019-09-07 PROCEDURE — 97162 PT EVAL MOD COMPLEX 30 MIN: CPT

## 2019-09-07 PROCEDURE — 6370000000 HC RX 637 (ALT 250 FOR IP): Performed by: OBSTETRICS & GYNECOLOGY

## 2019-09-07 PROCEDURE — 97530 THERAPEUTIC ACTIVITIES: CPT

## 2019-09-07 RX ADMIN — HYDROCODONE BITARTRATE AND ACETAMINOPHEN 2 TABLET: 5; 325 TABLET ORAL at 13:55

## 2019-09-07 RX ADMIN — HYDROCODONE BITARTRATE AND ACETAMINOPHEN 2 TABLET: 5; 325 TABLET ORAL at 09:40

## 2019-09-07 RX ADMIN — DOCUSATE SODIUM 100 MG: 100 CAPSULE, LIQUID FILLED ORAL at 09:40

## 2019-09-07 RX ADMIN — HYDROCODONE BITARTRATE AND ACETAMINOPHEN 2 TABLET: 5; 325 TABLET ORAL at 05:31

## 2019-09-07 RX ADMIN — CYCLOBENZAPRINE HYDROCHLORIDE 10 MG: 10 TABLET, FILM COATED ORAL at 13:56

## 2019-09-07 RX ADMIN — CYCLOBENZAPRINE HYDROCHLORIDE 10 MG: 10 TABLET, FILM COATED ORAL at 05:31

## 2019-09-07 RX ADMIN — HYDROCODONE BITARTRATE AND ACETAMINOPHEN 2 TABLET: 5; 325 TABLET ORAL at 01:16

## 2019-09-07 ASSESSMENT — PAIN SCALES - GENERAL
PAINLEVEL_OUTOF10: 6
PAINLEVEL_OUTOF10: 5
PAINLEVEL_OUTOF10: 7

## 2019-09-07 ASSESSMENT — PAIN DESCRIPTION - PAIN TYPE: TYPE: ACUTE PAIN

## 2019-09-07 ASSESSMENT — PAIN DESCRIPTION - LOCATION: LOCATION: GROIN

## 2019-09-07 ASSESSMENT — PAIN DESCRIPTION - ORIENTATION: ORIENTATION: RIGHT

## 2019-09-07 NOTE — PLAN OF CARE
Problem: Pain:  Goal: Pain level will decrease  Description  Pain level will decrease  Outcome: Ongoing  Note:   States some relief with oral pain medication, ice pack applied to lower abdominal muscles, pain goal 4     Problem: Discharge Planning:  Goal: Discharged to appropriate level of care  Description  Discharged to appropriate level of care  Outcome: Ongoing  Note:   Plan is for discharge home today     Problem: Constipation:  Goal: Bowel elimination is within specified parameters  Description  Bowel elimination is within specified parameters  Outcome: Ongoing  Note:   Taking stool softeners     Problem: Fluid Volume - Imbalance:  Goal: Absence of postpartum hemorrhage signs and symptoms  Description  Absence of postpartum hemorrhage signs and symptoms  Outcome: Ongoing  Note:   Scant amount of lochia     Problem: Infection - Risk of, Puerperal Infection:  Goal: Will show no infection signs and symptoms  Description  Will show no infection signs and symptoms  Outcome: Ongoing  Note:   No signs of infection     Problem: Mood - Altered:  Goal: Mood stable  Description  Mood stable  Outcome: Ongoing  Note:   Pleasant and cooperative   Care plan reviewed with patient. Patient verbalizes understanding of the plan of care and contributes to goal setting.

## 2019-09-27 DIAGNOSIS — D50.0 IRON DEFICIENCY ANEMIA DUE TO CHRONIC BLOOD LOSS: Primary | ICD-10-CM

## 2019-09-30 ENCOUNTER — OFFICE VISIT (OUTPATIENT)
Dept: ONCOLOGY | Age: 34
End: 2019-09-30
Payer: COMMERCIAL

## 2019-09-30 ENCOUNTER — HOSPITAL ENCOUNTER (EMERGENCY)
Age: 34
Discharge: HOME | End: 2019-09-30
Attending: EMERGENCY MEDICINE
Payer: COMMERCIAL

## 2019-09-30 ENCOUNTER — APPOINTMENT (OUTPATIENT)
Dept: CT IMAGING | Age: 34
End: 2019-09-30
Payer: COMMERCIAL

## 2019-09-30 ENCOUNTER — HOSPITAL ENCOUNTER (OUTPATIENT)
Dept: INFUSION THERAPY | Age: 34
Discharge: HOME | End: 2019-09-30
Payer: COMMERCIAL

## 2019-09-30 VITALS
DIASTOLIC BLOOD PRESSURE: 72 MMHG | TEMPERATURE: 99.2 F | WEIGHT: 184.8 LBS | HEART RATE: 70 BPM | BODY MASS INDEX: 30.79 KG/M2 | RESPIRATION RATE: 18 BRPM | SYSTOLIC BLOOD PRESSURE: 105 MMHG | HEIGHT: 65 IN | OXYGEN SATURATION: 97 %

## 2019-09-30 VITALS
OXYGEN SATURATION: 96 % | TEMPERATURE: 100.5 F | DIASTOLIC BLOOD PRESSURE: 61 MMHG | HEIGHT: 65 IN | RESPIRATION RATE: 16 BRPM | WEIGHT: 184 LBS | HEART RATE: 96 BPM | BODY MASS INDEX: 30.66 KG/M2 | SYSTOLIC BLOOD PRESSURE: 102 MMHG

## 2019-09-30 DIAGNOSIS — K62.5 RECTAL BLEEDING: ICD-10-CM

## 2019-09-30 DIAGNOSIS — D50.0 IRON DEFICIENCY ANEMIA DUE TO CHRONIC BLOOD LOSS: Primary | ICD-10-CM

## 2019-09-30 DIAGNOSIS — N12 PYELONEPHRITIS: Primary | ICD-10-CM

## 2019-09-30 DIAGNOSIS — K51.011 ULCERATIVE PANCOLITIS WITH RECTAL BLEEDING (HCC): ICD-10-CM

## 2019-09-30 DIAGNOSIS — D50.0 IRON DEFICIENCY ANEMIA DUE TO CHRONIC BLOOD LOSS: ICD-10-CM

## 2019-09-30 LAB
ALBUMIN SERPL-MCNC: 3.5 GM/DL (ref 3.4–5)
ALP BLD-CCNC: 84 U/L (ref 46–116)
ALT SERPL-CCNC: 15 U/L (ref 14–63)
AMORPHOUS: ABNORMAL
ANION GAP: 9 MEQ/L (ref 8–16)
ANISOCYTOSIS: SLIGHT
AST SERPL-CCNC: 15 U/L (ref 15–37)
BACTERIA: ABNORMAL
BASINOPHIL, AUTOMATED: 0 % (ref 0–3)
BASOPHILS # BLD: 0.3 % (ref 0–3)
BILIRUB SERPL-MCNC: 0.2 MG/DL (ref 0.2–1)
BILIRUBIN URINE: NEGATIVE
BLOOD, URINE: ABNORMAL
BUN BLDV-MCNC: 13 MG/DL (ref 7–18)
CASTS UA: ABNORMAL /LPF
CHARACTER, URINE: ABNORMAL
CHLORIDE BLD-SCNC: 103 MEQ/L (ref 98–107)
CO2: 28 MEQ/L (ref 21–32)
COLOR: ABNORMAL
CREAT SERPL-MCNC: 1.3 MG/DL (ref 0.6–1.3)
CRYSTALS, UA: ABNORMAL
EOSINOPHILS RELATIVE PERCENT: 2.8 % (ref 0–4)
EOSINOPHILS RELATIVE PERCENT: 3 % (ref 0–4)
EPITHELIAL CELLS, UA: ABNORMAL /HPF
GFR, ESTIMATED: 50 ML/MIN/1.73M2
GLUCOSE BLD-MCNC: 116 MG/DL (ref 74–106)
GLUCOSE, URINE: NEGATIVE MG/DL
HCT VFR BLD CALC: 32.6 % (ref 37–47)
HCT VFR BLD CALC: 33.9 % (ref 37–47)
HEMOGLOBIN: 10.3 GM/DL (ref 12–16)
HEMOGLOBIN: 10.9 GM/DL (ref 12–16)
HYPOCHROMIA: ABNORMAL
KETONES, URINE: NEGATIVE
LEUKOCYTE ESTERASE, URINE: ABNORMAL
LYMPHOCYTES # BLD: 17 % (ref 15–47)
LYMPHOCYTES # BLD: 9 % (ref 15–47)
MCH RBC QN AUTO: 25.1 PG (ref 27–31)
MCH RBC QN AUTO: 25.7 PG (ref 27–31)
MCHC RBC AUTO-ENTMCNC: 31.6 GM/DL (ref 33–37)
MCHC RBC AUTO-ENTMCNC: 32.1 GM/DL (ref 33–37)
MCV RBC AUTO: 79.6 FL (ref 81–99)
MCV RBC AUTO: 80 FL (ref 81–99)
MONOCYTES: 3.4 % (ref 0–12)
MONOCYTES: 9 % (ref 0–12)
MUCUS: ABNORMAL
NITRITE, URINE: POSITIVE
PDW BLD-RTO: 14.3 % (ref 11.5–14.5)
PDW BLD-RTO: 16.1 % (ref 11.5–14.5)
PH UA: 6 (ref 5–9)
PLATELET # BLD: 416 THOU/MM3 (ref 130–400)
PLATELET # BLD: 471 THOU/MM3 (ref 130–400)
PLATELET ESTIMATE: ABNORMAL
PMV BLD AUTO: 7 FL (ref 7.4–10.4)
PMV BLD AUTO: 7.7 FL (ref 7.4–10.4)
POC CALCIUM: 9.7 MG/DL (ref 8.5–10.1)
POTASSIUM SERPL-SCNC: 4.1 MEQ/L (ref 3.5–5.1)
PROTEIN UA: >= 300 MG/DL
RBC # BLD: 4.09 MILL/MM3 (ref 4.2–5.4)
RBC # BLD: 4.24 MILL/MM3 (ref 4.2–5.4)
RBC # BLD: ABNORMAL 10*6/UL
RBC UA: ABNORMAL /HPF
REFLEX TO URINE C & S: ABNORMAL
SCAN OF BLOOD SMEAR: NORMAL
SEG NEUTROPHILS: 72 % (ref 43–75)
SEGS: 84.5 % (ref 43–75)
SODIUM BLD-SCNC: 140 MEQ/L (ref 136–145)
SPECIFIC GRAVITY UA: 1.02 (ref 1–1.03)
TOTAL PROTEIN: 7.5 GM/DL (ref 6.4–8.2)
UROBILINOGEN, URINE: 0.2 EU/DL (ref 0–1)
WBC # BLD: 10.8 THOU/MM3 (ref 4.8–10.8)
WBC # BLD: 11.9 THOU/MM3 (ref 4.8–10.8)
WBC UA: ABNORMAL /HPF

## 2019-09-30 PROCEDURE — 80053 COMPREHEN METABOLIC PANEL: CPT

## 2019-09-30 PROCEDURE — 85025 COMPLETE CBC W/AUTO DIFF WBC: CPT

## 2019-09-30 PROCEDURE — 96365 THER/PROPH/DIAG IV INF INIT: CPT

## 2019-09-30 PROCEDURE — 87086 URINE CULTURE/COLONY COUNT: CPT

## 2019-09-30 PROCEDURE — 6360000002 HC RX W HCPCS: Performed by: EMERGENCY MEDICINE

## 2019-09-30 PROCEDURE — 87186 SC STD MICRODIL/AGAR DIL: CPT

## 2019-09-30 PROCEDURE — 74176 CT ABD & PELVIS W/O CONTRAST: CPT

## 2019-09-30 PROCEDURE — 99211 OFF/OP EST MAY X REQ PHY/QHP: CPT

## 2019-09-30 PROCEDURE — 87040 BLOOD CULTURE FOR BACTERIA: CPT

## 2019-09-30 PROCEDURE — 96375 TX/PRO/DX INJ NEW DRUG ADDON: CPT

## 2019-09-30 PROCEDURE — 2709999900 HC NON-CHARGEABLE SUPPLY

## 2019-09-30 PROCEDURE — 2580000003 HC RX 258: Performed by: EMERGENCY MEDICINE

## 2019-09-30 PROCEDURE — 87077 CULTURE AEROBIC IDENTIFY: CPT

## 2019-09-30 PROCEDURE — 99213 OFFICE O/P EST LOW 20 MIN: CPT | Performed by: INTERNAL MEDICINE

## 2019-09-30 PROCEDURE — 99284 EMERGENCY DEPT VISIT MOD MDM: CPT

## 2019-09-30 PROCEDURE — 81001 URINALYSIS AUTO W/SCOPE: CPT

## 2019-09-30 PROCEDURE — 36415 COLL VENOUS BLD VENIPUNCTURE: CPT

## 2019-09-30 RX ORDER — ONDANSETRON 2 MG/ML
4 INJECTION INTRAMUSCULAR; INTRAVENOUS ONCE
Status: COMPLETED | OUTPATIENT
Start: 2019-09-30 | End: 2019-09-30

## 2019-09-30 RX ORDER — ACETAMINOPHEN 500 MG
1000 TABLET ORAL EVERY 6 HOURS PRN
COMMUNITY

## 2019-09-30 RX ORDER — TRAMADOL HYDROCHLORIDE 50 MG/1
50 TABLET ORAL EVERY 6 HOURS PRN
COMMUNITY
End: 2022-01-03 | Stop reason: SDUPTHER

## 2019-09-30 RX ORDER — KETOROLAC TROMETHAMINE 30 MG/ML
30 INJECTION, SOLUTION INTRAMUSCULAR; INTRAVENOUS ONCE
Status: COMPLETED | OUTPATIENT
Start: 2019-09-30 | End: 2019-09-30

## 2019-09-30 RX ORDER — HYDROCODONE BITARTRATE AND ACETAMINOPHEN 5; 325 MG/1; MG/1
1 TABLET ORAL EVERY 6 HOURS PRN
Qty: 12 TABLET | Refills: 0 | Status: SHIPPED | OUTPATIENT
Start: 2019-09-30 | End: 2019-10-03

## 2019-09-30 RX ORDER — 0.9 % SODIUM CHLORIDE 0.9 %
1000 INTRAVENOUS SOLUTION INTRAVENOUS ONCE
Status: COMPLETED | OUTPATIENT
Start: 2019-09-30 | End: 2019-09-30

## 2019-09-30 RX ADMIN — SODIUM CHLORIDE 1000 ML: 9 INJECTION, SOLUTION INTRAVENOUS at 17:38

## 2019-09-30 RX ADMIN — CEFTRIAXONE SODIUM 1 G: 1 INJECTION, POWDER, FOR SOLUTION INTRAMUSCULAR; INTRAVENOUS at 17:56

## 2019-09-30 RX ADMIN — ONDANSETRON 4 MG: 2 INJECTION INTRAMUSCULAR; INTRAVENOUS at 17:44

## 2019-09-30 RX ADMIN — KETOROLAC TROMETHAMINE 30 MG: 30 INJECTION, SOLUTION INTRAMUSCULAR at 17:42

## 2019-09-30 ASSESSMENT — ENCOUNTER SYMPTOMS
COUGH: 0
SHORTNESS OF BREATH: 0
NAUSEA: 1
SORE THROAT: 0
BACK PAIN: 1
WHEEZING: 0
ABDOMINAL PAIN: 0
VOMITING: 0

## 2019-09-30 ASSESSMENT — PAIN SCALES - GENERAL
PAINLEVEL_OUTOF10: 5
PAINLEVEL_OUTOF10: 2

## 2019-09-30 ASSESSMENT — PAIN DESCRIPTION - DESCRIPTORS
DESCRIPTORS: ACHING
DESCRIPTORS: ACHING

## 2019-09-30 ASSESSMENT — PAIN DESCRIPTION - ORIENTATION
ORIENTATION: LEFT
ORIENTATION: LEFT

## 2019-09-30 ASSESSMENT — PAIN DESCRIPTION - FREQUENCY
FREQUENCY: CONTINUOUS
FREQUENCY: CONTINUOUS

## 2019-09-30 ASSESSMENT — PAIN DESCRIPTION - LOCATION
LOCATION: FLANK
LOCATION: FLANK

## 2019-09-30 ASSESSMENT — PAIN DESCRIPTION - PAIN TYPE
TYPE: ACUTE PAIN
TYPE: ACUTE PAIN

## 2019-10-02 LAB
ORGANISM: ABNORMAL
URINE CULTURE REFLEX: ABNORMAL

## 2019-10-06 LAB — BLOOD CULTURE, ROUTINE: NORMAL

## 2019-10-06 ASSESSMENT — ENCOUNTER SYMPTOMS
SORE THROAT: 0
COLOR CHANGE: 0
ABDOMINAL PAIN: 1
RECTAL PAIN: 0
FACIAL SWELLING: 0
ABDOMINAL DISTENTION: 0
CONSTIPATION: 1
EYE DISCHARGE: 0
NAUSEA: 0
BACK PAIN: 0
BLOOD IN STOOL: 1
VOMITING: 0
DIARRHEA: 1
WHEEZING: 0
COUGH: 0
TROUBLE SWALLOWING: 0
CHEST TIGHTNESS: 1
SHORTNESS OF BREATH: 1

## 2019-12-27 DIAGNOSIS — D50.0 IRON DEFICIENCY ANEMIA DUE TO CHRONIC BLOOD LOSS: Primary | ICD-10-CM

## 2019-12-27 PROBLEM — J80 ARDS (ADULT RESPIRATORY DISTRESS SYNDROME) (HCC): Status: ACTIVE | Noted: 2019-12-27

## 2019-12-27 PROBLEM — D50.9 IRON DEFICIENCY ANEMIA: Status: ACTIVE | Noted: 2017-09-30

## 2019-12-27 PROBLEM — R74.01 TRANSAMINITIS: Status: ACTIVE | Noted: 2019-12-27

## 2019-12-27 PROBLEM — R65.21 SEPTIC SHOCK (HCC): Status: ACTIVE | Noted: 2019-12-27

## 2019-12-27 PROBLEM — Z86.19 HISTORY OF HISTOPLASMOSIS: Status: ACTIVE | Noted: 2019-12-27

## 2019-12-27 PROBLEM — K90.9 IRON MALABSORPTION: Status: ACTIVE | Noted: 2017-12-21

## 2019-12-27 PROBLEM — A41.9 SEPTIC SHOCK (HCC): Status: ACTIVE | Noted: 2019-12-27

## 2019-12-27 PROBLEM — E46 MALNUTRITION (HCC): Status: ACTIVE | Noted: 2019-12-27

## 2019-12-27 PROBLEM — J96.20 ACUTE AND CHRONIC RESPIRATORY FAILURE (ACUTE-ON-CHRONIC) (HCC): Status: ACTIVE | Noted: 2019-12-27

## 2019-12-27 PROBLEM — B39.9 DISSEMINATED HISTOPLASMOSIS: Status: ACTIVE | Noted: 2019-12-27

## 2019-12-27 PROBLEM — B39.2: Status: ACTIVE | Noted: 2019-12-27

## 2019-12-27 PROBLEM — K52.9 INFLAMMATORY BOWEL DISEASE: Status: ACTIVE | Noted: 2017-09-22

## 2019-12-27 PROBLEM — K92.2 GI BLEED: Status: ACTIVE | Noted: 2017-09-30

## 2019-12-30 ENCOUNTER — OFFICE VISIT (OUTPATIENT)
Dept: ONCOLOGY | Age: 34
End: 2019-12-30
Payer: COMMERCIAL

## 2019-12-30 ENCOUNTER — HOSPITAL ENCOUNTER (OUTPATIENT)
Dept: INFUSION THERAPY | Age: 34
Discharge: HOME | End: 2019-12-30
Payer: COMMERCIAL

## 2019-12-30 VITALS
DIASTOLIC BLOOD PRESSURE: 82 MMHG | RESPIRATION RATE: 18 BRPM | OXYGEN SATURATION: 96 % | BODY MASS INDEX: 30.62 KG/M2 | HEART RATE: 102 BPM | HEIGHT: 65 IN | TEMPERATURE: 98.2 F | SYSTOLIC BLOOD PRESSURE: 132 MMHG

## 2019-12-30 DIAGNOSIS — D50.0 IRON DEFICIENCY ANEMIA DUE TO CHRONIC BLOOD LOSS: ICD-10-CM

## 2019-12-30 DIAGNOSIS — K51.811 OTHER ULCERATIVE COLITIS WITH RECTAL BLEEDING (HCC): Primary | ICD-10-CM

## 2019-12-30 LAB
BASOPHILS # BLD: 0.7 %
BASOPHILS ABSOLUTE: 0.1 THOU/MM3 (ref 0–0.1)
EOSINOPHIL # BLD: 5.7 %
EOSINOPHILS ABSOLUTE: 0.4 THOU/MM3 (ref 0–0.4)
HCT VFR BLD CALC: 26.6 % (ref 37–47)
HEMOGLOBIN: 8.6 GM/DL (ref 12–16)
IMMATURE GRANS (ABS): 0.04 THOU/MM3 (ref 0–0.07)
IMMATURE GRANULOCYTES: 0.5 %
LYMPHOCYTES # BLD: 33.2 %
LYMPHOCYTES ABSOLUTE: 2.5 THOU/MM3 (ref 1–4.8)
MCH RBC QN AUTO: 22 PG (ref 27–31)
MCHC RBC AUTO-ENTMCNC: 32.5 GM/DL (ref 33–37)
MCV RBC AUTO: 68 FL (ref 81–99)
MONOCYTES # BLD: 8.8 %
MONOCYTES ABSOLUTE: 0.7 THOU/MM3 (ref 0.4–1.3)
NUCLEATED RED BLOOD CELLS: 0 /100 WBC
PDW BLD-RTO: 14.3 % (ref 11.5–14.5)
PLATELET # BLD: 505 THOU/MM3 (ref 130–400)
PMV BLD AUTO: 7.4 FL (ref 7.4–10.4)
RBC # BLD: 3.93 MILL/MM3 (ref 4.2–5.4)
SEG NEUTROPHILS: 51.1 %
SEGMENTED NEUTROPHILS ABSOLUTE COUNT: 3.8 THOU/MM3 (ref 1.8–7.7)
WBC # BLD: 7.4 THOU/MM3 (ref 4.8–10.8)

## 2019-12-30 PROCEDURE — 99214 OFFICE O/P EST MOD 30 MIN: CPT | Performed by: PHYSICIAN ASSISTANT

## 2019-12-30 PROCEDURE — 99211 OFF/OP EST MAY X REQ PHY/QHP: CPT

## 2019-12-30 PROCEDURE — 85025 COMPLETE CBC W/AUTO DIFF WBC: CPT

## 2019-12-30 PROCEDURE — 36415 COLL VENOUS BLD VENIPUNCTURE: CPT

## 2019-12-30 RX ORDER — SODIUM CHLORIDE 9 MG/ML
INJECTION, SOLUTION INTRAVENOUS CONTINUOUS
Status: CANCELLED | OUTPATIENT
Start: 2020-01-03

## 2019-12-30 RX ORDER — DIPHENHYDRAMINE HYDROCHLORIDE 50 MG/ML
50 INJECTION INTRAMUSCULAR; INTRAVENOUS ONCE
Status: CANCELLED | OUTPATIENT
Start: 2020-01-03

## 2019-12-30 RX ORDER — ETONOGESTREL/ETHINYL ESTRADIOL .12-.015MG
RING, VAGINAL VAGINAL
COMMUNITY
Start: 2019-10-23 | End: 2022-01-27

## 2019-12-30 RX ORDER — HEPARIN SODIUM (PORCINE) LOCK FLUSH IV SOLN 100 UNIT/ML 100 UNIT/ML
500 SOLUTION INTRAVENOUS PRN
Status: CANCELLED | OUTPATIENT
Start: 2020-01-03

## 2019-12-30 RX ORDER — EPINEPHRINE 1 MG/ML
0.3 INJECTION, SOLUTION, CONCENTRATE INTRAVENOUS PRN
Status: CANCELLED | OUTPATIENT
Start: 2020-01-03

## 2019-12-30 RX ORDER — SODIUM CHLORIDE 0.9 % (FLUSH) 0.9 %
10 SYRINGE (ML) INJECTION PRN
Status: CANCELLED | OUTPATIENT
Start: 2020-01-03

## 2019-12-30 RX ORDER — SODIUM CHLORIDE 0.9 % (FLUSH) 0.9 %
5 SYRINGE (ML) INJECTION PRN
Status: CANCELLED | OUTPATIENT
Start: 2020-01-03

## 2019-12-30 RX ORDER — MEPERIDINE HYDROCHLORIDE 50 MG/ML
12.5 INJECTION INTRAMUSCULAR; INTRAVENOUS; SUBCUTANEOUS ONCE
Status: CANCELLED | OUTPATIENT
Start: 2020-01-03

## 2019-12-30 RX ORDER — METHYLPREDNISOLONE SODIUM SUCCINATE 125 MG/2ML
125 INJECTION, POWDER, LYOPHILIZED, FOR SOLUTION INTRAMUSCULAR; INTRAVENOUS ONCE
Status: CANCELLED | OUTPATIENT
Start: 2020-01-03

## 2020-01-10 ENCOUNTER — HOSPITAL ENCOUNTER (OUTPATIENT)
Dept: INFUSION THERAPY | Age: 35
Discharge: HOME | End: 2020-01-10
Payer: COMMERCIAL

## 2020-01-10 ENCOUNTER — CLINICAL DOCUMENTATION (OUTPATIENT)
Dept: SPIRITUAL SERVICES | Facility: CLINIC | Age: 35
End: 2020-01-10

## 2020-01-10 VITALS
BODY MASS INDEX: 30.62 KG/M2 | TEMPERATURE: 98.5 F | HEART RATE: 83 BPM | RESPIRATION RATE: 18 BRPM | OXYGEN SATURATION: 98 % | HEIGHT: 65 IN | DIASTOLIC BLOOD PRESSURE: 62 MMHG | SYSTOLIC BLOOD PRESSURE: 104 MMHG

## 2020-01-10 DIAGNOSIS — K51.011 ULCERATIVE PANCOLITIS WITH RECTAL BLEEDING (HCC): ICD-10-CM

## 2020-01-10 DIAGNOSIS — D50.0 CHRONIC BLOOD LOSS ANEMIA: ICD-10-CM

## 2020-01-10 DIAGNOSIS — D50.0 IRON DEFICIENCY ANEMIA DUE TO CHRONIC BLOOD LOSS: Primary | ICD-10-CM

## 2020-01-10 DIAGNOSIS — R53.83 OTHER FATIGUE: ICD-10-CM

## 2020-01-10 DIAGNOSIS — R06.02 SHORTNESS OF BREATH: ICD-10-CM

## 2020-01-10 LAB
ABO: NORMAL
ANTIBODY SCREEN: NORMAL
HCT VFR BLD CALC: 22.1 % (ref 37–47)
HEMOGLOBIN: 6.9 GM/DL (ref 12–16)
MCH RBC QN AUTO: 21.1 PG (ref 27–31)
MCHC RBC AUTO-ENTMCNC: 31 GM/DL (ref 33–37)
MCV RBC AUTO: 68 FL (ref 81–99)
PDW BLD-RTO: 16.8 % (ref 11.5–14.5)
PLATELET # BLD: 576 THOU/MM3 (ref 130–400)
PMV BLD AUTO: 7.8 FL (ref 7.4–10.4)
RBC # BLD: 3.26 MILL/MM3 (ref 4.2–5.4)
RH FACTOR: NORMAL
WBC # BLD: 8.5 THOU/MM3 (ref 4.8–10.8)

## 2020-01-10 PROCEDURE — 36592 COLLECT BLOOD FROM PICC: CPT

## 2020-01-10 PROCEDURE — 36430 TRANSFUSION BLD/BLD COMPNT: CPT

## 2020-01-10 PROCEDURE — P9016 RBC LEUKOCYTES REDUCED: HCPCS

## 2020-01-10 PROCEDURE — 86901 BLOOD TYPING SEROLOGIC RH(D): CPT

## 2020-01-10 PROCEDURE — 96365 THER/PROPH/DIAG IV INF INIT: CPT

## 2020-01-10 PROCEDURE — 2580000003 HC RX 258: Performed by: PHYSICIAN ASSISTANT

## 2020-01-10 PROCEDURE — 85027 COMPLETE CBC AUTOMATED: CPT

## 2020-01-10 PROCEDURE — 86900 BLOOD TYPING SEROLOGIC ABO: CPT

## 2020-01-10 PROCEDURE — 96366 THER/PROPH/DIAG IV INF ADDON: CPT

## 2020-01-10 PROCEDURE — 86850 RBC ANTIBODY SCREEN: CPT

## 2020-01-10 PROCEDURE — 2580000003 HC RX 258: Performed by: NURSE PRACTITIONER

## 2020-01-10 PROCEDURE — 36415 COLL VENOUS BLD VENIPUNCTURE: CPT

## 2020-01-10 PROCEDURE — 6360000002 HC RX W HCPCS: Performed by: PHYSICIAN ASSISTANT

## 2020-01-10 PROCEDURE — 86923 COMPATIBILITY TEST ELECTRIC: CPT

## 2020-01-10 PROCEDURE — 99211 OFF/OP EST MAY X REQ PHY/QHP: CPT

## 2020-01-10 RX ORDER — SODIUM CHLORIDE 0.9 % (FLUSH) 0.9 %
20 SYRINGE (ML) INJECTION PRN
Status: DISCONTINUED | OUTPATIENT
Start: 2020-01-10 | End: 2020-01-11 | Stop reason: HOSPADM

## 2020-01-10 RX ORDER — SODIUM CHLORIDE 0.9 % (FLUSH) 0.9 %
20 SYRINGE (ML) INJECTION PRN
Status: CANCELLED | OUTPATIENT
Start: 2020-01-10

## 2020-01-10 RX ORDER — SODIUM CHLORIDE 0.9 % (FLUSH) 0.9 %
5 SYRINGE (ML) INJECTION PRN
Status: CANCELLED | OUTPATIENT
Start: 2020-01-17

## 2020-01-10 RX ORDER — SODIUM CHLORIDE 0.9 % (FLUSH) 0.9 %
10 SYRINGE (ML) INJECTION PRN
Status: CANCELLED | OUTPATIENT
Start: 2020-01-17

## 2020-01-10 RX ORDER — DIPHENHYDRAMINE HYDROCHLORIDE 50 MG/ML
50 INJECTION INTRAMUSCULAR; INTRAVENOUS ONCE
Status: CANCELLED | OUTPATIENT
Start: 2020-01-17

## 2020-01-10 RX ORDER — HEPARIN SODIUM (PORCINE) LOCK FLUSH IV SOLN 100 UNIT/ML 100 UNIT/ML
500 SOLUTION INTRAVENOUS PRN
Status: CANCELLED | OUTPATIENT
Start: 2020-01-17

## 2020-01-10 RX ORDER — METHYLPREDNISOLONE SODIUM SUCCINATE 125 MG/2ML
125 INJECTION, POWDER, LYOPHILIZED, FOR SOLUTION INTRAMUSCULAR; INTRAVENOUS ONCE
Status: CANCELLED | OUTPATIENT
Start: 2020-01-17

## 2020-01-10 RX ORDER — MEPERIDINE HYDROCHLORIDE 25 MG/ML
12.5 INJECTION INTRAMUSCULAR; INTRAVENOUS; SUBCUTANEOUS ONCE
Status: CANCELLED | OUTPATIENT
Start: 2020-01-17

## 2020-01-10 RX ORDER — 0.9 % SODIUM CHLORIDE 0.9 %
250 INTRAVENOUS SOLUTION INTRAVENOUS ONCE
Status: COMPLETED | OUTPATIENT
Start: 2020-01-10 | End: 2020-01-10

## 2020-01-10 RX ORDER — SODIUM CHLORIDE 9 MG/ML
INJECTION, SOLUTION INTRAVENOUS CONTINUOUS
Status: CANCELLED | OUTPATIENT
Start: 2020-01-17

## 2020-01-10 RX ORDER — 0.9 % SODIUM CHLORIDE 0.9 %
250 INTRAVENOUS SOLUTION INTRAVENOUS ONCE
Status: CANCELLED | OUTPATIENT
Start: 2020-01-10

## 2020-01-10 RX ORDER — SODIUM CHLORIDE 9 MG/ML
INJECTION, SOLUTION INTRAVENOUS CONTINUOUS
Status: ACTIVE | OUTPATIENT
Start: 2020-01-10 | End: 2020-01-10

## 2020-01-10 RX ADMIN — IRON SUCROSE 300 MG: 20 INJECTION, SOLUTION INTRAVENOUS at 09:12

## 2020-01-10 RX ADMIN — SODIUM CHLORIDE 250 ML: 9 INJECTION, SOLUTION INTRAVENOUS at 10:59

## 2020-01-10 RX ADMIN — SODIUM CHLORIDE: 9 INJECTION, SOLUTION INTRAVENOUS at 08:35

## 2020-01-10 NOTE — PROGRESS NOTES
Patient verbalize understanding to  verbal and written information given  on blood transfusion,procedure ,and possible reaction.  Consent obtained

## 2020-01-10 NOTE — PLAN OF CARE
Patient here for infectofer but complaining of increased fatigue, dizziness, ice craving. Got CBC and hgb 6.9. Will transfuse with 1 unit PRBC. She denies any increased bleeding. Does have bleeding hemorrhoids. She is planning to see Dr. William Mg to follow up on her hx of GI bleed due to ulcerative colitis. Discussed blood transfusion with patient and answered all her questions. Will order CBC for when she comes in next week for infusion.

## 2020-01-10 NOTE — PROGRESS NOTES
share her story and feelings. She expressed with an upbeat spirit \"doing okay,\" and shared her presence due to anemia which she has dealt with for several years. She added the need to come periodically for iron and blood. Objective:       Calm   [] Approachable   [x] Grieving   []   Anxious   [] Angry   [] Loneliness   []   Hopeless   [] Coping   [x] Despair   []   Passive   [] Tearful   [] Fearful  []    Hopeful  [x]  Peaceful   [] Sleeping  []    Guilt   [] Other   []    Unable to Respond  []         Assessment:     Mariya freely engaged in conversation sharing about her family life (4 children: 3 daughters / 1 son who is 4 months) [place] and enjoying the [place] setting. She shared her  is a local law enforcement agent in their community. She is supported by her , parents, as well as other family members living in the area. She stated meeting one another in school at [address] She is active in the [address] [place] [address] tradition. She is accepting of her situation, and finds it \"doable,\" realizing others have it worse. She needs to be replenished with iron and blood every few months. She shared her father just completed cancer treatments, and is doing well at this time. She expressed \"no needs\" for additional spiritual care at this time. Plan:        provided a sustaining listening presence for her to share, and hope was nurtured through appropriate words shared during the encounter. She was grateful for the encounter and ministry received.  remains available for further emotional and spiritual support as needed while rounding, or as referred for spiritual care.

## 2020-01-10 NOTE — PLAN OF CARE
Care plan reviewed with patient. Patient  verbalized understanding of the plan of care and contribute to goal setting. Problem: Intellectual/Education/Knowledge Deficit  Goal: Teaching initiated upon admission  Outcome: Met This Shift  Note:   Patient educated blood product transfusion protocol:  Patient verbalizes understanding to verbal information given on venofer,action and possible side effects. Aware to call MD if develop complications. Patient receiving blood:      - Blood product transfusion information sheet given: questions answered and consent signed  - Take vital signs/ monitor lungs sound prior to transfusion  - Monitor patient for 15 minutes after transfusion started  - Take vital signs / monitor lungs sound in 15 minutes and post transfusion  - Assess IV site   - Monitor patient closely for potential transfusion reaction    Call MD if develop complications once discharged. Intervention: Verbal/written education provided  Note:   Patient verbalize understanding to  verbal and written information given  on Venofer and blood transfusion,procedure ,and possible reaction. Instructed to call MD if develop any complications once discharged. Problem: Discharge Planning  Goal: Knowledge of discharge instructions  Description  Knowledge of discharge instructions     Outcome: Met This Shift  Note:   Verbalized understanding of discharge instructions, follow ups and when to call doctor   Intervention: Discharge to appropriate level of care  Note:   Discuss discharge instructions, follow ups and when to call doctor. Problem: Falls - Risk of:  Goal: Will remain free from falls  Description  Will remain free from falls  Outcome: Met This Shift  Note:   Free from falls while in infusion center.  Verbalized understanding of fall prevention and to ask for assistance with ambulation   Intervention: Assess risk factors for falls  Description  Assess risk factors for falls  Note:   Assess for fall risk, instruct to ask for assistance with ambulation

## 2020-01-13 ENCOUNTER — TELEPHONE (OUTPATIENT)
Dept: ONCOLOGY | Age: 35
End: 2020-01-13

## 2020-01-17 ENCOUNTER — HOSPITAL ENCOUNTER (OUTPATIENT)
Dept: INFUSION THERAPY | Age: 35
Discharge: HOME | End: 2020-01-17
Payer: COMMERCIAL

## 2020-01-17 VITALS
TEMPERATURE: 98.1 F | SYSTOLIC BLOOD PRESSURE: 109 MMHG | OXYGEN SATURATION: 98 % | DIASTOLIC BLOOD PRESSURE: 64 MMHG | WEIGHT: 173.4 LBS | RESPIRATION RATE: 16 BRPM | BODY MASS INDEX: 28.89 KG/M2 | HEIGHT: 65 IN | HEART RATE: 84 BPM

## 2020-01-17 DIAGNOSIS — K51.011 ULCERATIVE PANCOLITIS WITH RECTAL BLEEDING (HCC): ICD-10-CM

## 2020-01-17 DIAGNOSIS — R06.02 SHORTNESS OF BREATH: ICD-10-CM

## 2020-01-17 DIAGNOSIS — D50.0 CHRONIC BLOOD LOSS ANEMIA: ICD-10-CM

## 2020-01-17 DIAGNOSIS — R53.83 OTHER FATIGUE: ICD-10-CM

## 2020-01-17 DIAGNOSIS — D50.0 IRON DEFICIENCY ANEMIA DUE TO CHRONIC BLOOD LOSS: Primary | ICD-10-CM

## 2020-01-17 LAB
BASINOPHIL, AUTOMATED: 0 % (ref 0–3)
BASOPHILS # BLD: 1 %
BASOPHILS ABSOLUTE: 0.1 THOU/MM3 (ref 0–0.1)
EOSINOPHIL # BLD: 9 %
EOSINOPHILS ABSOLUTE: 0.5 THOU/MM3 (ref 0–0.4)
HCT VFR BLD CALC: 31.1 % (ref 37–47)
HEMOGLOBIN: 9.5 GM/DL (ref 12–16)
IMMATURE GRANS (ABS): 0.01 THOU/MM3 (ref 0–0.07)
IMMATURE GRANULOCYTES: 0.2 %
LYMPHOCYTES # BLD: 30.3 %
LYMPHOCYTES ABSOLUTE: 1.5 THOU/MM3 (ref 1–4.8)
MCH RBC QN AUTO: 22.2 PG (ref 27–31)
MCHC RBC AUTO-ENTMCNC: 30.5 GM/DL (ref 33–37)
MCV RBC AUTO: 73 FL (ref 81–99)
MONOCYTES # BLD: 10.4 %
MONOCYTES ABSOLUTE: 0.5 THOU/MM3 (ref 0.4–1.3)
NUCLEATED RED BLOOD CELLS: 0 /100 WBC
PDW BLD-RTO: 19.9 % (ref 11.5–14.5)
PLATELET # BLD: 482 THOU/MM3 (ref 130–400)
PMV BLD AUTO: 7.6 FL (ref 7.4–10.4)
RBC # BLD: 4.28 MILL/MM3 (ref 4.2–5.4)
SEG NEUTROPHILS: 49.1 %
SEGMENTED NEUTROPHILS ABSOLUTE COUNT: 2.5 THOU/MM3 (ref 1.8–7.7)
WBC # BLD: 5 THOU/MM3 (ref 4.8–10.8)

## 2020-01-17 PROCEDURE — 99211 OFF/OP EST MAY X REQ PHY/QHP: CPT

## 2020-01-17 PROCEDURE — 36592 COLLECT BLOOD FROM PICC: CPT

## 2020-01-17 PROCEDURE — 96365 THER/PROPH/DIAG IV INF INIT: CPT

## 2020-01-17 PROCEDURE — 96366 THER/PROPH/DIAG IV INF ADDON: CPT

## 2020-01-17 PROCEDURE — 36415 COLL VENOUS BLD VENIPUNCTURE: CPT

## 2020-01-17 PROCEDURE — 2580000003 HC RX 258: Performed by: PHYSICIAN ASSISTANT

## 2020-01-17 PROCEDURE — 6360000002 HC RX W HCPCS: Performed by: PHYSICIAN ASSISTANT

## 2020-01-17 PROCEDURE — 85025 COMPLETE CBC W/AUTO DIFF WBC: CPT

## 2020-01-17 RX ORDER — SODIUM CHLORIDE 9 MG/ML
INJECTION, SOLUTION INTRAVENOUS CONTINUOUS
Status: ACTIVE | OUTPATIENT
Start: 2020-01-17 | End: 2020-01-17

## 2020-01-17 RX ORDER — SODIUM CHLORIDE 0.9 % (FLUSH) 0.9 %
5 SYRINGE (ML) INJECTION PRN
Status: CANCELLED | OUTPATIENT
Start: 2020-01-24

## 2020-01-17 RX ORDER — SODIUM CHLORIDE 9 MG/ML
INJECTION, SOLUTION INTRAVENOUS CONTINUOUS
Status: CANCELLED | OUTPATIENT
Start: 2020-01-24

## 2020-01-17 RX ORDER — MEPERIDINE HYDROCHLORIDE 25 MG/ML
12.5 INJECTION INTRAMUSCULAR; INTRAVENOUS; SUBCUTANEOUS ONCE
Status: CANCELLED | OUTPATIENT
Start: 2020-01-24

## 2020-01-17 RX ORDER — METHYLPREDNISOLONE SODIUM SUCCINATE 125 MG/2ML
125 INJECTION, POWDER, LYOPHILIZED, FOR SOLUTION INTRAMUSCULAR; INTRAVENOUS ONCE
Status: CANCELLED | OUTPATIENT
Start: 2020-01-24

## 2020-01-17 RX ORDER — HEPARIN SODIUM (PORCINE) LOCK FLUSH IV SOLN 100 UNIT/ML 100 UNIT/ML
500 SOLUTION INTRAVENOUS PRN
Status: CANCELLED | OUTPATIENT
Start: 2020-01-24

## 2020-01-17 RX ORDER — SODIUM CHLORIDE 0.9 % (FLUSH) 0.9 %
10 SYRINGE (ML) INJECTION PRN
Status: CANCELLED | OUTPATIENT
Start: 2020-01-24

## 2020-01-17 RX ORDER — DIPHENHYDRAMINE HYDROCHLORIDE 50 MG/ML
50 INJECTION INTRAMUSCULAR; INTRAVENOUS ONCE
Status: CANCELLED | OUTPATIENT
Start: 2020-01-24

## 2020-01-17 RX ADMIN — IRON SUCROSE 300 MG: 20 INJECTION, SOLUTION INTRAVENOUS at 08:38

## 2020-01-17 RX ADMIN — SODIUM CHLORIDE: 9 INJECTION, SOLUTION INTRAVENOUS at 08:30

## 2020-01-17 NOTE — PROGRESS NOTES
Patient assessed for the following post venofer:    Dizziness   No  Lightheadedness  No      Acute nausea/vomiting No  Headache   No  Chest pain/pressure  No  Rash/itching   No  Shortness of breath  No    Patient kept for 20 minutes observation post infusion venofer. Patient tolerated venofer without any complications. Last vital signs:   /64   Pulse 84   Temp 98.1 °F (36.7 °C) (Oral)   Resp 16   Ht 5' 5\" (1.651 m)   Wt 173 lb 6.4 oz (78.7 kg)   SpO2 98%   BMI 28.86 kg/m²     Patient instructed if experience any of the above symptoms following today's infusion,she is to notify MD immediately or go to the emergency department. Discharge instructions given to patient. Verbalizes understanding. Ambulated off unit per self with belongings.

## 2020-01-17 NOTE — PLAN OF CARE
Problem: Musculor/Skeletal Functional Status  Goal: Absence of falls  Outcome: Met This Shift  Note:   Patient verbalizes understanding of fall precautions. Patient free from falls this visit. Intervention: Fall precautions  Note:   Discussed fall precautions, call light within reach. Problem: Intellectual/Education/Knowledge Deficit  Goal: Teaching initiated upon admission  Outcome: Met This Shift  Note:   Patient verbalizes understanding to verbal information given on venofer,action and possible side effects. Aware to call MD if develop complications. Intervention: Verbal/written education provided  Note:   Discussed venofer action, possible side effects and when to call the doctor. Problem: Discharge Planning  Goal: Knowledge of discharge instructions  Description  Knowledge of discharge instructions     Outcome: Met This Shift  Note:   Verbalized understanding of discharge instructions, follow-up appointments, and when to call the physician. Intervention: Discharge to appropriate level of care  Note:   Discuss discharge instructions, follow ups, and when to call the doctor. Care plan reviewed with patient. Patient verbalizes understanding of the plan of care and contribute to goal setting.

## 2020-01-24 ENCOUNTER — HOSPITAL ENCOUNTER (OUTPATIENT)
Dept: INFUSION THERAPY | Age: 35
Discharge: HOME | End: 2020-01-24
Payer: COMMERCIAL

## 2020-01-24 VITALS
HEIGHT: 65 IN | RESPIRATION RATE: 18 BRPM | OXYGEN SATURATION: 95 % | BODY MASS INDEX: 28.66 KG/M2 | TEMPERATURE: 98.4 F | DIASTOLIC BLOOD PRESSURE: 57 MMHG | HEART RATE: 74 BPM | WEIGHT: 172 LBS | SYSTOLIC BLOOD PRESSURE: 90 MMHG

## 2020-01-24 DIAGNOSIS — D50.0 CHRONIC BLOOD LOSS ANEMIA: ICD-10-CM

## 2020-01-24 DIAGNOSIS — D50.0 IRON DEFICIENCY ANEMIA DUE TO CHRONIC BLOOD LOSS: Primary | ICD-10-CM

## 2020-01-24 DIAGNOSIS — R06.02 SHORTNESS OF BREATH: ICD-10-CM

## 2020-01-24 DIAGNOSIS — K51.011 ULCERATIVE PANCOLITIS WITH RECTAL BLEEDING (HCC): ICD-10-CM

## 2020-01-24 DIAGNOSIS — R53.83 OTHER FATIGUE: ICD-10-CM

## 2020-01-24 PROCEDURE — 96365 THER/PROPH/DIAG IV INF INIT: CPT

## 2020-01-24 PROCEDURE — 2580000003 HC RX 258: Performed by: PHYSICIAN ASSISTANT

## 2020-01-24 PROCEDURE — 96366 THER/PROPH/DIAG IV INF ADDON: CPT

## 2020-01-24 PROCEDURE — 99211 OFF/OP EST MAY X REQ PHY/QHP: CPT

## 2020-01-24 PROCEDURE — 6360000002 HC RX W HCPCS: Performed by: PHYSICIAN ASSISTANT

## 2020-01-24 RX ORDER — SODIUM CHLORIDE 0.9 % (FLUSH) 0.9 %
5 SYRINGE (ML) INJECTION PRN
Status: CANCELLED | OUTPATIENT
Start: 2020-01-31

## 2020-01-24 RX ORDER — DIPHENHYDRAMINE HYDROCHLORIDE 50 MG/ML
50 INJECTION INTRAMUSCULAR; INTRAVENOUS ONCE
Status: CANCELLED | OUTPATIENT
Start: 2020-01-31

## 2020-01-24 RX ORDER — HEPARIN SODIUM (PORCINE) LOCK FLUSH IV SOLN 100 UNIT/ML 100 UNIT/ML
500 SOLUTION INTRAVENOUS PRN
Status: CANCELLED | OUTPATIENT
Start: 2020-01-31

## 2020-01-24 RX ORDER — SODIUM CHLORIDE 9 MG/ML
INJECTION, SOLUTION INTRAVENOUS CONTINUOUS
Status: ACTIVE | OUTPATIENT
Start: 2020-01-24 | End: 2020-01-24

## 2020-01-24 RX ORDER — SODIUM CHLORIDE 9 MG/ML
INJECTION, SOLUTION INTRAVENOUS CONTINUOUS
Status: CANCELLED | OUTPATIENT
Start: 2020-01-31

## 2020-01-24 RX ORDER — METHYLPREDNISOLONE SODIUM SUCCINATE 125 MG/2ML
125 INJECTION, POWDER, LYOPHILIZED, FOR SOLUTION INTRAMUSCULAR; INTRAVENOUS ONCE
Status: CANCELLED | OUTPATIENT
Start: 2020-01-31

## 2020-01-24 RX ORDER — MEPERIDINE HYDROCHLORIDE 25 MG/ML
12.5 INJECTION INTRAMUSCULAR; INTRAVENOUS; SUBCUTANEOUS ONCE
Status: CANCELLED | OUTPATIENT
Start: 2020-01-31

## 2020-01-24 RX ORDER — SODIUM CHLORIDE 0.9 % (FLUSH) 0.9 %
10 SYRINGE (ML) INJECTION PRN
Status: CANCELLED | OUTPATIENT
Start: 2020-01-31

## 2020-01-24 RX ADMIN — IRON SUCROSE 300 MG: 20 INJECTION, SOLUTION INTRAVENOUS at 08:40

## 2020-01-24 RX ADMIN — SODIUM CHLORIDE: 9 INJECTION, SOLUTION INTRAVENOUS at 08:30

## 2020-01-24 NOTE — PROGRESS NOTES
Patient assessed for the following post iron treatment:    Dizziness   No  Lightheadedness  No      Acute nausea/vomiting No  Headache   No  Chest pain/pressure  No  Rash/itching   No  Shortness of breath  No    Patient kept for 20 minutes observation post infusion Venofer. Patient tolerated Venofer treatment without any complications. Last vital signs:   BP (!) 90/57   Pulse 74   Temp 98.4 °F (36.9 °C) (Oral)   Resp 18   Ht 5' 5\" (1.651 m)   Wt 172 lb (78 kg)   SpO2 95%   BMI 28.62 kg/m²     Attending physician notified of hypotension without symptoms, orders received: Yes and okay to discharge home. Instructed patient to drink lots of water today and monitor for dizziness. Patient instructed if experience any of the above symptoms following today's infusion, she is to notify MD immediately or go to the emergency department. Discharge instructions given to patient. Verbalizes understanding. Ambulated off unit per self, with belongings.

## 2020-01-24 NOTE — PLAN OF CARE
Problem: Musculor/Skeletal Functional Status  Goal: Absence of falls  Outcome: Met This Shift  Note:   Patient free from falls while in O.P. Oncology. Intervention: Fall precautions  Note:   Patient assessed for fall risk on admission to [facility]. Fall band placed on patient. Discussed the need to use the call light for assistance prior to getting up out of chair/bed. Problem: Intellectual/Education/Knowledge Deficit  Goal: Teaching initiated upon admission  Outcome: Met This Shift  Note:   Patient verbalizes understanding to verbal information given on Venofer ,action and possible side effects. Aware to call MD if develop complications. Intervention: Verbal/written education provided  Note:   Venofer reviewed, patient verbalizes understanding of medication being administered and potential side effects. Problem: Discharge Planning  Goal: Knowledge of discharge instructions  Description  Knowledge of discharge instructions     Outcome: Met This Shift  Note:   Verbalized understanding of discharge instructions, follow-up appointments, and when to call the physician. Intervention: Interaction with patient/family and care team  Note:   Discuss understanding of discharge instructions,follow-up appointments, and when to call the physician. Care plan reviewed with patient. Patient verbalize understanding of the plan of care and contribute to goal setting.

## 2020-02-25 ENCOUNTER — OFFICE VISIT (OUTPATIENT)
Dept: ONCOLOGY | Age: 35
End: 2020-02-25
Payer: COMMERCIAL

## 2020-02-25 ENCOUNTER — HOSPITAL ENCOUNTER (OUTPATIENT)
Dept: INFUSION THERAPY | Age: 35
Discharge: HOME | End: 2020-02-25
Payer: COMMERCIAL

## 2020-02-25 VITALS
RESPIRATION RATE: 20 BRPM | WEIGHT: 175.6 LBS | OXYGEN SATURATION: 96 % | TEMPERATURE: 98.9 F | BODY MASS INDEX: 29.26 KG/M2 | HEART RATE: 84 BPM | HEIGHT: 65 IN | SYSTOLIC BLOOD PRESSURE: 119 MMHG | DIASTOLIC BLOOD PRESSURE: 59 MMHG

## 2020-02-25 DIAGNOSIS — D50.0 IRON DEFICIENCY ANEMIA DUE TO CHRONIC BLOOD LOSS: ICD-10-CM

## 2020-02-25 LAB
BASINOPHIL, AUTOMATED: 0 % (ref 0–3)
EOSINOPHILS RELATIVE PERCENT: 5 % (ref 0–4)
FERRITIN: 20 NG/ML (ref 10–291)
HCT VFR BLD CALC: 33.8 % (ref 37–47)
HEMOGLOBIN: 10.9 GM/DL (ref 12–16)
IRON: 38 UG/DL (ref 50–170)
LYMPHOCYTES # BLD: 43 % (ref 15–47)
MCH RBC QN AUTO: 26 PG (ref 27–31)
MCHC RBC AUTO-ENTMCNC: 32.3 GM/DL (ref 33–37)
MCV RBC AUTO: 81 FL (ref 81–99)
MONOCYTES: 11 % (ref 0–12)
PDW BLD-RTO: 19.3 % (ref 11.5–14.5)
PLATELET # BLD: 361 THOU/MM3 (ref 130–400)
PMV BLD AUTO: 7.8 FL (ref 7.4–10.4)
RBC # BLD: 4.19 MILL/MM3 (ref 4.2–5.4)
SEG NEUTROPHILS: 41 % (ref 43–75)
TOTAL IRON BINDING CAPACITY: 327 UG/DL (ref 171–450)
WBC # BLD: 6.2 THOU/MM3 (ref 4.8–10.8)

## 2020-02-25 PROCEDURE — 85025 COMPLETE CBC W/AUTO DIFF WBC: CPT

## 2020-02-25 PROCEDURE — 83540 ASSAY OF IRON: CPT

## 2020-02-25 PROCEDURE — 99211 OFF/OP EST MAY X REQ PHY/QHP: CPT

## 2020-02-25 PROCEDURE — 83550 IRON BINDING TEST: CPT

## 2020-02-25 PROCEDURE — 82728 ASSAY OF FERRITIN: CPT

## 2020-02-25 PROCEDURE — 36415 COLL VENOUS BLD VENIPUNCTURE: CPT

## 2020-02-25 PROCEDURE — 99214 OFFICE O/P EST MOD 30 MIN: CPT | Performed by: NURSE PRACTITIONER

## 2020-02-26 NOTE — PROGRESS NOTES
[facility] [address] [place] [address] [address]  [address] [address]  Dept: [phone]  Dept Fax: [phone]  Loc: [phone]      Cristiana Disla is a 29 y.o. female who presents today for Follow-up (IRON DEFICIENCY ANEMIA)      HPI:     Cristiana Disla is a 29 y.o. female followed by Dr. Gino Sesay for anemia. Per Dr. Greenfield Older note on 9/30/19: She has known history of iron deficiency anemia in the setting of ulcerative colitis. She was hospitalized from November 29, 2018 to December 1, 2018 for chronic blood loss anemia due to GI bleed.  Upon admission her hemoglobin was 6.1. She was transfused 2 units of PRBCs.  CT of the abdomen and pelvis on 11/30/18 showed mild thickening and inflammation of the distal colon consistent with the history of ulcerative colitis. Devyn Ross was given steroids for possible UC flare and initially given atbx.  She was d/c home with steroid taper, continue the Liada, and atbx of GI choice at d/c. I saw the patient on December 6, 2018 and due to her being symptomatic the patient received iron infusion. She states after having her son on 9/6/19 she had postpartum bleeding for 5-6 weeks. She is breastfeeding as well. The patient states she has not resumed menses. She admits to intermittent bright red blood in her stool related to hemorrhoids. Also, she recently had an ulcerative colitis flare from increased stress. Her father was recently diagnosed with head and neck cancer and has been undergoing treatment.        Interval History 2/25/2020: The patient is here with her  for follow up evaluation of iron deficiency anemia. She states that she is feeling better since her IV iron infusions. She received Injectofer 1/10, 1/17 and 1/24. She denies lightheadedness, dizziness, pica symptoms, craving ice or SOB/AVILES or chest pain/palpitations, hair or nails changes, skin changes. Patient reports she is going to have an EGD and Colonoscopy with Dr. Geremias Ruiz.   Her blood counts have improved since last visit. Hgb 10.9, MCV 81, plts 361, Ferritin 20, Iron 38 and TIBC 327. She is taking prenatal vitamins. The patient is allergic to pine and nsaids. Past Medical History  Aime Smyth  has a past medical history of Anemia, Bruises easily, Histoplasmosis, Mental disorder, and Ulcerative colitis ([address] Medications    Current Outpatient Medications:     mesalamine (LIALDA) 1.2 g EC tablet, Take 4 tablets by mouth daily, Disp: 120 tablet, Rfl: 6    mesalamine (CANASA) 1000 MG suppository, Place 1 suppository rectally nightly, Disp: 30 suppository, Rfl: 6    traMADol (ULTRAM) 50 MG tablet, Take 50 mg by mouth every 6 hours as needed for Pain., Disp: , Rfl:     acetaminophen (TYLENOL) 500 MG tablet, Take 1,000 mg by mouth every 6 hours as needed for Pain, Disp: , Rfl:     aspirin-acetaminophen-caffeine (EXCEDRIN MIGRAINE) 250-250-65 MG per tablet, Take 2 tablets by mouth every 6 hours as needed for Headaches, Disp: , Rfl:     Prenatal MV-Min-Fe Fum-FA-DHA (PRENATAL 1 PO), Take by mouth daily, Disp: , Rfl:     polyethylene glycol (GLYCOLAX) powder, Dispense 238 Gram Bottle. Use as Directed (Patient not taking: Reported on 2/25/2020), Disp: 238 g, Rfl: 0    NUVARING 0.12-0.015 MG/24HR vaginal ring, INSERT 1 VAGINAL RING BY VAGINAL ROUTE ONCE A MONTH. LEAVE IN PLACE FOR 3 WEEKS REMOVE FOR 1 WEEK, Disp: , Rfl:     Review of Systems      Review of Systems  Negative except for what is noted in HPI  Objective:     Vitals:    02/25/20 1207   BP: (!) 119/59   Site: Left Upper Arm   Position: Sitting   Cuff Size: Medium Adult   Pulse: 84   Resp: 20   Temp: 98.9 °F (37.2 °C)   TempSrc: Oral   SpO2: 96%   Weight: 175 lb 9.6 oz (79.7 kg)   Height: 5' 5\" (1.651 m)       General appearance: No apparent distress, alert and cooperative. HEENT: Pupils equal, round, and reactive to light. Conjunctivae/corneas clear.  Oral mucosa moist, without oropharyngeal pallor or exudate  Neck: Supple, with

## 2020-05-21 ENCOUNTER — OFFICE VISIT (OUTPATIENT)
Dept: ONCOLOGY | Age: 35
End: 2020-05-21
Payer: COMMERCIAL

## 2020-05-21 ENCOUNTER — HOSPITAL ENCOUNTER (OUTPATIENT)
Dept: INFUSION THERAPY | Age: 35
Discharge: HOME | End: 2020-05-21
Payer: COMMERCIAL

## 2020-05-21 VITALS
HEIGHT: 65 IN | BODY MASS INDEX: 28.69 KG/M2 | HEART RATE: 79 BPM | OXYGEN SATURATION: 97 % | SYSTOLIC BLOOD PRESSURE: 118 MMHG | RESPIRATION RATE: 16 BRPM | DIASTOLIC BLOOD PRESSURE: 70 MMHG | WEIGHT: 172.2 LBS | TEMPERATURE: 98.5 F

## 2020-05-21 DIAGNOSIS — D50.0 IRON DEFICIENCY ANEMIA DUE TO CHRONIC BLOOD LOSS: ICD-10-CM

## 2020-05-21 LAB
BACTERIA: ABNORMAL /HPF
BASOPHILS # BLD: 0.5 %
BASOPHILS ABSOLUTE: 0 THOU/MM3 (ref 0–0.1)
BILIRUBIN URINE: NEGATIVE
BLOOD, URINE: ABNORMAL
CASTS 2: ABNORMAL /LPF
CASTS UA: ABNORMAL /LPF
CHARACTER, URINE: ABNORMAL
COLOR: YELLOW
CRYSTALS, UA: ABNORMAL
EOSINOPHIL # BLD: 4.1 %
EOSINOPHILS ABSOLUTE: 0.3 THOU/MM3 (ref 0–0.4)
EPITHELIAL CELLS, UA: ABNORMAL /HPF
ERYTHROCYTE [DISTWIDTH] IN BLOOD BY AUTOMATED COUNT: 15.9 % (ref 11.5–14.5)
ERYTHROCYTE [DISTWIDTH] IN BLOOD BY AUTOMATED COUNT: 45.6 FL (ref 35–45)
FERRITIN: 16 NG/ML (ref 10–291)
GLUCOSE URINE: NEGATIVE MG/DL
HCT VFR BLD CALC: 36.4 % (ref 37–47)
HEMOGLOBIN: 11.3 GM/DL (ref 12–16)
IMMATURE GRANS (ABS): 0.02 THOU/MM3 (ref 0–0.07)
IMMATURE GRANULOCYTES: 0.3 %
IRON: 171 UG/DL (ref 50–170)
KETONES, URINE: ABNORMAL
LEUKOCYTE ESTERASE, URINE: ABNORMAL
LYMPHOCYTES # BLD: 23.8 %
LYMPHOCYTES ABSOLUTE: 1.7 THOU/MM3 (ref 1–4.8)
MCH RBC QN AUTO: 25 PG (ref 26–33)
MCHC RBC AUTO-ENTMCNC: 31 GM/DL (ref 32.2–35.5)
MCV RBC AUTO: 80.5 FL (ref 81–99)
MISCELLANEOUS 2: ABNORMAL
MONOCYTES # BLD: 8.3 %
MONOCYTES ABSOLUTE: 0.6 THOU/MM3 (ref 0.4–1.3)
NITRITE, URINE: POSITIVE
NUCLEATED RED BLOOD CELLS: 0 /100 WBC
PH UA: 5.5 (ref 5–9)
PLATELET # BLD: 285 THOU/MM3 (ref 130–400)
PMV BLD AUTO: 11.6 FL (ref 9.4–12.4)
PROTEIN UA: ABNORMAL
RBC # BLD: 4.52 MILL/MM3 (ref 4.2–5.4)
RBC URINE: ABNORMAL /HPF
RENAL EPITHELIAL, UA: ABNORMAL
SEG NEUTROPHILS: 63 %
SEGMENTED NEUTROPHILS ABSOLUTE COUNT: 4.6 THOU/MM3 (ref 1.8–7.7)
SPECIFIC GRAVITY, URINE: 1.02 (ref 1–1.03)
TOTAL IRON BINDING CAPACITY: 322 UG/DL (ref 171–450)
UROBILINOGEN, URINE: 0.2 EU/DL (ref 0–1)
WBC # BLD: 7.3 THOU/MM3 (ref 4.8–10.8)
WBC UA: > 200 /HPF
YEAST: ABNORMAL

## 2020-05-21 PROCEDURE — 83540 ASSAY OF IRON: CPT

## 2020-05-21 PROCEDURE — 81001 URINALYSIS AUTO W/SCOPE: CPT

## 2020-05-21 PROCEDURE — 36415 COLL VENOUS BLD VENIPUNCTURE: CPT

## 2020-05-21 PROCEDURE — 87077 CULTURE AEROBIC IDENTIFY: CPT

## 2020-05-21 PROCEDURE — 83550 IRON BINDING TEST: CPT

## 2020-05-21 PROCEDURE — 87186 SC STD MICRODIL/AGAR DIL: CPT

## 2020-05-21 PROCEDURE — 99211 OFF/OP EST MAY X REQ PHY/QHP: CPT

## 2020-05-21 PROCEDURE — 87086 URINE CULTURE/COLONY COUNT: CPT

## 2020-05-21 PROCEDURE — 82728 ASSAY OF FERRITIN: CPT

## 2020-05-21 PROCEDURE — 99213 OFFICE O/P EST LOW 20 MIN: CPT | Performed by: PHYSICIAN ASSISTANT

## 2020-05-21 PROCEDURE — 85025 COMPLETE CBC W/AUTO DIFF WBC: CPT

## 2020-05-21 RX ORDER — SULFAMETHOXAZOLE AND TRIMETHOPRIM 800; 160 MG/1; MG/1
1 TABLET ORAL 2 TIMES DAILY
Qty: 6 TABLET | Refills: 0 | Status: SHIPPED | OUTPATIENT
Start: 2020-05-21 | End: 2020-05-24

## 2020-05-21 NOTE — PROGRESS NOTES
[facility] [address] [facility] [address] [facility] [address]  [place] [address]  Dept: [phone]  Dept Fax: [phone] Loc: [phone]      Visit Date:5/21/2020     Jess Rick is a 29 y.o. female who presents today for:   Chief Complaint   Patient presents with    Follow-up     iron deficency anemia        HPI:   Jess Rick is a 29 y.o. female followed by Dr. Mattie Gray for anemia. Per Dr. Nicolas Lambert note on 9/30/19: She has known history of iron deficiency anemia in the setting of ulcerative colitis. She was hospitalized from November 29, 2018 to December 1, 2018 for chronic blood loss anemia due to GI bleed. Upon admission her hemoglobin was 6.1. She was transfused 2 units of PRBCs. CT of the abdomen and pelvis on 11/30/18 showed mild thickening and inflammation of the distal colon consistent with the history of ulcerative colitis. Jessica Huffman was given steroids for possible UC flare and initially given atbx.  She was d/c home with steroid taper, continue the Liada, and atbx of GI choice at d/c. The patient has intermittently received IV iron since her initial visit in 2018. She received Venofer in January 2020. Interval History 5/21/2020: The patient is here for follow up evaluation of iron deficiency anemia. She admits to fatigue recently but is unsure if this is related to low iron or not. Denies chest pain, SOB, AVILES, chest pain, palpitations, hair or nail changes, pica symptoms. The patient states she has had several episodes of rectal bleeding and mild flare of UC. She was to have an EGD/colonoscopy with Dr. JAYLEEN Ramirez 106; however, this was postponed due to Radha. Denies any epistaxis, hemoptysis, hematemesis, melena, hematochezia, hematuria. Her menses has not resumed since she had her son in September 2020. She is breastfeeding. The patient reports dysuria, frequency today.       HPI   Past Medical History:   Diagnosis Date    Anemia     Bruises easily     Histoplasmosis     Mental 08/10/1986    Cervical cancer screen  08/10/2006    Flu vaccine (Season Ended) 09/01/2020    DTaP/Tdap/Td vaccine (2 - Td) 08/22/2029    HIV screen  Completed    Hepatitis A vaccine  Aged Out    Hepatitis B vaccine  Aged Out    Hib vaccine  Aged Out    Meningococcal (ACWY) vaccine  Aged Out    Pneumococcal 0-64 years Vaccine  Aged Out       Review of Systems:   Review of Systems   Pertinent review of systems noted in HPI, all other ROS negative. Objective:   Physical Exam   /70 (Site: Left Upper Arm, Position: Sitting, Cuff Size: Medium Adult)   Pulse 79   Temp 98.5 °F (36.9 °C) (Tympanic)   Resp 16   Ht 5' 5\" (1.651 m)   Wt 172 lb 3.2 oz (78.1 kg)   SpO2 97%   BMI 28.66 kg/m²    General appearance: No apparent distress, well developed and cooperative. HEENT: Pupils equal, round, and reactive to light. Conjunctivae/corneas clear. Oral mucosa moist.   Neck: Supple, with full range of motion. Trachea midline. Respiratory:  Normal respiratory effort. Clear to auscultation, bilaterally without Rales/Wheezes/Rhonchi. Cardiovascular: Regular rate and rhythm with normal S1/S2   Abdomen: Soft, non-tender, non-distended with active bowel sounds. Musculoskeletal: No clubbing, cyanosis or edema bilaterally. Skin: Skin color, texture, turgor normal.  No rashes or lesions. Neurologic:  Neurovascularly intact without any focal sensory/motor deficits.    Psychiatric: Alert and oriented      Imaging Studies and Labs:   CBC:     Hematology 5/21/2020 2/25/2020 1/17/2020   WBC 7.3 6.2 5.0   RBC 4.52 4.19 (L) 4.28   HGB 11.3 (L) 10.9 (L) 9.5 (L)   HCT 36.4 (L) 33.8 (L) 31.1 (L)   MCV 80.5 (L) 81 73 (L)   RDW  19.3 (H) 19.9 (H)    361 482 (H)   Ferritin 16 20    Iron 171 (H) 38 (L)    TIBC 322 327          BMP:   Lab Results   Component Value Date     09/30/2019    K 4.1 09/30/2019    K 4.3 12/01/2018     09/30/2019    CO2 28 09/30/2019    BUN 13 09/30/2019    CREATININE 1.3 09/30/2019

## 2020-05-21 NOTE — PATIENT INSTRUCTIONS
1. Will obtain CBC, ferritin, iron, TIBC and U/A today  2. Return to clinic in 3 months  3. Labs on RTC: CBC, ferritin  4. Patient instructed to call if develops tiredness, lightheadedness, shortness of breath with activity, craving ice. 5. Please call for questions or concerns.

## 2020-05-23 LAB
ORGANISM: ABNORMAL
URINE CULTURE REFLEX: ABNORMAL

## 2021-02-05 ENCOUNTER — HOSPITAL ENCOUNTER (OUTPATIENT)
Dept: INFUSION THERAPY | Age: 36
Discharge: HOME | End: 2021-02-05
Payer: COMMERCIAL

## 2021-02-05 ENCOUNTER — OFFICE VISIT (OUTPATIENT)
Dept: ONCOLOGY | Age: 36
End: 2021-02-05
Payer: COMMERCIAL

## 2021-02-05 VITALS
RESPIRATION RATE: 16 BRPM | SYSTOLIC BLOOD PRESSURE: 133 MMHG | OXYGEN SATURATION: 97 % | BODY MASS INDEX: 27.89 KG/M2 | HEART RATE: 99 BPM | DIASTOLIC BLOOD PRESSURE: 65 MMHG | WEIGHT: 167.4 LBS | HEIGHT: 65 IN | TEMPERATURE: 98.1 F

## 2021-02-05 DIAGNOSIS — D50.0 IRON DEFICIENCY ANEMIA DUE TO CHRONIC BLOOD LOSS: Primary | ICD-10-CM

## 2021-02-05 DIAGNOSIS — K51.811 OTHER ULCERATIVE COLITIS WITH RECTAL BLEEDING (HCC): ICD-10-CM

## 2021-02-05 DIAGNOSIS — D50.0 IRON DEFICIENCY ANEMIA DUE TO CHRONIC BLOOD LOSS: ICD-10-CM

## 2021-02-05 LAB
ABSOLUTE IMMATURE GRANULOCYTE: 0.01 THOU/MM3 (ref 0–0.07)
BASINOPHIL, AUTOMATED: 1 % (ref 0–3)
BASOPHILS ABSOLUTE: 0.1 THOU/MM3 (ref 0–0.1)
EOSINOPHILS ABSOLUTE: 0.4 THOU/MM3 (ref 0–0.4)
EOSINOPHILS RELATIVE PERCENT: 7 % (ref 0–4)
FERRITIN: 11 NG/ML (ref 10–291)
HCT VFR BLD CALC: 29.2 % (ref 37–47)
HEMOGLOBIN: 8.7 GM/DL (ref 12–16)
IMMATURE GRANULOCYTES: 0 %
LYMPHOCYTES # BLD: 30 % (ref 15–47)
LYMPHOCYTES ABSOLUTE: 1.5 THOU/MM3 (ref 1–4.8)
MCH RBC QN AUTO: 25.2 PG (ref 26–33)
MCHC RBC AUTO-ENTMCNC: 29.8 GM/DL (ref 32.2–35.5)
MCV RBC AUTO: 85 FL (ref 81–99)
MONOCYTES ABSOLUTE: 0.5 THOU/MM3 (ref 0.4–1.3)
MONOCYTES: 10 % (ref 0–12)
PDW BLD-RTO: 14.3 % (ref 11.5–14.5)
PLATELET # BLD: 421 THOU/MM3 (ref 130–400)
PMV BLD AUTO: 9.4 FL (ref 9.4–12.4)
RBC # BLD: 3.45 MILL/MM3 (ref 4.2–5.4)
SEG NEUTROPHILS: 51 % (ref 43–75)
SEGMENTED NEUTROPHILS ABSOLUTE COUNT: 2.6 THOU/MM3 (ref 1.8–7.7)
WBC # BLD: 5 THOU/MM3 (ref 4.8–10.8)

## 2021-02-05 PROCEDURE — 99214 OFFICE O/P EST MOD 30 MIN: CPT | Performed by: PHYSICIAN ASSISTANT

## 2021-02-05 PROCEDURE — 82728 ASSAY OF FERRITIN: CPT

## 2021-02-05 PROCEDURE — 36415 COLL VENOUS BLD VENIPUNCTURE: CPT

## 2021-02-05 PROCEDURE — 99211 OFF/OP EST MAY X REQ PHY/QHP: CPT

## 2021-02-05 PROCEDURE — 85025 COMPLETE CBC W/AUTO DIFF WBC: CPT

## 2021-02-05 RX ORDER — SODIUM CHLORIDE 0.9 % (FLUSH) 0.9 %
5 SYRINGE (ML) INJECTION PRN
Status: CANCELLED | OUTPATIENT
Start: 2021-02-12

## 2021-02-05 RX ORDER — MEPERIDINE HYDROCHLORIDE 50 MG/ML
12.5 INJECTION INTRAMUSCULAR; INTRAVENOUS; SUBCUTANEOUS ONCE
Status: CANCELLED | OUTPATIENT
Start: 2021-02-12 | End: 2021-02-05

## 2021-02-05 RX ORDER — DIPHENHYDRAMINE HYDROCHLORIDE 50 MG/ML
50 INJECTION INTRAMUSCULAR; INTRAVENOUS ONCE
Status: CANCELLED | OUTPATIENT
Start: 2021-02-12 | End: 2021-02-05

## 2021-02-05 RX ORDER — METHYLPREDNISOLONE SODIUM SUCCINATE 125 MG/2ML
125 INJECTION, POWDER, LYOPHILIZED, FOR SOLUTION INTRAMUSCULAR; INTRAVENOUS ONCE
Status: CANCELLED | OUTPATIENT
Start: 2021-02-12 | End: 2021-02-05

## 2021-02-05 RX ORDER — EPINEPHRINE 1 MG/ML
0.3 INJECTION, SOLUTION, CONCENTRATE INTRAVENOUS PRN
Status: CANCELLED | OUTPATIENT
Start: 2021-02-12

## 2021-02-05 RX ORDER — SODIUM CHLORIDE 9 MG/ML
INJECTION, SOLUTION INTRAVENOUS CONTINUOUS
Status: CANCELLED | OUTPATIENT
Start: 2021-02-12

## 2021-02-05 RX ORDER — SODIUM CHLORIDE 0.9 % (FLUSH) 0.9 %
10 SYRINGE (ML) INJECTION PRN
Status: CANCELLED | OUTPATIENT
Start: 2021-02-12

## 2021-02-05 RX ORDER — HEPARIN SODIUM (PORCINE) LOCK FLUSH IV SOLN 100 UNIT/ML 100 UNIT/ML
500 SOLUTION INTRAVENOUS PRN
Status: CANCELLED | OUTPATIENT
Start: 2021-02-12

## 2021-02-05 NOTE — PATIENT INSTRUCTIONS
1. Will schedule IV Venofer, total of 3 infusions to be given at least 1 week apart  2. Return to clinic in 7 weeks  3. CBC, ferritin on RTC  4. Please call for questions or concerns.

## 2021-02-05 NOTE — PROGRESS NOTES
[facility] [address] [facility] [address], [address] [place] [address]  [address] [address]  Dept: [phone]  Dept Fax: [phone] Loc: [phone]      Visit Date:2/5/2021     Aileen Contreras is a 28 y.o. female who presents today for:   Chief Complaint   Patient presents with    Follow-up     Iron deficiency anemia due to chronic blood loss        HPI:   Aileen Contreras is a 28 y.o. female followed for anemia. She has iron deficiency anemia in the setting of ulcerative colitis. Patient has intermittently required IV iron therapy related to chronic blood loss from history of ulcerative colitis. She last received IV Venofer in January 2020. Interval History 2/5/2021:   The patient is here for follow up evaluation of anemia. She was last seen in May 2020. Patient reports over the last 2 to 4 weeks she has had increased fatigue, generalized weakness, intermittent lightheadedness and dizziness. She affirms palpitations with exertion. The patient affirms pica symptoms. She states her symptoms are similar to when she has required IV iron in the past.  She reports she feels that her ulcerative colitis is currently under control, but does admit to intermittent hematochezia. She feels that the rectal bleeding is related to hemorrhoids. The patient had been scheduled to have a colonoscopy in the spring 2020 which was canceled due to COVID-19 and has not been rescheduled. She denies epistaxis, hemoptysis, hematemesis, melena, hematuria or vaginal bleeding. She currently has a 12month-old and is breastfeeding. Her menses have not returned since the birth of her son. She denies pregnancy. PMH, SH, and FH:  I reviewed the patients medication list and allergy list as noted on the electronic medical record. The PMH, SH and FH were also reviewed as noted on the EMR. Review of Systems:   Review of Systems   Pertinent review of systems noted in HPI, all other ROS negative.    Objective: Physical Exam   /65 (Site: Left Upper Arm, Position: Sitting, Cuff Size: Medium Adult)   Pulse 99   Temp 98.1 °F (36.7 °C) (Oral)   Resp 16   Ht 5' 5\" (1.651 m)   Wt 167 lb 6.4 oz (75.9 kg)   SpO2 97%   BMI 27.86 kg/m²    General appearance: No apparent distress, well developed and cooperative. HEENT: Pupils equal, round, and reactive to light. Conjunctivae- pale/corneas clear. Oral mucosa moist.   Neck: Supple, with full range of motion. Trachea midline. Respiratory:  Normal respiratory effort. Clear to auscultation, bilaterally without Rales/Wheezes/Rhonchi. Cardiovascular: Regular rate and rhythm with normal S1/S2  Abdomen: Soft, non-tender, non-distended with active bowel sounds. Musculoskeletal: No clubbing, cyanosis or edema bilaterally. Skin: Skin color - pale, texture, turgor normal.  No visible rashes or lesions. Neurologic:  Neurovascularly intact without any focal sensory/motor deficits. Psychiatric: Alert and oriented      Imaging Studies and Labs:   CBC:      Hematology 2/5/2021 5/21/2020 2/25/2020   WBC 5.0 7.3 6.2   RBC 3.45 (L) 4.52 4.19 (L)   HGB 8.7 (L) 11.3 (L) 10.9 (L)   HCT 29.2 (L) 36.4 (L) 33.8 (L)   MCV 85 80.5 (L) 81   RDW 14.3  19.3 (H)    (H) 285 361   Ferritin  16 20   Iron  171 (H) 38 (L)   TIBC  322 327     Hematology 1/17/2020   WBC 5.0   RBC 4.28   HGB 9.5 (L)   HCT 31.1 (L)   MCV 73 (L)   RDW 19.9 (H)    (H)   Ferritin    Iron    TIBC         Assessment and Plan:   1. Iron Deficiency Anemia  Secondary to chronic GI blood loss related to ulcerative colitis. She last required IV iron in January 2020. Today on 2/5/21: Hgb has dropped to 8.7 compared to 11.3 on 5/21/20.    -Will schedule for IV Venofer, total of 3 infusions to be given one week apart.  Venofer is recommended as the patient is breastfeeding.                -Will continue to monitor Hgb/Hct and iron levels -Patient instructed to follow up with GI due to drop in Hgb/hct and hematochezia              -Return to clinic in 7 weeks              -Labs at follow up appt: CBC, ferritin  2. Ulcerative Colitis  Patient instructed to follow up with GI.     RTC in 7 weeks       All patient questions answered. Pt voiced understanding. Patient agreed with treatment plan. Follow up as directed. Patient instructed to call for questions or concerns.        Electronically signed by   Windy Ruiz PA-C

## 2021-02-10 ENCOUNTER — HOSPITAL ENCOUNTER (EMERGENCY)
Age: 36
Discharge: HOME | End: 2021-02-10
Attending: EMERGENCY MEDICINE
Payer: COMMERCIAL

## 2021-02-10 VITALS
DIASTOLIC BLOOD PRESSURE: 77 MMHG | BODY MASS INDEX: 26.66 KG/M2 | HEIGHT: 65 IN | RESPIRATION RATE: 17 BRPM | OXYGEN SATURATION: 100 % | TEMPERATURE: 97.9 F | SYSTOLIC BLOOD PRESSURE: 119 MMHG | WEIGHT: 160 LBS | HEART RATE: 83 BPM

## 2021-02-10 DIAGNOSIS — R00.2 PALPITATIONS: ICD-10-CM

## 2021-02-10 DIAGNOSIS — K62.5 RECTAL/ANAL HEMORRHAGE: ICD-10-CM

## 2021-02-10 DIAGNOSIS — K64.9 HEMORRHOIDS, UNSPECIFIED HEMORRHOID TYPE: ICD-10-CM

## 2021-02-10 DIAGNOSIS — K51.90 ULCERATIVE COLITIS WITHOUT COMPLICATIONS, UNSPECIFIED LOCATION (HCC): ICD-10-CM

## 2021-02-10 DIAGNOSIS — D64.9 CHRONIC ANEMIA: Primary | ICD-10-CM

## 2021-02-10 LAB
ABSOLUTE RETIC #: 126 THOU/MM3 (ref 20–115)
ALBUMIN SERPL-MCNC: 4.4 G/DL (ref 3.5–5.1)
ALP BLD-CCNC: 60 U/L (ref 38–126)
ALT SERPL-CCNC: 13 U/L (ref 11–66)
ANION GAP SERPL CALCULATED.3IONS-SCNC: 6 MEQ/L (ref 8–16)
AST SERPL-CCNC: 20 U/L (ref 5–40)
BASOPHILS # BLD: 0.9 %
BASOPHILS ABSOLUTE: 0.1 THOU/MM3 (ref 0–0.1)
BILIRUB SERPL-MCNC: < 0.2 MG/DL (ref 0.3–1.2)
BUN BLDV-MCNC: 22 MG/DL (ref 7–22)
CALCIUM SERPL-MCNC: 9.6 MG/DL (ref 8.5–10.5)
CHLORIDE BLD-SCNC: 100 MEQ/L (ref 98–111)
CO2: 28 MEQ/L (ref 23–33)
CREAT SERPL-MCNC: 1.7 MG/DL (ref 0.4–1.2)
EOSINOPHIL # BLD: 6.4 %
EOSINOPHILS ABSOLUTE: 0.4 THOU/MM3 (ref 0–0.4)
ERYTHROCYTE [DISTWIDTH] IN BLOOD BY AUTOMATED COUNT: 14.1 % (ref 11.5–14.5)
ERYTHROCYTE [DISTWIDTH] IN BLOOD BY AUTOMATED COUNT: 42.2 FL (ref 35–45)
GFR SERPL CREATININE-BSD FRML MDRD: 34 ML/MIN/1.73M2
GLUCOSE BLD-MCNC: 92 MG/DL (ref 70–108)
HCT VFR BLD CALC: 27.4 % (ref 37–47)
HEMOGLOBIN: 8.3 GM/DL (ref 12–16)
HYPOCHROMIA: PRESENT
IMMATURE GRANS (ABS): 0.02 THOU/MM3 (ref 0–0.07)
IMMATURE GRANULOCYTES: 0.3 %
IMMATURE RETIC FRACT: 28.4 % (ref 3–15.9)
LYMPHOCYTES # BLD: 40.9 %
LYMPHOCYTES ABSOLUTE: 2.4 THOU/MM3 (ref 1–4.8)
MCH RBC QN AUTO: 25.2 PG (ref 26–33)
MCHC RBC AUTO-ENTMCNC: 30.3 GM/DL (ref 32.2–35.5)
MCV RBC AUTO: 83 FL (ref 81–99)
MONOCYTES # BLD: 10.5 %
MONOCYTES ABSOLUTE: 0.6 THOU/MM3 (ref 0.4–1.3)
NUCLEATED RED BLOOD CELLS: 0 /100 WBC
OSMOLALITY CALCULATION: 271.2 MOSMOL/KG (ref 275–300)
PLATELET # BLD: 383 THOU/MM3 (ref 130–400)
PMV BLD AUTO: 9.9 FL (ref 9.4–12.4)
POTASSIUM SERPL-SCNC: 3.9 MEQ/L (ref 3.5–5.2)
RBC # BLD: 3.3 MILL/MM3 (ref 4.2–5.4)
RETIC HEMOGLOBIN: 21.4 PG (ref 28.2–35.7)
RETICULOCYTE ABSOLUTE COUNT: 3.7 % (ref 0.5–2)
SEG NEUTROPHILS: 41 %
SEGMENTED NEUTROPHILS ABSOLUTE COUNT: 2.4 THOU/MM3 (ref 1.8–7.7)
SODIUM BLD-SCNC: 134 MEQ/L (ref 135–145)
TOTAL PROTEIN: 7.3 G/DL (ref 6.1–8)
WBC # BLD: 5.8 THOU/MM3 (ref 4.8–10.8)

## 2021-02-10 PROCEDURE — 85046 RETICYTE/HGB CONCENTRATE: CPT

## 2021-02-10 PROCEDURE — 36415 COLL VENOUS BLD VENIPUNCTURE: CPT

## 2021-02-10 PROCEDURE — 99284 EMERGENCY DEPT VISIT MOD MDM: CPT

## 2021-02-10 PROCEDURE — 2580000003 HC RX 258: Performed by: EMERGENCY MEDICINE

## 2021-02-10 PROCEDURE — 85025 COMPLETE CBC W/AUTO DIFF WBC: CPT

## 2021-02-10 PROCEDURE — 80053 COMPREHEN METABOLIC PANEL: CPT

## 2021-02-10 RX ORDER — 0.9 % SODIUM CHLORIDE 0.9 %
500 INTRAVENOUS SOLUTION INTRAVENOUS ONCE
Status: COMPLETED | OUTPATIENT
Start: 2021-02-10 | End: 2021-02-10

## 2021-02-10 RX ADMIN — SODIUM CHLORIDE 500 ML: 9 INJECTION, SOLUTION INTRAVENOUS at 19:25

## 2021-02-10 ASSESSMENT — ENCOUNTER SYMPTOMS
CHEST TIGHTNESS: 0
VOMITING: 0
COUGH: 0
SORE THROAT: 0
DIARRHEA: 0
NAUSEA: 0
BACK PAIN: 0
ABDOMINAL PAIN: 0
EYE PAIN: 0
WHEEZING: 0
ABDOMINAL DISTENTION: 0
STRIDOR: 0
BLOOD IN STOOL: 1
EYE REDNESS: 0
PHOTOPHOBIA: 0
CONSTIPATION: 0
RHINORRHEA: 0
ANAL BLEEDING: 1
EYE ITCHING: 0
EYE DISCHARGE: 0
SHORTNESS OF BREATH: 0

## 2021-02-11 NOTE — ED NOTES
Pt resting on cot with unlabored respirations. Pt denies any needs at this time.       Daja ROBLES, LOGAN  02/10/21 2023

## 2021-02-11 NOTE — ED NOTES
Follow up with urology Upon first contact with patient this RN receives bedside shift report from Vanessa Burrows, [facility]. Pt resting on cot with easy respirations and family at bedside.         Tito QURESHI RN  02/10/21 1913

## 2021-02-11 NOTE — ED PROVIDER NOTES
[facility]  EMERGENCY DEPARTMENT ENCOUNTER      PATIENT NAME: Vahe Venegas  MRN: [id number]  : 1985  AVILES: 2/10/2021  PROVIDER: Rhonda Anderson MD      CHIEF COMPLAINT       Chief Complaint   Patient presents with    Rectal Bleeding       Nurses Notes reviewed and I agreeexcept as noted in the HPI. HISTORY OF PRESENT ILLNESS    Vahe Venegas is a 28 y.o. female who presents to Emergency Department with dizziness, low Hb and rectal bleeding. Past medical history remarkable for ulcerative colitis and iron deficiency anemia. Patient also has internal and external hemorrhoids with intermittent rectal bleeding. She has no abdominal pain, no anal pain. She has been having on and off dizziness over the past week which became worse today, therefore her hematologist Dr. Ronald River wanted her to come to ED for hemoglobin recheck. She is scheduled for another hemoglobin recheck on 2021 and  she is scheduled for iron transfusion on the day 2. She felt her heart is racing. No chest pain. No shortness of breath. No nausea and no vomiting. No abdominal pain. No urinary symptoms. Hemoglobin on 2021 was 8.7. This HPI was provided by the patient. REVIEW OF SYSTEMS     Review of Systems   Constitutional: Negative for activity change, appetite change, chills, fatigue, fever and unexpected weight change. HENT: Negative for congestion, ear discharge, ear pain, hearing loss, nosebleeds, rhinorrhea and sore throat. Eyes: Negative for photophobia, pain, discharge, redness and itching. Respiratory: Negative for cough, chest tightness, shortness of breath, wheezing and stridor. Cardiovascular: Negative for chest pain, palpitations and leg swelling. Gastrointestinal: Positive for anal bleeding and blood in stool. Negative for abdominal distention, abdominal pain, constipation, diarrhea, nausea and vomiting.    Endocrine: Negative for cold intolerance, heat intolerance, polydipsia and polyphagia. Genitourinary: Negative for dysuria, flank pain, frequency and hematuria. Musculoskeletal: Negative for arthralgias, back pain, gait problem, myalgias, neck pain and neck stiffness. Skin: Negative for pallor, rash and wound. Allergic/Immunologic: Negative for environmental allergies and food allergies. Neurological: Positive for dizziness. Negative for tremors, syncope, weakness and headaches. Psychiatric/Behavioral: Negative for agitation, behavioral problems, confusion, self-injury, sleep disturbance and suicidal ideas. PAST MEDICAL HISTORY     Past Medical History:   Diagnosis Date    Anemia     Bruises easily     Histoplasmosis     Mental disorder     anxiety    Ulcerative colitis ([facility] [address]        SURGICAL HISTORY       Past Surgical History:   Procedure Laterality Date    COLONOSCOPY  [id number]    unk    LUNG BIOPSY      SIGMOIDOSCOPY  2017       CURRENT MEDICATIONS       Discharge Medication List as of 2/10/2021  8:48 PM      CONTINUE these medications which have NOT CHANGED    Details   UNABLE TO FIND Take 2 tablets by mouth daily Indications: [employer] Blood Builder MinisHistorical Med      CRANBERRY-CALCIUM PO Take 2 each by mouth dailyHistorical Med      mesalamine (LIALDA) 1.2 g EC tablet Take 4 tablets by mouth daily, Disp-120 tablet, R-6Normal      NUVARING 0.12-0.015 MG/24HR vaginal ring INSERT 1 VAGINAL RING BY VAGINAL ROUTE ONCE A MONTH. LEAVE IN PLACE FOR 3 WEEKS REMOVE FOR 1 WEEK, DAWHistorical Med      traMADol (ULTRAM) 50 MG tablet Take 50 mg by mouth every 6 hours as needed for Pain. Historical Med      acetaminophen (TYLENOL) 500 MG tablet Take 1,000 mg by mouth every 6 hours as needed for PainHistorical Med      aspirin-acetaminophen-caffeine (EXCEDRIN MIGRAINE) 250-250-65 MG per tablet Take 2 tablets by mouth every 6 hours as needed for HeadachesHistorical Med      Prenatal MV-Min-Fe Fum-FA-DHA (PRENATAL 1 PO) Take by mouth dailyHistorical Med             ALLERGIES     [place] and Nsaids    FAMILY HISTORY     She indicated that her mother is alive. She indicated that her father is alive. She indicated that her maternal grandmother is alive. She indicated that her maternal grandfather is alive. She indicated that her paternal grandmother is alive. She indicated that her maternal aunt is alive. She indicated that the status of her paternal uncle is unknown. She indicated that the status of her neg hx is unknown.   family history includes Asthma in her mother; Cancer in her maternal aunt, maternal grandfather, and mother; Diabetes in her paternal uncle; Other in her father, mother, and paternal grandmother. SOCIAL HISTORY      reports that she has never smoked. She has never used smokeless tobacco. She reports that she does not drink alcohol or use drugs. PHYSICAL EXAM     INITIAL VITALS:  height is 5' 5\" (1.651 m) and weight is 160 lb (72.6 kg). Her oral temperature is 97.9 °F (36.6 °C). Her blood pressure is 119/77 and her pulse is 83. Her respiration is 17 and oxygen saturation is 100%. Physical Exam  Vitals signs and nursing note reviewed. Constitutional:       Appearance: She is well-developed. She is not diaphoretic. HENT:      Head: Normocephalic and atraumatic. Nose: Nose normal.   Eyes:      General: No scleral icterus. Right eye: No discharge. Left eye: No discharge. Conjunctiva/sclera: Conjunctivae normal.      Pupils: Pupils are equal, round, and reactive to light. Neck:      Musculoskeletal: Normal range of motion and neck supple. Vascular: No JVD. Trachea: No tracheal deviation. Cardiovascular:      Rate and Rhythm: Normal rate and regular rhythm. Heart sounds: Normal heart sounds. No murmur. No friction rub. No gallop. Pulmonary:      Effort: Pulmonary effort is normal. No respiratory distress. Breath sounds: Normal breath sounds. No stridor.  No wheezing or rales.   Chest:      Chest wall: No tenderness. Abdominal:      General: Bowel sounds are normal. There is no distension. Palpations: Abdomen is soft. There is no mass. Tenderness: There is no abdominal tenderness. There is no guarding or rebound. Hernia: No hernia is present. Genitourinary:     Comments: She would like to defer rectal exam to other providers such as GI or PCP. Musculoskeletal:         General: No tenderness or deformity. Lymphadenopathy:      Cervical: No cervical adenopathy. Skin:     General: Skin is warm and dry. Capillary Refill: Capillary refill takes less than 2 seconds. Coloration: Skin is not pale. Findings: No erythema or rash. Neurological:      Mental Status: She is alert and oriented to person, place, and time. Cranial Nerves: No cranial nerve deficit. Sensory: No sensory deficit. Motor: No abnormal muscle tone. Coordination: Coordination normal.      Deep Tendon Reflexes: Reflexes normal.   Psychiatric:         Behavior: Behavior normal.         Thought Content:  Thought content normal.         Judgment: Judgment normal.         DIFFERENTIAL DIAGNOSIS:   Anxiety, chronic anemia, hemorrhoid bleeding, rule out acute on chronic ischemia    DIAGNOSTIC RESULTS   EKG: All EKG's are interpreted by the Emergency Department Physician who either signs or Co-signsthis chart in the absence of a cardiologist.  Interpreted by me  Not indicated    RADIOLOGY: non-plain film images(s) such as CT, Ultrasound and MRI are read by the radiologist.  No orders to display       LABS:   Results for orders placed or performed during the hospital encounter of 02/10/21   CBC Auto Differential   Result Value Ref Range    WBC 5.8 4.8 - 10.8 thou/mm3    RBC 3.30 (L) 4.20 - 5.40 mill/mm3    Hemoglobin 8.3 (L) 12.0 - 16.0 gm/dl    Hematocrit 27.4 (L) 37.0 - 47.0 %    MCV 83.0 81.0 - 99.0 fL    MCH 25.2 (L) 26.0 - 33.0 pg    MCHC 30.3 (L) 32.2 - 35.5 gm/dl RDW-CV 14.1 11.5 - 14.5 %    RDW-SD 42.2 35.0 - 45.0 fL    Platelets 368 676 - 444 thou/mm3    MPV 9.9 9.4 - 12.4 fL    Seg Neutrophils 41.0 %    Lymphocytes 40.9 %    Monocytes 10.5 %    Eosinophils 6.4 %    Basophils 0.9 %    Immature Granulocytes 0.3 %    Segs Absolute 2.4 1.8 - 7.7 thou/mm3    Lymphocytes Absolute 2.4 1.0 - 4.8 thou/mm3    Monocytes Absolute 0.6 0.4 - 1.3 thou/mm3    Eosinophils Absolute 0.4 0.0 - 0.4 thou/mm3    Basophils Absolute 0.1 0.0 - 0.1 thou/mm3    Immature Grans (Abs) 0.02 0.00 - 0.07 thou/mm3    nRBC 0 /100 wbc    Hypochromia PRESENT Absent   Comprehensive Metabolic Panel   Result Value Ref Range    Glucose 92 70 - 108 mg/dL    CREATININE 1.7 (H) 0.4 - 1.2 mg/dL    BUN 22 7 - 22 mg/dL    Sodium 134 (L) 135 - 145 meq/L    Potassium 3.9 3.5 - 5.2 meq/L    Chloride 100 98 - 111 meq/L    CO2 28 23 - 33 meq/L    Calcium 9.6 8.5 - 10.5 mg/dL    AST 20 5 - 40 U/L    Alkaline Phosphatase 60 38 - 126 U/L    Total Protein 7.3 6.1 - 8.0 g/dL    Albumin 4.4 3.5 - 5.1 g/dL    Total Bilirubin <0.2 (L) 0.3 - 1.2 mg/dL    ALT 13 11 - 66 U/L   Reticulocytes   Result Value Ref Range    Retic Ct Abs 3.7 (H) 0.5 - 2.0 %    Absolute Retic # 126.0 (H) 20.0 - 115.0 thou/mm3    Immature Retic Fract 28.4 (H) 3.0 - 15.9 %    Retic Hemoglobin 21.4 (L) 28.2 - 35.7 pg   Anion Gap   Result Value Ref Range    Anion Gap 6.0 (L) 8.0 - 16.0 meq/L   Osmolality   Result Value Ref Range    Osmolality Calc 271.2 (L) 275.0 - 300.0 mOsmol/kg   Glomerular Filtration Rate, Estimated   Result Value Ref Range    Est, Glom Filt Rate 34 (A) ml/min/1.73m2       EMERGENCY DEPARTMENT COURSE:   Vitals:    Vitals:    02/10/21 1901 02/10/21 1903 02/10/21 1917 02/10/21 1959   BP:  119/77     Pulse: 98 89 88 83   Resp:  16 16 17   Temp:  97.9 °F (36.6 °C)     TempSrc:  Oral     SpO2:  100% 100% 100%   Weight:       Height:         7:10 PM: Patient is seen and evaluated in a timely fashion.      ACTIONS:  Large bore IV  Tele monitor  None  Labs Reviewed   CBC WITH AUTO DIFFERENTIAL - Abnormal; Notable for the following components:       Result Value    RBC 3.30 (*)     Hemoglobin 8.3 (*)     Hematocrit 27.4 (*)     MCH 25.2 (*)     MCHC 30.3 (*)     All other components within normal limits   COMPREHENSIVE METABOLIC PANEL - Abnormal; Notable for the following components:    CREATININE 1.7 (*)     Sodium 134 (*)     Total Bilirubin <0.2 (*)     All other components within normal limits   RETICULOCYTES - Abnormal; Notable for the following components:    Retic Ct Abs 3.7 (*)     Absolute Retic # 126.0 (*)     Immature Retic Fract 28.4 (*)     Retic Hemoglobin 21.4 (*)     All other components within normal limits   ANION GAP - Abnormal; Notable for the following components:    Anion Gap 6.0 (*)     All other components within normal limits   OSMOLALITY - Abnormal; Notable for the following components:    Osmolality Calc 271.2 (*)     All other components within normal limits   GLOMERULAR FILTRATION RATE, ESTIMATED - Abnormal; Notable for the following components:    Est, Glom Filt Rate 34 (*)     All other components within normal limits     Medications   0.9 % sodium chloride bolus (0 mLs Intravenous Stopped 2/10/21 2023)       MEDICAL DECISION MAKINGS:    Vital signs are stable. No orthostatic tachycardia or hypotension. Patient has no tachycardia in ED. Patient's palpitation seems to be mixture of anxiety and slight demanding palpitation. Hemoglobin today 8.3, (5 days ago 8.7). Other labs were reassuring. Patient is reassured and discharged in stable conditions. She has a scheduled iron transfusion with Dr. Venus Gonzalez this coming Friday on 2/12/2021. CRITICAL CARE:   None    CONSULTS:  None    PROCEDURES:  None    FINAL IMPRESSION      1. Chronic anemia    2. Ulcerative colitis without complications, unspecified location (HCC)    3. Palpitations    4. Hemorrhoids, unspecified hemorrhoid type    5.  Rectal/anal hemorrhage DISPOSITION/PLAN   Discharge home    PATIENT REFERRED TO:  Katie Pruett MD  [facility] [address] [facility]  [phone]    In 2 days  as scheduled.       DISCHARGE MEDICATIONS:  Discharge Medication List as of 2/10/2021  8:48 PM          (Please note that portions of this note were completed with a voice recognition program.  Efforts were made to edit the dictations but occasionally words aremis-transcribed.)    MD Frank Shaffer MD  02/10/21 2633

## 2021-02-11 NOTE — ED NOTES
Pt to rm 16 per intake states she needs an iron infusion. Reports hx of rectal bleeding for 3 years, nothing new or different today. States her PCP sent her in because they have been rescheduling her infusions and she needs it d/t abnormal hgb a week ago.  Pt appears in no acute distress, VSS, family at bedside     Cookie Ambrosio RN  02/10/21 1914

## 2021-02-12 ENCOUNTER — HOSPITAL ENCOUNTER (OUTPATIENT)
Dept: INFUSION THERAPY | Age: 36
Discharge: HOME | End: 2021-02-12
Payer: COMMERCIAL

## 2021-02-12 VITALS
OXYGEN SATURATION: 100 % | DIASTOLIC BLOOD PRESSURE: 56 MMHG | SYSTOLIC BLOOD PRESSURE: 120 MMHG | HEART RATE: 77 BPM | TEMPERATURE: 98.5 F | WEIGHT: 169.4 LBS | RESPIRATION RATE: 16 BRPM | HEIGHT: 65 IN | BODY MASS INDEX: 28.22 KG/M2

## 2021-02-12 DIAGNOSIS — D50.0 CHRONIC BLOOD LOSS ANEMIA: ICD-10-CM

## 2021-02-12 DIAGNOSIS — R06.02 SHORTNESS OF BREATH: ICD-10-CM

## 2021-02-12 DIAGNOSIS — R53.83 OTHER FATIGUE: ICD-10-CM

## 2021-02-12 DIAGNOSIS — D50.0 IRON DEFICIENCY ANEMIA DUE TO CHRONIC BLOOD LOSS: Primary | ICD-10-CM

## 2021-02-12 DIAGNOSIS — K51.011 ULCERATIVE PANCOLITIS WITH RECTAL BLEEDING (HCC): ICD-10-CM

## 2021-02-12 PROCEDURE — 2580000003 HC RX 258: Performed by: PHYSICIAN ASSISTANT

## 2021-02-12 PROCEDURE — 96366 THER/PROPH/DIAG IV INF ADDON: CPT

## 2021-02-12 PROCEDURE — 6360000002 HC RX W HCPCS: Performed by: PHYSICIAN ASSISTANT

## 2021-02-12 PROCEDURE — 96365 THER/PROPH/DIAG IV INF INIT: CPT

## 2021-02-12 RX ORDER — DIPHENHYDRAMINE HYDROCHLORIDE 50 MG/ML
50 INJECTION INTRAMUSCULAR; INTRAVENOUS ONCE
Status: CANCELLED | OUTPATIENT
Start: 2021-02-19 | End: 2021-02-19

## 2021-02-12 RX ORDER — SODIUM CHLORIDE 9 MG/ML
INJECTION, SOLUTION INTRAVENOUS CONTINUOUS
Status: CANCELLED | OUTPATIENT
Start: 2021-02-19

## 2021-02-12 RX ORDER — HEPARIN SODIUM (PORCINE) LOCK FLUSH IV SOLN 100 UNIT/ML 100 UNIT/ML
500 SOLUTION INTRAVENOUS PRN
Status: CANCELLED | OUTPATIENT
Start: 2021-02-19

## 2021-02-12 RX ORDER — MEPERIDINE HYDROCHLORIDE 25 MG/ML
12.5 INJECTION INTRAMUSCULAR; INTRAVENOUS; SUBCUTANEOUS ONCE
Status: CANCELLED | OUTPATIENT
Start: 2021-02-19 | End: 2021-02-19

## 2021-02-12 RX ORDER — METHYLPREDNISOLONE SODIUM SUCCINATE 125 MG/2ML
125 INJECTION, POWDER, LYOPHILIZED, FOR SOLUTION INTRAMUSCULAR; INTRAVENOUS ONCE
Status: CANCELLED | OUTPATIENT
Start: 2021-02-19 | End: 2021-02-19

## 2021-02-12 RX ORDER — SODIUM CHLORIDE 0.9 % (FLUSH) 0.9 %
10 SYRINGE (ML) INJECTION PRN
Status: CANCELLED | OUTPATIENT
Start: 2021-02-19

## 2021-02-12 RX ORDER — SODIUM CHLORIDE 9 MG/ML
INJECTION, SOLUTION INTRAVENOUS CONTINUOUS
Status: ACTIVE | OUTPATIENT
Start: 2021-02-12 | End: 2021-02-12

## 2021-02-12 RX ORDER — SODIUM CHLORIDE 0.9 % (FLUSH) 0.9 %
5 SYRINGE (ML) INJECTION PRN
Status: CANCELLED | OUTPATIENT
Start: 2021-02-19

## 2021-02-12 RX ADMIN — IRON SUCROSE 300 MG: 20 INJECTION, SOLUTION INTRAVENOUS at 11:55

## 2021-02-12 RX ADMIN — SODIUM CHLORIDE: 9 INJECTION, SOLUTION INTRAVENOUS at 11:55

## 2021-02-12 NOTE — PROGRESS NOTES
Patient assessed for the following post Venofer:    Dizziness   No  Lightheadedness  No      Acute nausea/vomiting No  Headache   No  Chest pain/pressure  No  Rash/itching   No  Shortness of breath  No    Patient kept for 20 minutes observation post infusion Venofer. Patient tolerated irontreatment without any complications. Last vital signs:   /80   Pulse 80   Temp 96.8 °F (36 °C) (Infrared)   Resp 16   Ht 5' 5\" (1.651 m)   Wt 169 lb 6.4 oz (76.8 kg)   SpO2 100%   BMI 28.19 kg/m²     Patient instructed if experience any of the above symptoms following today's infusion,he/she is to notify MD immediately or go to the emergency department. Discharge instructions given to patient. Verbalizes understanding. Ambulated off unit per self, with belongings.

## 2021-02-12 NOTE — PROGRESS NOTES
Venofer reviewed, patient verbalizes understanding of medication being administered and potential side effects.

## 2021-02-19 ENCOUNTER — HOSPITAL ENCOUNTER (OUTPATIENT)
Dept: INFUSION THERAPY | Age: 36
Discharge: HOME | End: 2021-02-19
Payer: COMMERCIAL

## 2021-02-19 VITALS
HEART RATE: 79 BPM | DIASTOLIC BLOOD PRESSURE: 61 MMHG | HEIGHT: 65 IN | OXYGEN SATURATION: 100 % | SYSTOLIC BLOOD PRESSURE: 100 MMHG | BODY MASS INDEX: 29.06 KG/M2 | TEMPERATURE: 98.3 F | WEIGHT: 174.4 LBS | RESPIRATION RATE: 18 BRPM

## 2021-02-19 DIAGNOSIS — R06.02 SHORTNESS OF BREATH: ICD-10-CM

## 2021-02-19 DIAGNOSIS — R53.83 OTHER FATIGUE: ICD-10-CM

## 2021-02-19 DIAGNOSIS — D50.0 IRON DEFICIENCY ANEMIA DUE TO CHRONIC BLOOD LOSS: Primary | ICD-10-CM

## 2021-02-19 DIAGNOSIS — D50.0 CHRONIC BLOOD LOSS ANEMIA: ICD-10-CM

## 2021-02-19 DIAGNOSIS — K51.011 ULCERATIVE PANCOLITIS WITH RECTAL BLEEDING (HCC): ICD-10-CM

## 2021-02-19 PROCEDURE — 96365 THER/PROPH/DIAG IV INF INIT: CPT

## 2021-02-19 PROCEDURE — 96366 THER/PROPH/DIAG IV INF ADDON: CPT

## 2021-02-19 PROCEDURE — 2580000003 HC RX 258: Performed by: PHYSICIAN ASSISTANT

## 2021-02-19 PROCEDURE — 6360000002 HC RX W HCPCS: Performed by: PHYSICIAN ASSISTANT

## 2021-02-19 RX ORDER — SODIUM CHLORIDE 9 MG/ML
INJECTION, SOLUTION INTRAVENOUS CONTINUOUS
Status: DISCONTINUED | OUTPATIENT
Start: 2021-02-19 | End: 2021-02-20 | Stop reason: HOSPADM

## 2021-02-19 RX ORDER — SODIUM CHLORIDE 9 MG/ML
INJECTION, SOLUTION INTRAVENOUS CONTINUOUS
Status: CANCELLED | OUTPATIENT
Start: 2021-02-26

## 2021-02-19 RX ORDER — DIPHENHYDRAMINE HYDROCHLORIDE 50 MG/ML
50 INJECTION INTRAMUSCULAR; INTRAVENOUS ONCE
Status: CANCELLED | OUTPATIENT
Start: 2021-02-26 | End: 2021-02-26

## 2021-02-19 RX ORDER — SODIUM CHLORIDE 0.9 % (FLUSH) 0.9 %
5 SYRINGE (ML) INJECTION PRN
Status: CANCELLED | OUTPATIENT
Start: 2021-02-26

## 2021-02-19 RX ORDER — HEPARIN SODIUM (PORCINE) LOCK FLUSH IV SOLN 100 UNIT/ML 100 UNIT/ML
500 SOLUTION INTRAVENOUS PRN
Status: CANCELLED | OUTPATIENT
Start: 2021-02-26

## 2021-02-19 RX ORDER — SODIUM CHLORIDE 0.9 % (FLUSH) 0.9 %
10 SYRINGE (ML) INJECTION PRN
Status: CANCELLED | OUTPATIENT
Start: 2021-02-26

## 2021-02-19 RX ORDER — MEPERIDINE HYDROCHLORIDE 25 MG/ML
12.5 INJECTION INTRAMUSCULAR; INTRAVENOUS; SUBCUTANEOUS ONCE
Status: CANCELLED | OUTPATIENT
Start: 2021-02-26 | End: 2021-02-26

## 2021-02-19 RX ORDER — METHYLPREDNISOLONE SODIUM SUCCINATE 125 MG/2ML
125 INJECTION, POWDER, LYOPHILIZED, FOR SOLUTION INTRAMUSCULAR; INTRAVENOUS ONCE
Status: CANCELLED | OUTPATIENT
Start: 2021-02-26 | End: 2021-02-26

## 2021-02-19 RX ADMIN — IRON SUCROSE 300 MG: 20 INJECTION, SOLUTION INTRAVENOUS at 11:58

## 2021-02-19 RX ADMIN — SODIUM CHLORIDE: 9 INJECTION, SOLUTION INTRAVENOUS at 11:57

## 2021-02-19 NOTE — PROGRESS NOTES
Patient assessed for the following post Venofer:    Dizziness   No  Lightheadedness  No      Acute nausea/vomiting No  Headache   No  Chest pain/pressure  No  Rash/itching   No  Shortness of breath  No    Patient kept for 20 minutes observation post infusion Venofer. Patient tolerated iron treatment Venofer without any complications. Last vital signs:   /61   Pulse 79   Temp 98.3 °F (36.8 °C) (Oral)   Resp 18   Ht 5' 5\" (1.651 m)   Wt 174 lb 6.4 oz (79.1 kg)   SpO2 100%   BMI 29.02 kg/m²     Patient instructed if experience any of the above symptoms following today's infusion, she is to notify MD immediately or go to the emergency department. Discharge instructions given to patient. Verbalizes understanding. Ambulated off unit per self, with belongings.

## 2021-02-19 NOTE — PLAN OF CARE
Care plan reviewed with patient. Patient verbalized understanding of the plan of care and contribute to goal setting. Problem: Intellectual/Education/Knowledge Deficit  Goal: Teaching initiated upon admission  2/19/2021 1313 by Philly Loaiza RN  Outcome: Met This Shift  2/19/2021 1311 by Philly Loaiza RN  Note: Patient verbalizes understanding to verbal information given on venofer,action and possible side effects. Aware to call MD if develop complications. Intervention: Verbal/written education provided  Note: Discuss venofer     Problem: Discharge Planning  Goal: Knowledge of discharge instructions  Description: Knowledge of discharge instructions     2/19/2021 1313 by Philly Loaiza RN  Outcome: Met This Shift  2/19/2021 1311 by Philly Loaiza RN  Note: Verbalized understanding of discharge instructions, follow ups and when to call doctor   Intervention: Discharge to appropriate level of care  Note: Discuss discharge instructions, follow ups and when to call doctor. Problem: Falls - Risk of:  Goal: Will remain free from falls  Description: Will remain free from falls  2/19/2021 1313 by Philly Loaiza RN  Outcome: Met This Shift  2/19/2021 1311 by Philly Loaiza RN  Note: Free from falls while in infusion center. Verbalized understanding of fall prevention and to ask for assistance with ambulation   2/19/2021 1227 by Philly Loaiza RN  Note: Free from falls while in infusion center.  Verbalized understanding of fall prevention and to ask for assistance with ambulation   Intervention: Assess risk factors for falls  Description: Assess risk factors for falls  2/19/2021 1311 by Philly Loaiza RN  Note: Assess for fall risk, instruct to ask for assistance with ambulation   2/19/2021 1227 by Philly Loaiza RN  Note: Assess for fall risk, instruct to ask for assistance with ambulation      Problem: Infection - Central Venous Catheter-Associated Bloodstream Infection:  Goal: Will show no infection signs and

## 2021-02-26 ENCOUNTER — TELEPHONE (OUTPATIENT)
Dept: ONCOLOGY | Age: 36
End: 2021-02-26

## 2021-02-26 NOTE — TELEPHONE ENCOUNTER
Mariya is scheduled for her third Venofer infusion today but she woke up with a cough and sore throat. No fever. She's not sure if she should still come in today or reschedule for Monday. Please advise.

## 2021-03-12 ENCOUNTER — HOSPITAL ENCOUNTER (OUTPATIENT)
Dept: INFUSION THERAPY | Age: 36
Discharge: HOME | End: 2021-03-12
Payer: COMMERCIAL

## 2021-03-12 VITALS
SYSTOLIC BLOOD PRESSURE: 100 MMHG | TEMPERATURE: 97.9 F | RESPIRATION RATE: 18 BRPM | OXYGEN SATURATION: 99 % | HEART RATE: 80 BPM | DIASTOLIC BLOOD PRESSURE: 58 MMHG | WEIGHT: 167 LBS | HEIGHT: 65 IN | BODY MASS INDEX: 27.82 KG/M2

## 2021-03-12 DIAGNOSIS — R06.02 SHORTNESS OF BREATH: ICD-10-CM

## 2021-03-12 DIAGNOSIS — K51.011 ULCERATIVE PANCOLITIS WITH RECTAL BLEEDING (HCC): ICD-10-CM

## 2021-03-12 DIAGNOSIS — D50.0 CHRONIC BLOOD LOSS ANEMIA: ICD-10-CM

## 2021-03-12 DIAGNOSIS — D50.0 IRON DEFICIENCY ANEMIA DUE TO CHRONIC BLOOD LOSS: Primary | ICD-10-CM

## 2021-03-12 DIAGNOSIS — R53.83 OTHER FATIGUE: ICD-10-CM

## 2021-03-12 PROCEDURE — 6360000002 HC RX W HCPCS: Performed by: PHYSICIAN ASSISTANT

## 2021-03-12 PROCEDURE — 96366 THER/PROPH/DIAG IV INF ADDON: CPT

## 2021-03-12 PROCEDURE — 2580000003 HC RX 258: Performed by: PHYSICIAN ASSISTANT

## 2021-03-12 PROCEDURE — 96365 THER/PROPH/DIAG IV INF INIT: CPT

## 2021-03-12 RX ORDER — MEPERIDINE HYDROCHLORIDE 25 MG/ML
12.5 INJECTION INTRAMUSCULAR; INTRAVENOUS; SUBCUTANEOUS ONCE
Status: CANCELLED | OUTPATIENT
Start: 2021-03-19 | End: 2021-03-19

## 2021-03-12 RX ORDER — SODIUM CHLORIDE 0.9 % (FLUSH) 0.9 %
10 SYRINGE (ML) INJECTION PRN
Status: CANCELLED | OUTPATIENT
Start: 2021-03-19

## 2021-03-12 RX ORDER — SODIUM CHLORIDE 9 MG/ML
INJECTION, SOLUTION INTRAVENOUS CONTINUOUS
Status: CANCELLED | OUTPATIENT
Start: 2021-03-19

## 2021-03-12 RX ORDER — DIPHENHYDRAMINE HYDROCHLORIDE 50 MG/ML
50 INJECTION INTRAMUSCULAR; INTRAVENOUS ONCE
Status: CANCELLED | OUTPATIENT
Start: 2021-03-19 | End: 2021-03-19

## 2021-03-12 RX ORDER — SODIUM CHLORIDE 9 MG/ML
INJECTION, SOLUTION INTRAVENOUS CONTINUOUS
Status: ACTIVE | OUTPATIENT
Start: 2021-03-12 | End: 2021-03-12

## 2021-03-12 RX ORDER — METHYLPREDNISOLONE SODIUM SUCCINATE 125 MG/2ML
125 INJECTION, POWDER, LYOPHILIZED, FOR SOLUTION INTRAMUSCULAR; INTRAVENOUS ONCE
Status: CANCELLED | OUTPATIENT
Start: 2021-03-19 | End: 2021-03-19

## 2021-03-12 RX ORDER — HEPARIN SODIUM (PORCINE) LOCK FLUSH IV SOLN 100 UNIT/ML 100 UNIT/ML
500 SOLUTION INTRAVENOUS PRN
Status: CANCELLED | OUTPATIENT
Start: 2021-03-19

## 2021-03-12 RX ORDER — SODIUM CHLORIDE 0.9 % (FLUSH) 0.9 %
5 SYRINGE (ML) INJECTION PRN
Status: CANCELLED | OUTPATIENT
Start: 2021-03-19

## 2021-03-12 RX ADMIN — SODIUM CHLORIDE: 9 INJECTION, SOLUTION INTRAVENOUS at 11:04

## 2021-03-12 RX ADMIN — IRON SUCROSE 300 MG: 20 INJECTION, SOLUTION INTRAVENOUS at 11:08

## 2021-03-12 NOTE — PROGRESS NOTES
Patient assessed for the following post venofer:    Dizziness   No  Lightheadedness  No      Acute nausea/vomiting No  Headache   No  Chest pain/pressure  No  Rash/itching   No  Shortness of breath  No    Patient kept for 20 minutes observation post infusion. Patient tolerated venofer without any complications. Last vital signs:   BP (!) 100/58   Pulse 80   Temp 97.9 °F (36.6 °C) (Oral)   Resp 18   Ht 5' 5\" (1.651 m)   Wt 167 lb (75.8 kg)   SpO2 99%   BMI 27.79 kg/m²         Patient instructed if experience any of the above symptoms following today's infusion,he/she is to notify MD immediately or go to the emergency department. Discharge instructions given to patient. Verbalizes understanding. Ambulated off unit per self with belongings.

## 2021-03-12 NOTE — PLAN OF CARE
Care plan reviewed with patient. Patient verbalized understanding of the plan of care and contribute to goal setting. Problem: Intellectual/Education/Knowledge Deficit  Goal: Teaching initiated upon admission  Outcome: Met This Shift  Note: Patient verbalizes understanding to verbal information given on venofer,action and possible side effects. Aware to call MD if develop complications. Intervention: Verbal/written education provided  Note: DIscuss venofer     Problem: Discharge Planning  Goal: Knowledge of discharge instructions  Description: Knowledge of discharge instructions     Outcome: Met This Shift  Note: Verbalized understanding of discharge instructions, follow ups and when to call doctor   Intervention: Discharge to appropriate level of care  Note: Discuss discharge instructions, follow ups and when to call doctor. Problem: Falls - Risk of:  Goal: Will remain free from falls  Description: Will remain free from falls  Outcome: Met This Shift  Note: Free from falls while in infusion center. Verbalized understanding of fall prevention and to ask for assistance with ambulation   Intervention: Assess risk factors for falls  Description: Assess risk factors for falls  Note: Assess for fall risk, instruct to ask for assistance with ambulation      Problem: Infection - Central Venous Catheter-Associated Bloodstream Infection:  Goal: Will show no infection signs and symptoms  Description: Will show no infection signs and symptoms  Outcome: Met This Shift  Note: Mediport site with no redness or warmth. Skin over port intact with no signs of breakdown noted. Patient verbalizes signs/symptoms of port infection and when to notify the physician.     Intervention: Infection risk assessment  Description: Infection risk assessment  Note: Discuss port maintenance, infection prevention, signs and when to call

## 2022-03-04 ENCOUNTER — APPOINTMENT (OUTPATIENT)
Dept: CT IMAGING | Age: 37
End: 2022-03-04
Payer: COMMERCIAL

## 2022-03-04 ENCOUNTER — HOSPITAL ENCOUNTER (EMERGENCY)
Age: 37
Discharge: HOME | End: 2022-03-05
Attending: EMERGENCY MEDICINE
Payer: COMMERCIAL

## 2022-03-04 DIAGNOSIS — F07.81 POSTCONCUSSIVE SYNDROME: ICD-10-CM

## 2022-03-04 DIAGNOSIS — M54.2 CERVICALGIA: ICD-10-CM

## 2022-03-04 DIAGNOSIS — R42 DIZZINESS: Primary | ICD-10-CM

## 2022-03-04 DIAGNOSIS — S09.90XA CLOSED HEAD INJURY, INITIAL ENCOUNTER: ICD-10-CM

## 2022-03-04 LAB
ALBUMIN SERPL-MCNC: 3.7 GM/DL (ref 3.4–5)
ALP BLD-CCNC: 66 U/L (ref 46–116)
ALT SERPL-CCNC: 16 U/L (ref 14–63)
ANION GAP: 5 MEQ/L (ref 8–16)
AST SERPL-CCNC: 17 U/L (ref 15–37)
BASOPHILS # BLD: 0.6 % (ref 0–3)
BILIRUB SERPL-MCNC: 0.3 MG/DL (ref 0.2–1)
BUN BLDV-MCNC: 16 MG/DL (ref 7–18)
CHLORIDE BLD-SCNC: 106 MEQ/L (ref 98–107)
CO2: 30 MEQ/L (ref 21–32)
CREAT SERPL-MCNC: 1.2 MG/DL (ref 0.6–1.3)
EOSINOPHILS RELATIVE PERCENT: 8 % (ref 0–4)
GFR, ESTIMATED: 54 ML/MIN/1.73M2
GLUCOSE BLD-MCNC: 91 MG/DL (ref 74–106)
HCT VFR BLD CALC: 37.8 % (ref 37–47)
HEMOGLOBIN: 12.4 GM/DL (ref 12–16)
LYMPHOCYTES # BLD: 41.5 % (ref 15–47)
MCH RBC QN AUTO: 28.9 PG (ref 27–31)
MCHC RBC AUTO-ENTMCNC: 32.9 GM/DL (ref 33–37)
MCV RBC AUTO: 87.9 FL (ref 81–99)
MONOCYTES: 9.8 % (ref 0–12)
PDW BLD-RTO: 15 % (ref 11.5–14.5)
PLATELET # BLD: 328 THOU/MM3 (ref 130–400)
PMV BLD AUTO: 8.1 FL (ref 7.4–10.4)
POC CALCIUM: 9 MG/DL (ref 8.5–10.1)
POTASSIUM SERPL-SCNC: 3.9 MEQ/L (ref 3.5–5.1)
PREGNANCY, SERUM: NEGATIVE
RBC # BLD: 4.3 MILL/MM3 (ref 4.2–5.4)
SEGS: 40.1 % (ref 43–75)
SODIUM BLD-SCNC: 141 MEQ/L (ref 136–145)
TOTAL PROTEIN: 7.5 GM/DL (ref 6.4–8.2)
WBC # BLD: 6.9 THOU/MM3 (ref 4.8–10.8)

## 2022-03-04 PROCEDURE — 85025 COMPLETE CBC W/AUTO DIFF WBC: CPT

## 2022-03-04 PROCEDURE — 70450 CT HEAD/BRAIN W/O DYE: CPT

## 2022-03-04 PROCEDURE — 80053 COMPREHEN METABOLIC PANEL: CPT

## 2022-03-04 PROCEDURE — 6360000002 HC RX W HCPCS: Performed by: EMERGENCY MEDICINE

## 2022-03-04 PROCEDURE — 6370000000 HC RX 637 (ALT 250 FOR IP): Performed by: EMERGENCY MEDICINE

## 2022-03-04 PROCEDURE — 2580000003 HC RX 258: Performed by: EMERGENCY MEDICINE

## 2022-03-04 PROCEDURE — 96374 THER/PROPH/DIAG INJ IV PUSH: CPT

## 2022-03-04 PROCEDURE — 72125 CT NECK SPINE W/O DYE: CPT

## 2022-03-04 PROCEDURE — 84703 CHORIONIC GONADOTROPIN ASSAY: CPT

## 2022-03-04 PROCEDURE — 99285 EMERGENCY DEPT VISIT HI MDM: CPT

## 2022-03-04 RX ORDER — MECLIZINE HYDROCHLORIDE CHEWABLE TABLETS 25 MG/1
25 TABLET, CHEWABLE ORAL ONCE
Status: COMPLETED | OUTPATIENT
Start: 2022-03-04 | End: 2022-03-04

## 2022-03-04 RX ORDER — 0.9 % SODIUM CHLORIDE 0.9 %
1000 INTRAVENOUS SOLUTION INTRAVENOUS ONCE
Status: COMPLETED | OUTPATIENT
Start: 2022-03-04 | End: 2022-03-05

## 2022-03-04 RX ORDER — ONDANSETRON 2 MG/ML
4 INJECTION INTRAMUSCULAR; INTRAVENOUS ONCE
Status: COMPLETED | OUTPATIENT
Start: 2022-03-04 | End: 2022-03-04

## 2022-03-04 RX ADMIN — MECLIZINE HYDROCHLORIDE 25 MG: 25 TABLET, CHEWABLE ORAL at 23:14

## 2022-03-04 RX ADMIN — ONDANSETRON 4 MG: 2 INJECTION INTRAMUSCULAR; INTRAVENOUS at 23:14

## 2022-03-04 RX ADMIN — SODIUM CHLORIDE 1000 ML: 9 INJECTION, SOLUTION INTRAVENOUS at 23:14

## 2022-03-04 ASSESSMENT — PAIN SCALES - GENERAL: PAINLEVEL_OUTOF10: 4

## 2022-03-04 ASSESSMENT — PAIN DESCRIPTION - PAIN TYPE: TYPE: ACUTE PAIN

## 2022-03-04 ASSESSMENT — PAIN DESCRIPTION - DESCRIPTORS: DESCRIPTORS: ACHING

## 2022-03-05 VITALS
DIASTOLIC BLOOD PRESSURE: 67 MMHG | BODY MASS INDEX: 21.66 KG/M2 | RESPIRATION RATE: 18 BRPM | TEMPERATURE: 97.1 F | HEIGHT: 65 IN | OXYGEN SATURATION: 100 % | HEART RATE: 60 BPM | SYSTOLIC BLOOD PRESSURE: 133 MMHG | WEIGHT: 130 LBS

## 2022-03-05 RX ORDER — ONDANSETRON 4 MG/1
4 TABLET, ORALLY DISINTEGRATING ORAL EVERY 8 HOURS PRN
Qty: 15 TABLET | Refills: 0 | Status: SHIPPED | OUTPATIENT
Start: 2022-03-05 | End: 2022-09-19 | Stop reason: SDUPTHER

## 2022-03-05 RX ORDER — MECLIZINE HYDROCHLORIDE 25 MG/1
25 TABLET ORAL 3 TIMES DAILY PRN
Qty: 15 TABLET | Refills: 0 | Status: SHIPPED | OUTPATIENT
Start: 2022-03-05 | End: 2022-09-19
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
## 2022-03-05 NOTE — ED NOTES
Pt stable A&O x 3 given discharge and follow up info. Pt voiced no concerns and discharged from ER to self to home. Pt ambulated out of ER with no complications .        Keith Lujan RN  03/05/22 6015

## 2022-04-28 PROBLEM — N12 PYELONEPHRITIS: Status: ACTIVE | Noted: 2022-04-28

## 2022-09-04 NOTE — CONSULTS
Consult acknowledge patient will be seen today
histoplasmosis in the past.  We do not want to use  biological agents on her and this should be tapered fast.  5.  The patient does not want to have endoscopy in the hospital.  She  would like that to be done as an outpatient. Plan to give her IV  infusions of iron at transfusions if stable to be discharged arranged  soon to have an upper endoscopy as well as colonoscopy done as an  outpatient. Duodenal biopsy obtained. Random biopsies needs to  be done tomorrow to evaluate the activity of the disease. 6.  I would like the patient to start on Canasa rectal suppository or  enema as the patient according to the record has C. difficile colitis. Plan is to stay on Lialda at this time. 7.  Avoid biological agents. Thank you for allowing me to participate in this patient's care.         Sofi Munoz M.D.    D: 11/30/2018 20:41:16       T: 11/30/2018 21:48:53     [email or web address]  Job#: [id number]     Doc#: [id number]    CC:  Hermann Siddiqui M.D.
no

## 2022-09-17 PROBLEM — Z87.19 HISTORY OF GI BLEED: Status: ACTIVE | Noted: 2022-09-17

## 2022-09-17 PROBLEM — F41.9 ANXIETY: Status: ACTIVE | Noted: 2022-09-17

## 2022-09-17 PROBLEM — G43.909 MIGRAINE: Status: ACTIVE | Noted: 2022-09-17

## 2022-12-19 DIAGNOSIS — D50.0 IRON DEFICIENCY ANEMIA DUE TO CHRONIC BLOOD LOSS: Primary | ICD-10-CM

## 2023-03-30 ENCOUNTER — TELEPHONE (OUTPATIENT)
Dept: ONCOLOGY | Age: 38
End: 2023-03-30
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
## 2023-03-31 ENCOUNTER — HOSPITAL ENCOUNTER (OUTPATIENT)
Dept: INFUSION THERAPY | Age: 38
Discharge: HOME | End: 2023-03-31
Payer: COMMERCIAL

## 2023-03-31 VITALS
HEART RATE: 87 BPM | OXYGEN SATURATION: 100 % | DIASTOLIC BLOOD PRESSURE: 88 MMHG | SYSTOLIC BLOOD PRESSURE: 130 MMHG | RESPIRATION RATE: 18 BRPM | TEMPERATURE: 97.3 F

## 2023-03-31 DIAGNOSIS — D50.0 IRON DEFICIENCY ANEMIA DUE TO CHRONIC BLOOD LOSS: ICD-10-CM

## 2023-03-31 LAB
ABSOLUTE IMMATURE GRANULOCYTE: 0.01 THOU/MM3 (ref 0–0.07)
BASOPHILS ABSOLUTE: 0.1 THOU/MM3 (ref 0–0.1)
BASOPHILS NFR BLD AUTO: 1 % (ref 0–3)
EOSINOPHIL NFR BLD AUTO: 16 % (ref 0–4)
EOSINOPHILS ABSOLUTE: 1.2 THOU/MM3 (ref 0–0.4)
ERYTHROCYTE [DISTWIDTH] IN BLOOD BY AUTOMATED COUNT: 15.2 % (ref 11.5–14.5)
FERRITIN SERPL IA-MCNC: 17 NG/ML (ref 10–291)
HCT VFR BLD AUTO: 30.3 % (ref 37–47)
HGB BLD-MCNC: 9.8 GM/DL (ref 12–16)
IMMATURE GRANULOCYTES: 0 %
IRON SERPL-MCNC: 36 UG/DL (ref 50–170)
LYMPHOCYTES ABSOLUTE: 1.3 THOU/MM3 (ref 1–4.8)
LYMPHOCYTES NFR BLD AUTO: 18 % (ref 15–47)
MCH RBC QN AUTO: 28.1 PG (ref 26–33)
MCHC RBC AUTO-ENTMCNC: 32.3 GM/DL (ref 32.2–35.5)
MCV RBC AUTO: 87 FL (ref 81–99)
MONOCYTES ABSOLUTE: 0.5 THOU/MM3 (ref 0.4–1.3)
MONOCYTES NFR BLD AUTO: 7 % (ref 0–12)
NEUTROPHILS NFR BLD AUTO: 60 % (ref 43–75)
PLATELET # BLD AUTO: 391 THOU/MM3 (ref 130–400)
PMV BLD AUTO: 9 FL (ref 9.4–12.4)
RBC # BLD AUTO: 3.49 MILL/MM3 (ref 4.2–5.4)
SEGMENTED NEUTROPHILS ABSOLUTE COUNT: 4.4 THOU/MM3 (ref 1.8–7.7)
TIBC SERPL-MCNC: 377 UG/DL (ref 171–450)
WBC # BLD AUTO: 7.4 THOU/MM3 (ref 4.8–10.8)

## 2023-03-31 PROCEDURE — 82728 ASSAY OF FERRITIN: CPT

## 2023-03-31 PROCEDURE — 83540 ASSAY OF IRON: CPT

## 2023-03-31 PROCEDURE — 85025 COMPLETE CBC W/AUTO DIFF WBC: CPT

## 2023-03-31 PROCEDURE — 83550 IRON BINDING TEST: CPT

## 2023-03-31 PROCEDURE — 99211 OFF/OP EST MAY X REQ PHY/QHP: CPT

## 2023-03-31 PROCEDURE — 36415 COLL VENOUS BLD VENIPUNCTURE: CPT

## 2023-04-07 ENCOUNTER — HOSPITAL ENCOUNTER (OUTPATIENT)
Dept: INFUSION THERAPY | Age: 38
Discharge: HOME | End: 2023-04-07
Payer: COMMERCIAL

## 2023-04-07 VITALS
TEMPERATURE: 98.6 F | RESPIRATION RATE: 16 BRPM | OXYGEN SATURATION: 98 % | SYSTOLIC BLOOD PRESSURE: 131 MMHG | HEART RATE: 90 BPM | DIASTOLIC BLOOD PRESSURE: 91 MMHG

## 2023-04-07 DIAGNOSIS — R53.83 OTHER FATIGUE: ICD-10-CM

## 2023-04-07 DIAGNOSIS — D50.0 IRON DEFICIENCY ANEMIA DUE TO CHRONIC BLOOD LOSS: Primary | ICD-10-CM

## 2023-04-07 DIAGNOSIS — R06.02 SHORTNESS OF BREATH: ICD-10-CM

## 2023-04-07 DIAGNOSIS — D50.0 CHRONIC BLOOD LOSS ANEMIA: ICD-10-CM

## 2023-04-07 DIAGNOSIS — K51.011 ULCERATIVE PANCOLITIS WITH RECTAL BLEEDING (HCC): ICD-10-CM

## 2023-04-07 PROCEDURE — 2580000003 HC RX 258: Performed by: PHYSICIAN ASSISTANT

## 2023-04-07 PROCEDURE — 96365 THER/PROPH/DIAG IV INF INIT: CPT

## 2023-04-07 PROCEDURE — 6360000002 HC RX W HCPCS: Performed by: PHYSICIAN ASSISTANT

## 2023-04-07 PROCEDURE — 96366 THER/PROPH/DIAG IV INF ADDON: CPT

## 2023-04-07 RX ORDER — SODIUM CHLORIDE 9 MG/ML
INJECTION, SOLUTION INTRAVENOUS CONTINUOUS
OUTPATIENT
Start: 2023-04-14

## 2023-04-07 RX ORDER — HEPARIN SODIUM (PORCINE) LOCK FLUSH IV SOLN 100 UNIT/ML 100 UNIT/ML
500 SOLUTION INTRAVENOUS PRN
OUTPATIENT
Start: 2023-04-14

## 2023-04-07 RX ORDER — ONDANSETRON 2 MG/ML
8 INJECTION INTRAMUSCULAR; INTRAVENOUS
OUTPATIENT
Start: 2023-04-14

## 2023-04-07 RX ORDER — SODIUM CHLORIDE 9 MG/ML
5-250 INJECTION, SOLUTION INTRAVENOUS PRN
Status: DISCONTINUED | OUTPATIENT
Start: 2023-04-07 | End: 2023-04-08 | Stop reason: HOSPADM

## 2023-04-07 RX ORDER — ALBUTEROL SULFATE 90 UG/1
4 AEROSOL, METERED RESPIRATORY (INHALATION) PRN
OUTPATIENT
Start: 2023-04-14

## 2023-04-07 RX ORDER — SODIUM CHLORIDE 0.9 % (FLUSH) 0.9 %
5-40 SYRINGE (ML) INJECTION PRN
OUTPATIENT
Start: 2023-04-14

## 2023-04-07 RX ORDER — EPINEPHRINE 1 MG/ML
0.3 INJECTION, SOLUTION INTRAMUSCULAR; SUBCUTANEOUS PRN
OUTPATIENT
Start: 2023-04-14

## 2023-04-07 RX ORDER — SODIUM CHLORIDE 9 MG/ML
5-250 INJECTION, SOLUTION INTRAVENOUS PRN
OUTPATIENT
Start: 2023-04-14

## 2023-04-07 RX ORDER — ACETAMINOPHEN 325 MG/1
650 TABLET ORAL
OUTPATIENT
Start: 2023-04-14

## 2023-04-07 RX ORDER — DIPHENHYDRAMINE HYDROCHLORIDE 50 MG/ML
50 INJECTION INTRAMUSCULAR; INTRAVENOUS
OUTPATIENT
Start: 2023-04-14

## 2023-04-07 RX ADMIN — IRON SUCROSE 300 MG: 20 INJECTION, SOLUTION INTRAVENOUS at 14:41

## 2023-04-07 RX ADMIN — SODIUM CHLORIDE 50 ML/HR: 9 INJECTION, SOLUTION INTRAVENOUS at 14:36

## 2023-04-07 NOTE — PROGRESS NOTES
Patient tolerated Venofer without any complications. Patient kept for 20 minuets observation post venofer infusion. Denies dizziness, lightheadedness, acute nausea or vomiting, headache, heart palpitations, rash/itching or increased SOB. Last vital signs  BP (!) 146/79   Pulse 78   Temp 98.7 °F (37.1 °C) (Oral)   Resp 16   LMP 03/18/2023   SpO2 95%     Patient instructed if they experience any of the above symptoms following today's visit, he/she is to notify the Physician or go to the Emergency Dept. Discharge instructions given to patient, Verbalizes understanding. Ambulated off unit per self in stable condition with all belongings.

## 2023-04-07 NOTE — PLAN OF CARE
Problem: Intellectual/Education/Knowledge Deficit  Goal: Teaching initiated upon admission  Outcome: Adequate for Discharge  Note: Patient verbalizes understanding to verbal information given on Venofer,action and possible side effects. Aware to call MD if develop complications. Intervention: Verbal/written education provided  Note: Discuss understanding to verbal information given on IV Venofer infusion. Problem: Discharge Planning  Goal: Knowledge of discharge instructions  Description: Knowledge of discharge instructions     Outcome: Adequate for Discharge  Note: Verbalize understanding of discharge instructions, follow up appointments, and when to call Physician. Intervention: Discharge to appropriate level of care  Note: Discuss understanding of discharge instructions, follow up appointments and when to call Physician. Problem: Falls - Risk of:  Goal: Will remain free from falls  Description: Will remain free from falls  Outcome: Adequate for Discharge  Note: Free from falls while in O.P. Oncology. Intervention: Assess risk factors for falls  Note: Discussed the need to use the call light for assistance when getting up to ambulate. Care plan reviewed with patient. Patient verbalize understanding of the plan of care and contribute to goal setting.

## 2023-04-17 ENCOUNTER — HOSPITAL ENCOUNTER (OUTPATIENT)
Dept: INFUSION THERAPY | Age: 38
Discharge: HOME | End: 2023-04-17
Payer: COMMERCIAL

## 2023-04-17 VITALS
RESPIRATION RATE: 16 BRPM | HEART RATE: 74 BPM | HEIGHT: 65 IN | DIASTOLIC BLOOD PRESSURE: 78 MMHG | BODY MASS INDEX: 24.99 KG/M2 | SYSTOLIC BLOOD PRESSURE: 131 MMHG | WEIGHT: 150 LBS | TEMPERATURE: 98 F | OXYGEN SATURATION: 100 %

## 2023-04-17 DIAGNOSIS — R06.02 SHORTNESS OF BREATH: ICD-10-CM

## 2023-04-17 DIAGNOSIS — D50.0 CHRONIC BLOOD LOSS ANEMIA: ICD-10-CM

## 2023-04-17 DIAGNOSIS — K51.011 ULCERATIVE PANCOLITIS WITH RECTAL BLEEDING (HCC): ICD-10-CM

## 2023-04-17 DIAGNOSIS — R53.83 OTHER FATIGUE: ICD-10-CM

## 2023-04-17 DIAGNOSIS — D50.0 IRON DEFICIENCY ANEMIA DUE TO CHRONIC BLOOD LOSS: Primary | ICD-10-CM

## 2023-04-17 PROCEDURE — 2580000003 HC RX 258: Performed by: PHYSICIAN ASSISTANT

## 2023-04-17 PROCEDURE — 96365 THER/PROPH/DIAG IV INF INIT: CPT

## 2023-04-17 PROCEDURE — 96366 THER/PROPH/DIAG IV INF ADDON: CPT

## 2023-04-17 PROCEDURE — 6360000002 HC RX W HCPCS: Performed by: PHYSICIAN ASSISTANT

## 2023-04-17 RX ORDER — SODIUM CHLORIDE 9 MG/ML
5-250 INJECTION, SOLUTION INTRAVENOUS PRN
Status: CANCELLED | OUTPATIENT
Start: 2023-04-21

## 2023-04-17 RX ORDER — SODIUM CHLORIDE 9 MG/ML
INJECTION, SOLUTION INTRAVENOUS CONTINUOUS
OUTPATIENT
Start: 2023-04-21

## 2023-04-17 RX ORDER — ACETAMINOPHEN 325 MG/1
650 TABLET ORAL
OUTPATIENT
Start: 2023-04-21

## 2023-04-17 RX ORDER — SODIUM CHLORIDE 9 MG/ML
5-250 INJECTION, SOLUTION INTRAVENOUS PRN
Status: DISCONTINUED | OUTPATIENT
Start: 2023-04-17 | End: 2023-04-18 | Stop reason: HOSPADM

## 2023-04-17 RX ORDER — HEPARIN SODIUM (PORCINE) LOCK FLUSH IV SOLN 100 UNIT/ML 100 UNIT/ML
500 SOLUTION INTRAVENOUS PRN
OUTPATIENT
Start: 2023-04-21

## 2023-04-17 RX ORDER — SODIUM CHLORIDE 9 MG/ML
5-250 INJECTION, SOLUTION INTRAVENOUS PRN
OUTPATIENT
Start: 2023-04-21

## 2023-04-17 RX ORDER — SODIUM CHLORIDE 0.9 % (FLUSH) 0.9 %
5-40 SYRINGE (ML) INJECTION PRN
OUTPATIENT
Start: 2023-04-21

## 2023-04-17 RX ORDER — ALBUTEROL SULFATE 90 UG/1
4 AEROSOL, METERED RESPIRATORY (INHALATION) PRN
OUTPATIENT
Start: 2023-04-21

## 2023-04-17 RX ORDER — ONDANSETRON 2 MG/ML
8 INJECTION INTRAMUSCULAR; INTRAVENOUS
OUTPATIENT
Start: 2023-04-21

## 2023-04-17 RX ORDER — DIPHENHYDRAMINE HYDROCHLORIDE 50 MG/ML
50 INJECTION INTRAMUSCULAR; INTRAVENOUS
OUTPATIENT
Start: 2023-04-21

## 2023-04-17 RX ORDER — EPINEPHRINE 1 MG/ML
0.3 INJECTION, SOLUTION INTRAMUSCULAR; SUBCUTANEOUS PRN
OUTPATIENT
Start: 2023-04-21

## 2023-04-17 RX ADMIN — SODIUM CHLORIDE 20 ML/HR: 9 INJECTION, SOLUTION INTRAVENOUS at 15:18

## 2023-04-17 RX ADMIN — IRON SUCROSE 300 MG: 20 INJECTION, SOLUTION INTRAVENOUS at 15:19

## 2023-04-17 NOTE — PLAN OF CARE
Problem: Intellectual/Education/Knowledge Deficit  Goal: Teaching initiated upon admission  Outcome: Adequate for Discharge  Note: Patient verbalizes understanding to verbal information given on venofer,action and possible side effects. Aware to call MD if develop complications. Intervention: Verbal/written education provided  Note: venofer reviewed, patient verbalizes understanding of medication being administered and potential side effects. Problem: Discharge Planning  Goal: Knowledge of discharge instructions  Description: Knowledge of discharge instructions     Outcome: Adequate for Discharge  Note: Verbalized understanding of discharge instructions, follow-up appointments, and when to call the physician. Intervention: Discharge to appropriate level of care  Note: Discuss understanding of discharge instructions,follow-up appointments, and when to call the physician. Problem: Falls - Risk of:  Goal: Will remain free from falls  Description: Will remain free from falls  Outcome: Adequate for Discharge  Note: No falls occurred with visit today. Intervention: Assess risk factors for falls  Description: Assess risk factors for falls  Note: Verbalized understanding of fall prevention to ask for assistance with ambulation. Call light within reach. Care plan reviewed with patient . Patient  verbalize understanding of the plan of care and contribute to goal setting.

## 2023-04-17 NOTE — PROGRESS NOTES
Patient assessed for the following post chemotherapy:    Dizziness   No  Lightheadedness  No      Acute nausea/vomiting No  Headache   No  Chest pain/pressure  No  Rash/itching   No  Shortness of breath  No    Patient declined for 20 minutes observation post infusion venofer. Patient tolerated venofer infusion without any complications. Last vital signs:   /78   Pulse 74   Temp 98 °F (36.7 °C) (Oral)   Resp 16   Ht 5' 5\" (1.651 m)   Wt 150 lb (68 kg)   LMP 03/18/2023   SpO2 100%   BMI 24.96 kg/m²         Patient instructed if experience any of the above symptoms following today's infusion,he/she is to notify MD immediately or go to the emergency department. Discharge instructions given to patient. Verbalizes understanding. Ambulated off unit per self with belongings.

## 2023-04-17 NOTE — DISCHARGE INSTRUCTIONS
Please contact your Oncologist if you have any questions regarding the venofer that you received today. Patient instructed if experience any of the symptoms following today's iron infusion / to notify MD immediately or go to emergency department.     * dizziness/lightheadedness  *acute nausea/vomiting - not relieved with medication  *headache - not relieved from Tylenol/pain medication  *chest pain/pressure  *rash/itching  *shortness of breath        Drink fluids - 48oz fluids daily  Call if develop fever/ chills/ signs or symptoms of infection

## 2023-04-24 ENCOUNTER — HOSPITAL ENCOUNTER (OUTPATIENT)
Dept: INFUSION THERAPY | Age: 38
Discharge: HOME | End: 2023-04-24
Payer: COMMERCIAL

## 2023-04-24 VITALS
TEMPERATURE: 98.2 F | WEIGHT: 150 LBS | RESPIRATION RATE: 16 BRPM | HEART RATE: 78 BPM | BODY MASS INDEX: 24.99 KG/M2 | OXYGEN SATURATION: 100 % | HEIGHT: 65 IN | SYSTOLIC BLOOD PRESSURE: 121 MMHG | DIASTOLIC BLOOD PRESSURE: 80 MMHG

## 2023-04-24 DIAGNOSIS — R53.83 OTHER FATIGUE: ICD-10-CM

## 2023-04-24 DIAGNOSIS — R06.02 SHORTNESS OF BREATH: ICD-10-CM

## 2023-04-24 DIAGNOSIS — D50.0 IRON DEFICIENCY ANEMIA DUE TO CHRONIC BLOOD LOSS: Primary | ICD-10-CM

## 2023-04-24 DIAGNOSIS — K51.011 ULCERATIVE PANCOLITIS WITH RECTAL BLEEDING (HCC): ICD-10-CM

## 2023-04-24 DIAGNOSIS — D50.0 CHRONIC BLOOD LOSS ANEMIA: ICD-10-CM

## 2023-04-24 PROCEDURE — 6360000002 HC RX W HCPCS: Performed by: PHYSICIAN ASSISTANT

## 2023-04-24 PROCEDURE — 96366 THER/PROPH/DIAG IV INF ADDON: CPT

## 2023-04-24 PROCEDURE — 2580000003 HC RX 258: Performed by: PHYSICIAN ASSISTANT

## 2023-04-24 PROCEDURE — 96365 THER/PROPH/DIAG IV INF INIT: CPT

## 2023-04-24 RX ORDER — SODIUM CHLORIDE 9 MG/ML
5-250 INJECTION, SOLUTION INTRAVENOUS PRN
Status: DISCONTINUED | OUTPATIENT
Start: 2023-04-24 | End: 2023-04-25 | Stop reason: HOSPADM

## 2023-04-24 RX ORDER — SODIUM CHLORIDE 0.9 % (FLUSH) 0.9 %
5-40 SYRINGE (ML) INJECTION PRN
OUTPATIENT
Start: 2023-04-24

## 2023-04-24 RX ORDER — SODIUM CHLORIDE 9 MG/ML
INJECTION, SOLUTION INTRAVENOUS CONTINUOUS
OUTPATIENT
Start: 2023-04-24

## 2023-04-24 RX ORDER — ACETAMINOPHEN 325 MG/1
650 TABLET ORAL
OUTPATIENT
Start: 2023-04-24

## 2023-04-24 RX ORDER — DIPHENHYDRAMINE HYDROCHLORIDE 50 MG/ML
50 INJECTION INTRAMUSCULAR; INTRAVENOUS
OUTPATIENT
Start: 2023-04-24

## 2023-04-24 RX ORDER — HEPARIN SODIUM (PORCINE) LOCK FLUSH IV SOLN 100 UNIT/ML 100 UNIT/ML
500 SOLUTION INTRAVENOUS PRN
OUTPATIENT
Start: 2023-04-24

## 2023-04-24 RX ORDER — SODIUM CHLORIDE 9 MG/ML
5-250 INJECTION, SOLUTION INTRAVENOUS PRN
OUTPATIENT
Start: 2023-04-24

## 2023-04-24 RX ORDER — ALBUTEROL SULFATE 90 UG/1
4 AEROSOL, METERED RESPIRATORY (INHALATION) PRN
OUTPATIENT
Start: 2023-04-24

## 2023-04-24 RX ORDER — EPINEPHRINE 1 MG/ML
0.3 INJECTION, SOLUTION INTRAMUSCULAR; SUBCUTANEOUS PRN
OUTPATIENT
Start: 2023-04-24

## 2023-04-24 RX ORDER — ONDANSETRON 2 MG/ML
8 INJECTION INTRAMUSCULAR; INTRAVENOUS
OUTPATIENT
Start: 2023-04-24

## 2023-04-24 RX ADMIN — IRON SUCROSE 300 MG: 20 INJECTION, SOLUTION INTRAVENOUS at 15:05

## 2023-04-24 RX ADMIN — SODIUM CHLORIDE 20 ML/HR: 9 INJECTION, SOLUTION INTRAVENOUS at 14:49

## 2023-04-24 NOTE — PROGRESS NOTES
Patient assessed for the following post  venofer infusion:    Dizziness   No  Lightheadedness  No      Acute nausea/vomiting No  Headache   No  Chest pain/pressure  No  Rash/itching   No  Shortness of breath  No    Patient kept for 20 minutes observation post infusion venofer. Patient tolerated venofer infusion without any complications. Last vital signs:   /80   Pulse 78   Temp 98.2 °F (36.8 °C) (Oral)   Resp 16   Ht 5' 5\" (1.651 m)   Wt 150 lb (68 kg)   LMP 03/18/2023   SpO2 100%   BMI 24.96 kg/m²         Patient instructed if experience any of the above symptoms following today's infusion,he/she is to notify MD immediately or go to the emergency department. Discharge instructions given to patient. Verbalizes understanding. Ambulated off unit per self with belongings.

## 2023-04-24 NOTE — PLAN OF CARE
Problem: Intellectual/Education/Knowledge Deficit  Goal: Teaching initiated upon admission  Outcome: Progressing  Note: Patient verbalizes understanding to verbal information given on venofer,action and possible side effects. Aware to call MD if develop complications. Problem: Discharge Planning  Goal: Knowledge of discharge instructions  Description: Knowledge of discharge instructions     Outcome: Progressing  Note: Verbalize understanding of discharge instructions, follow up appointments, and when to call Physician. Problem: Falls - Risk of:  Goal: Will remain free from falls  Description: Will remain free from falls  Outcome: Progressing  Note: No falls occurred with visit today. Intervention: Assess risk factors for falls  Note: Discussed the need to use the call light for assistance when getting up to ambulate. Care plan reviewed with patient. Patient verbalizes understanding of the plan of care and contributes to goal setting.

## 2023-05-30 NOTE — PLAN OF CARE
Problem: Musculor/Skeletal Functional Status  Goal: Absence of falls  Outcome: Met This Shift  Note: Patient free from falls while in O.P. Oncology. Intervention: Fall precautions  Note: Patient assessed for fall risk on admission to [address] [facility]. Fall band placed on patient. Discussed the need to use the call light for assistance prior to getting up out of chair/bed. Problem: Intellectual/Education/Knowledge Deficit  Goal: Teaching initiated upon admission  Outcome: Met This Shift  Note: Patient verbalizes understanding to verbal information given on Venofer ,action and possible side effects. Aware to call MD if develop complications. Intervention: Verbal/written education provided  Note: Venofer reviewed, patient verbalizes understanding of medication being administered and potential side effects. Problem: Discharge Planning  Goal: Knowledge of discharge instructions  Description: Knowledge of discharge instructions     Outcome: Met This Shift  Note: Verbalized understanding of discharge instructions, follow-up appointments, and when to call the physician. Intervention: Interaction with patient/family and care team  Note: Discuss understanding of discharge instructions,follow-up appointments, and when to call the physician. Care plan reviewed with patient. Patient verbalize understanding of the plan of care and contribute to goal setting. The patient is a 42y Male complaining of pain, lower leg.

## 2023-06-22 ENCOUNTER — APPOINTMENT (OUTPATIENT)
Dept: GENERAL RADIOLOGY | Age: 38
End: 2023-06-22
Payer: COMMERCIAL

## 2023-06-22 ENCOUNTER — HOSPITAL ENCOUNTER (INPATIENT)
Age: 38
LOS: 1 days | Discharge: HOME | End: 2023-06-23
Attending: EMERGENCY MEDICINE | Admitting: STUDENT IN AN ORGANIZED HEALTH CARE EDUCATION/TRAINING PROGRAM
Payer: COMMERCIAL

## 2023-06-22 ENCOUNTER — TELEPHONE (OUTPATIENT)
Dept: ONCOLOGY | Age: 38
End: 2023-06-22

## 2023-06-22 DIAGNOSIS — D64.9 SYMPTOMATIC ANEMIA: ICD-10-CM

## 2023-06-22 DIAGNOSIS — K51.011 ULCERATIVE PANCOLITIS WITH RECTAL BLEEDING (HCC): Primary | ICD-10-CM

## 2023-06-22 LAB
NT-PROBNP SERPL IA-MCNC: 205.9 PG/ML (ref 0–124)
TROPONIN T: < 0.01 NG/ML

## 2023-06-22 PROCEDURE — 6370000000 HC RX 637 (ALT 250 FOR IP): Performed by: EMERGENCY MEDICINE

## 2023-06-22 PROCEDURE — 80053 COMPREHEN METABOLIC PANEL: CPT

## 2023-06-22 PROCEDURE — 85025 COMPLETE CBC W/AUTO DIFF WBC: CPT

## 2023-06-22 PROCEDURE — 71046 X-RAY EXAM CHEST 2 VIEWS: CPT

## 2023-06-22 PROCEDURE — 84484 ASSAY OF TROPONIN QUANT: CPT

## 2023-06-22 PROCEDURE — 84703 CHORIONIC GONADOTROPIN ASSAY: CPT

## 2023-06-22 PROCEDURE — 2580000003 HC RX 258: Performed by: EMERGENCY MEDICINE

## 2023-06-22 PROCEDURE — 86900 BLOOD TYPING SEROLOGIC ABO: CPT

## 2023-06-22 PROCEDURE — 36415 COLL VENOUS BLD VENIPUNCTURE: CPT

## 2023-06-22 PROCEDURE — 86901 BLOOD TYPING SEROLOGIC RH(D): CPT

## 2023-06-22 PROCEDURE — 93005 ELECTROCARDIOGRAM TRACING: CPT | Performed by: STUDENT IN AN ORGANIZED HEALTH CARE EDUCATION/TRAINING PROGRAM

## 2023-06-22 PROCEDURE — 83735 ASSAY OF MAGNESIUM: CPT

## 2023-06-22 PROCEDURE — 99285 EMERGENCY DEPT VISIT HI MDM: CPT

## 2023-06-22 PROCEDURE — 86140 C-REACTIVE PROTEIN: CPT

## 2023-06-22 PROCEDURE — P9016 RBC LEUKOCYTES REDUCED: HCPCS

## 2023-06-22 PROCEDURE — 83880 ASSAY OF NATRIURETIC PEPTIDE: CPT

## 2023-06-22 PROCEDURE — 86850 RBC ANTIBODY SCREEN: CPT

## 2023-06-22 PROCEDURE — 86923 COMPATIBILITY TEST ELECTRIC: CPT

## 2023-06-22 RX ORDER — SODIUM CHLORIDE 9 MG/ML
INJECTION, SOLUTION INTRAVENOUS PRN
Status: DISCONTINUED | OUTPATIENT
Start: 2023-06-22 | End: 2023-06-23 | Stop reason: HOSPADM

## 2023-06-22 RX ORDER — ACETAMINOPHEN 325 MG/1
650 TABLET ORAL ONCE
Status: COMPLETED | OUTPATIENT
Start: 2023-06-23 | End: 2023-06-22

## 2023-06-22 RX ORDER — 0.9 % SODIUM CHLORIDE 0.9 %
1000 INTRAVENOUS SOLUTION INTRAVENOUS ONCE
Status: COMPLETED | OUTPATIENT
Start: 2023-06-22 | End: 2023-06-23

## 2023-06-22 RX ADMIN — ACETAMINOPHEN 650 MG: 325 TABLET ORAL at 23:38

## 2023-06-22 RX ADMIN — SODIUM CHLORIDE 1000 ML: 9 INJECTION, SOLUTION INTRAVENOUS at 23:45

## 2023-06-22 ASSESSMENT — PAIN - FUNCTIONAL ASSESSMENT: PAIN_FUNCTIONAL_ASSESSMENT: NONE - DENIES PAIN

## 2023-06-23 VITALS
HEART RATE: 92 BPM | OXYGEN SATURATION: 98 % | WEIGHT: 147.4 LBS | TEMPERATURE: 98.8 F | RESPIRATION RATE: 16 BRPM | SYSTOLIC BLOOD PRESSURE: 125 MMHG | BODY MASS INDEX: 24.56 KG/M2 | DIASTOLIC BLOOD PRESSURE: 78 MMHG | HEIGHT: 65 IN

## 2023-06-23 PROBLEM — D62 ACUTE BLOOD LOSS ANEMIA: Status: ACTIVE | Noted: 2023-06-23

## 2023-06-23 LAB
ABO: NORMAL
ALBUMIN SERPL BCG-MCNC: 4.2 G/DL (ref 3.5–5.1)
ALP SERPL-CCNC: 75 U/L (ref 38–126)
ALT SERPL W/O P-5'-P-CCNC: 14 U/L (ref 11–66)
ANION GAP SERPL CALC-SCNC: 13 MEQ/L (ref 8–16)
ANION GAP SERPL CALC-SCNC: 18 MEQ/L (ref 8–16)
ANTIBODY SCREEN: NORMAL
AST SERPL-CCNC: 18 U/L (ref 5–40)
B-HCG SERPL QL: NEGATIVE
BACTERIA URNS QL MICRO: ABNORMAL /HPF
BASOPHILS ABSOLUTE: 0 THOU/MM3 (ref 0–0.1)
BASOPHILS NFR BLD AUTO: 0.2 %
BILIRUB SERPL-MCNC: < 0.2 MG/DL (ref 0.3–1.2)
BILIRUB UR QL STRIP.AUTO: NEGATIVE
BUN SERPL-MCNC: 12 MG/DL (ref 7–22)
BUN SERPL-MCNC: 16 MG/DL (ref 7–22)
CA-I BLD ISE-SCNC: 1.16 MMOL/L (ref 1.12–1.32)
CALCIUM SERPL-MCNC: 8.3 MG/DL (ref 8.5–10.5)
CALCIUM SERPL-MCNC: 9.1 MG/DL (ref 8.5–10.5)
CASTS #/AREA URNS LPF: ABNORMAL /LPF
CASTS 2: ABNORMAL /LPF
CHARACTER UR: CLEAR
CHLORIDE SERPL-SCNC: 100 MEQ/L (ref 98–111)
CHLORIDE SERPL-SCNC: 106 MEQ/L (ref 98–111)
CO2 SERPL-SCNC: 18 MEQ/L (ref 23–33)
CO2 SERPL-SCNC: 19 MEQ/L (ref 23–33)
COLOR: YELLOW
CREAT SERPL-MCNC: 0.9 MG/DL (ref 0.4–1.2)
CREAT SERPL-MCNC: 1.1 MG/DL (ref 0.4–1.2)
CRP SERPL-MCNC: 1.61 MG/DL (ref 0–1)
CRYSTALS URNS MICRO: ABNORMAL
DEPRECATED RDW RBC AUTO: 55.8 FL (ref 35–45)
EKG ATRIAL RATE: 113 BPM
EKG P AXIS: 72 DEGREES
EKG P-R INTERVAL: 138 MS
EKG Q-T INTERVAL: 350 MS
EKG QRS DURATION: 84 MS
EKG QTC CALCULATION (BAZETT): 480 MS
EKG R AXIS: 60 DEGREES
EKG T AXIS: 64 DEGREES
EKG VENTRICULAR RATE: 113 BPM
EOSINOPHIL NFR BLD AUTO: 0 %
EOSINOPHILS ABSOLUTE: 0 THOU/MM3 (ref 0–0.4)
EPITHELIAL CELLS, UA: ABNORMAL /HPF
ERYTHROCYTE [DISTWIDTH] IN BLOOD BY AUTOMATED COUNT: 20.7 % (ref 11.5–14.5)
ERYTHROCYTE [SEDIMENTATION RATE] IN BLOOD BY WESTERGREN METHOD: 35 MM/HR (ref 0–20)
GFR SERPL CREATININE-BSD FRML MDRD: > 60 ML/MIN/1.73M2
GFR SERPL CREATININE-BSD FRML MDRD: > 60 ML/MIN/1.73M2
GLUCOSE BLD STRIP.AUTO-MCNC: 102 MG/DL (ref 70–108)
GLUCOSE BLD STRIP.AUTO-MCNC: 115 MG/DL (ref 70–108)
GLUCOSE BLD STRIP.AUTO-MCNC: 170 MG/DL (ref 70–108)
GLUCOSE SERPL-MCNC: 120 MG/DL (ref 70–108)
GLUCOSE SERPL-MCNC: 151 MG/DL (ref 70–108)
GLUCOSE UR QL STRIP.AUTO: NEGATIVE MG/DL
HCT VFR BLD AUTO: 18.5 % (ref 37–47)
HCT VFR BLD AUTO: 20.3 % (ref 37–47)
HCT VFR BLD AUTO: 25.4 % (ref 37–47)
HGB BLD-MCNC: 5.3 GM/DL (ref 12–16)
HGB BLD-MCNC: 6 GM/DL (ref 12–16)
HGB BLD-MCNC: 7.7 GM/DL (ref 12–16)
HGB UR QL STRIP.AUTO: NEGATIVE
HYPOCHROMIA BLD QL SMEAR: PRESENT
IMM GRANULOCYTES # BLD AUTO: 0.08 THOU/MM3 (ref 0–0.07)
IMM GRANULOCYTES NFR BLD AUTO: 0.7 %
KETONES UR QL STRIP.AUTO: ABNORMAL
LACTATE SERPL-SCNC: 0.7 MMOL/L (ref 0.5–2)
LYMPHOCYTES ABSOLUTE: 0.5 THOU/MM3 (ref 1–4.8)
LYMPHOCYTES NFR BLD AUTO: 4.5 %
MAGNESIUM SERPL-MCNC: 1.9 MG/DL (ref 1.6–2.4)
MAGNESIUM SERPL-MCNC: 1.9 MG/DL (ref 1.6–2.4)
MCH RBC QN AUTO: 21.5 PG (ref 26–33)
MCHC RBC AUTO-ENTMCNC: 28.6 GM/DL (ref 32.2–35.5)
MCV RBC AUTO: 74.9 FL (ref 81–99)
MISCELLANEOUS 2: ABNORMAL
MONOCYTES ABSOLUTE: 0.1 THOU/MM3 (ref 0.4–1.3)
MONOCYTES NFR BLD AUTO: 0.5 %
MRSA DNA SPEC QL NAA+PROBE: NEGATIVE
NEUTROPHILS NFR BLD AUTO: 94.1 %
NITRITE UR QL STRIP: NEGATIVE
NRBC BLD AUTO-RTO: 0 /100 WBC
OSMOLALITY SERPL CALC.SUM OF ELEC: 277.9 MOSMOL/KG (ref 275–300)
PATHOLOGIST REVIEW: ABNORMAL
PH UR STRIP.AUTO: 6.5 [PH] (ref 5–9)
PHOSPHATE SERPL-MCNC: 3.6 MG/DL (ref 2.4–4.7)
PLATELET # BLD AUTO: 664 THOU/MM3 (ref 130–400)
PLATELET BLD QL SMEAR: ABNORMAL
PMV BLD AUTO: 9.1 FL (ref 9.4–12.4)
POIKILOCYTES: ABNORMAL
POTASSIUM SERPL-SCNC: 4.1 MEQ/L (ref 3.5–5.2)
POTASSIUM SERPL-SCNC: 5 MEQ/L (ref 3.5–5.2)
PROT SERPL-MCNC: 7.8 G/DL (ref 6.1–8)
PROT UR STRIP.AUTO-MCNC: NEGATIVE MG/DL
RBC # BLD AUTO: 2.47 MILL/MM3 (ref 4.2–5.4)
RBC URINE: ABNORMAL /HPF
RENAL EPI CELLS #/AREA URNS HPF: ABNORMAL /[HPF]
RH FACTOR: NORMAL
SCAN OF BLOOD SMEAR: NORMAL
SEGMENTED NEUTROPHILS ABSOLUTE COUNT: 11.5 THOU/MM3 (ref 1.8–7.7)
SODIUM SERPL-SCNC: 137 MEQ/L (ref 135–145)
SODIUM SERPL-SCNC: 137 MEQ/L (ref 135–145)
SP GR UR REFRACT.AUTO: 1.01 (ref 1–1.03)
UROBILINOGEN, URINE: 0.2 EU/DL (ref 0–1)
WBC # BLD AUTO: 12.2 THOU/MM3 (ref 4.8–10.8)
WBC #/AREA URNS HPF: ABNORMAL /HPF
WBC #/AREA URNS HPF: ABNORMAL /[HPF]
YEAST LIKE FUNGI URNS QL MICRO: ABNORMAL

## 2023-06-23 PROCEDURE — 6370000000 HC RX 637 (ALT 250 FOR IP)

## 2023-06-23 PROCEDURE — 84100 ASSAY OF PHOSPHORUS: CPT

## 2023-06-23 PROCEDURE — 85014 HEMATOCRIT: CPT

## 2023-06-23 PROCEDURE — 85018 HEMOGLOBIN: CPT

## 2023-06-23 PROCEDURE — 1200000003 HC TELEMETRY R&B

## 2023-06-23 PROCEDURE — 83605 ASSAY OF LACTIC ACID: CPT

## 2023-06-23 PROCEDURE — 87070 CULTURE OTHR SPECIMN AEROBIC: CPT

## 2023-06-23 PROCEDURE — 2580000003 HC RX 258

## 2023-06-23 PROCEDURE — 99223 1ST HOSP IP/OBS HIGH 75: CPT

## 2023-06-23 PROCEDURE — 82948 REAGENT STRIP/BLOOD GLUCOSE: CPT

## 2023-06-23 PROCEDURE — 87641 MR-STAPH DNA AMP PROBE: CPT

## 2023-06-23 PROCEDURE — 30233N1 TRANSFUSION OF NONAUTOLOGOUS RED BLOOD CELLS INTO PERIPHERAL VEIN, PERCUTANEOUS APPROACH: ICD-10-PCS | Performed by: STUDENT IN AN ORGANIZED HEALTH CARE EDUCATION/TRAINING PROGRAM

## 2023-06-23 PROCEDURE — 85651 RBC SED RATE NONAUTOMATED: CPT

## 2023-06-23 PROCEDURE — 93010 ELECTROCARDIOGRAM REPORT: CPT | Performed by: INTERNAL MEDICINE

## 2023-06-23 PROCEDURE — 36415 COLL VENOUS BLD VENIPUNCTURE: CPT

## 2023-06-23 PROCEDURE — 82330 ASSAY OF CALCIUM: CPT

## 2023-06-23 PROCEDURE — 6360000002 HC RX W HCPCS

## 2023-06-23 PROCEDURE — 81001 URINALYSIS AUTO W/SCOPE: CPT

## 2023-06-23 PROCEDURE — 83735 ASSAY OF MAGNESIUM: CPT

## 2023-06-23 PROCEDURE — 80048 BASIC METABOLIC PNL TOTAL CA: CPT

## 2023-06-23 PROCEDURE — 36430 TRANSFUSION BLD/BLD COMPNT: CPT

## 2023-06-23 RX ORDER — METHYLPREDNISOLONE SODIUM SUCCINATE 40 MG/ML
20 INJECTION, POWDER, LYOPHILIZED, FOR SOLUTION INTRAMUSCULAR; INTRAVENOUS EVERY 8 HOURS
Status: DISCONTINUED | OUTPATIENT
Start: 2023-06-23 | End: 2023-06-23 | Stop reason: HOSPADM

## 2023-06-23 RX ORDER — PREDNISONE 20 MG/1
20 TABLET ORAL 2 TIMES DAILY
Status: DISCONTINUED | OUTPATIENT
Start: 2023-06-23 | End: 2023-06-23

## 2023-06-23 RX ORDER — POTASSIUM CHLORIDE 7.45 MG/ML
10 INJECTION INTRAVENOUS PRN
Status: DISCONTINUED | OUTPATIENT
Start: 2023-06-23 | End: 2023-06-23 | Stop reason: HOSPADM

## 2023-06-23 RX ORDER — ACETAMINOPHEN 650 MG/1
650 SUPPOSITORY RECTAL EVERY 6 HOURS PRN
Status: DISCONTINUED | OUTPATIENT
Start: 2023-06-23 | End: 2023-06-23 | Stop reason: HOSPADM

## 2023-06-23 RX ORDER — DEXTROSE MONOHYDRATE 100 MG/ML
INJECTION, SOLUTION INTRAVENOUS CONTINUOUS PRN
Status: DISCONTINUED | OUTPATIENT
Start: 2023-06-23 | End: 2023-06-23 | Stop reason: HOSPADM

## 2023-06-23 RX ORDER — SODIUM CHLORIDE 0.9 % (FLUSH) 0.9 %
5-40 SYRINGE (ML) INJECTION EVERY 12 HOURS SCHEDULED
Status: DISCONTINUED | OUTPATIENT
Start: 2023-06-23 | End: 2023-06-23 | Stop reason: HOSPADM

## 2023-06-23 RX ORDER — INSULIN LISPRO 100 [IU]/ML
0-4 INJECTION, SOLUTION INTRAVENOUS; SUBCUTANEOUS NIGHTLY
Status: DISCONTINUED | OUTPATIENT
Start: 2023-06-23 | End: 2023-06-23 | Stop reason: HOSPADM

## 2023-06-23 RX ORDER — DULOXETIN HYDROCHLORIDE 30 MG/1
30 CAPSULE, DELAYED RELEASE ORAL DAILY
COMMUNITY
Start: 2023-06-12

## 2023-06-23 RX ORDER — INSULIN LISPRO 100 [IU]/ML
0-4 INJECTION, SOLUTION INTRAVENOUS; SUBCUTANEOUS
Status: DISCONTINUED | OUTPATIENT
Start: 2023-06-23 | End: 2023-06-23 | Stop reason: HOSPADM

## 2023-06-23 RX ORDER — PANTOPRAZOLE SODIUM 40 MG/1
40 TABLET, DELAYED RELEASE ORAL
Status: DISCONTINUED | OUTPATIENT
Start: 2023-06-23 | End: 2023-06-23 | Stop reason: HOSPADM

## 2023-06-23 RX ORDER — PROCHLORPERAZINE EDISYLATE 5 MG/ML
10 INJECTION INTRAMUSCULAR; INTRAVENOUS EVERY 6 HOURS PRN
Status: DISCONTINUED | OUTPATIENT
Start: 2023-06-23 | End: 2023-06-23 | Stop reason: HOSPADM

## 2023-06-23 RX ORDER — ACETAMINOPHEN 325 MG/1
650 TABLET ORAL EVERY 6 HOURS PRN
Status: DISCONTINUED | OUTPATIENT
Start: 2023-06-23 | End: 2023-06-23 | Stop reason: HOSPADM

## 2023-06-23 RX ORDER — DULOXETIN HYDROCHLORIDE 30 MG/1
30 CAPSULE, DELAYED RELEASE ORAL DAILY
Status: DISCONTINUED | OUTPATIENT
Start: 2023-06-23 | End: 2023-06-23 | Stop reason: HOSPADM

## 2023-06-23 RX ORDER — POTASSIUM CHLORIDE 20 MEQ/1
40 TABLET, EXTENDED RELEASE ORAL PRN
Status: DISCONTINUED | OUTPATIENT
Start: 2023-06-23 | End: 2023-06-23 | Stop reason: HOSPADM

## 2023-06-23 RX ORDER — MAGNESIUM SULFATE 1 G/100ML
1000 INJECTION INTRAVENOUS ONCE
Status: COMPLETED | OUTPATIENT
Start: 2023-06-23 | End: 2023-06-23

## 2023-06-23 RX ORDER — MESALAMINE 400 MG/1
1600 CAPSULE, DELAYED RELEASE ORAL 3 TIMES DAILY
Status: DISCONTINUED | OUTPATIENT
Start: 2023-06-23 | End: 2023-06-23 | Stop reason: HOSPADM

## 2023-06-23 RX ORDER — MESALAMINE 400 MG/1
400 CAPSULE, DELAYED RELEASE ORAL 3 TIMES DAILY
Status: DISCONTINUED | OUTPATIENT
Start: 2023-06-23 | End: 2023-06-23 | Stop reason: DRUGHIGH

## 2023-06-23 RX ORDER — MAGNESIUM SULFATE IN WATER 40 MG/ML
2000 INJECTION, SOLUTION INTRAVENOUS PRN
Status: DISCONTINUED | OUTPATIENT
Start: 2023-06-23 | End: 2023-06-23 | Stop reason: HOSPADM

## 2023-06-23 RX ORDER — POLYETHYLENE GLYCOL 3350 17 G/17G
17 POWDER, FOR SOLUTION ORAL DAILY PRN
Status: DISCONTINUED | OUTPATIENT
Start: 2023-06-23 | End: 2023-06-23 | Stop reason: HOSPADM

## 2023-06-23 RX ORDER — SODIUM CHLORIDE 0.9 % (FLUSH) 0.9 %
5-40 SYRINGE (ML) INJECTION PRN
Status: DISCONTINUED | OUTPATIENT
Start: 2023-06-23 | End: 2023-06-23 | Stop reason: HOSPADM

## 2023-06-23 RX ORDER — SODIUM CHLORIDE 9 MG/ML
INJECTION, SOLUTION INTRAVENOUS PRN
Status: DISCONTINUED | OUTPATIENT
Start: 2023-06-23 | End: 2023-06-23 | Stop reason: HOSPADM

## 2023-06-23 RX ORDER — NORETHINDRONE ACETATE AND ETHINYL ESTRADIOL 1MG-20(21)
1 KIT ORAL DAILY
Status: DISCONTINUED | OUTPATIENT
Start: 2023-06-23 | End: 2023-06-23 | Stop reason: HOSPADM

## 2023-06-23 RX ADMIN — METHYLPREDNISOLONE SODIUM SUCCINATE 20 MG: 40 INJECTION INTRAMUSCULAR; INTRAVENOUS at 13:55

## 2023-06-23 RX ADMIN — METHYLPREDNISOLONE SODIUM SUCCINATE 20 MG: 40 INJECTION INTRAMUSCULAR; INTRAVENOUS at 04:58

## 2023-06-23 RX ADMIN — SODIUM CHLORIDE, PRESERVATIVE FREE 10 ML: 5 INJECTION INTRAVENOUS at 09:47

## 2023-06-23 RX ADMIN — DULOXETINE HYDROCHLORIDE 30 MG: 30 CAPSULE, DELAYED RELEASE ORAL at 09:46

## 2023-06-23 RX ADMIN — MAGNESIUM SULFATE HEPTAHYDRATE 1000 MG: 1 INJECTION, SOLUTION INTRAVENOUS at 03:59

## 2023-06-23 RX ADMIN — MESALAMINE 1600 MG: 400 CAPSULE, DELAYED RELEASE ORAL at 17:20

## 2023-06-23 RX ADMIN — SODIUM CHLORIDE 1000 ML: 9 INJECTION, SOLUTION INTRAVENOUS at 03:55

## 2023-06-23 RX ADMIN — PANTOPRAZOLE SODIUM 40 MG: 40 TABLET, DELAYED RELEASE ORAL at 06:52

## 2023-06-23 ASSESSMENT — PAIN - FUNCTIONAL ASSESSMENT
PAIN_FUNCTIONAL_ASSESSMENT: ACTIVITIES ARE NOT PREVENTED
PAIN_FUNCTIONAL_ASSESSMENT: NONE - DENIES PAIN

## 2023-06-23 ASSESSMENT — PAIN DESCRIPTION - LOCATION: LOCATION: HEAD

## 2023-06-23 ASSESSMENT — PAIN DESCRIPTION - ORIENTATION: ORIENTATION: OTHER (COMMENT)

## 2023-06-23 ASSESSMENT — PAIN DESCRIPTION - ONSET: ONSET: ON-GOING

## 2023-06-23 ASSESSMENT — PAIN DESCRIPTION - DESCRIPTORS: DESCRIPTORS: ACHING

## 2023-06-23 ASSESSMENT — PAIN SCALES - GENERAL: PAINLEVEL_OUTOF10: 3

## 2023-06-23 ASSESSMENT — PAIN DESCRIPTION - FREQUENCY: FREQUENCY: CONTINUOUS

## 2023-06-23 ASSESSMENT — PAIN DESCRIPTION - PAIN TYPE: TYPE: ACUTE PAIN

## 2023-06-23 NOTE — PROGRESS NOTES
Pt to receive total third PRBC per order d/t low hgb. Pt initially not sure if she wants to stay since her condition is not new, and she feels she can manage it at home. Pt has received total 2 units PRBC, hgb 7.7 and would like to go home. Physician is aware, writer conveyed to pt that physician does not feel discharge is appropriate, and pt would have to leave ama. This writer spoke with pt and pt is planning to leave ama after final unit is transfused. Physician notified.

## 2023-06-23 NOTE — PROGRESS NOTES
Patient had stool soft, brown, no form large amount with scant red [place] on perimeter, barely visible.

## 2023-06-23 NOTE — PLAN OF CARE
Problem: Discharge Planning  Goal: Discharge to home or other facility with appropriate resources  Outcome: Progressing  Flowsheets (Taken 6/23/2023 0230)  Discharge to home or other facility with appropriate resources: Identify barriers to discharge with patient and caregiver     Problem: Pain  Goal: Verbalizes/displays adequate comfort level or baseline comfort level  Outcome: Progressing  Flowsheets (Taken 6/23/2023 0435)  Verbalizes/displays adequate comfort level or baseline comfort level: Encourage patient to monitor pain and request assistance     Problem: Skin/Tissue Integrity  Goal: Absence of new skin breakdown  Description: 1. Monitor for areas of redness and/or skin breakdown  2. Assess vascular access sites hourly  3. Every 4-6 hours minimum:  Change oxygen saturation probe site  4. Every 4-6 hours:  If on nasal continuous positive airway pressure, respiratory therapy assess nares and determine need for appliance change or resting period.   Outcome: Progressing     Problem: Metabolic/Fluid and Electrolytes - Adult  Goal: Electrolytes maintained within normal limits  Outcome: Progressing  Flowsheets (Taken 6/23/2023 0230)  Electrolytes maintained within normal limits: Monitor labs and assess patient for signs and symptoms of electrolyte imbalances  Goal: Hemodynamic stability and optimal renal function maintained  Outcome: Progressing  Flowsheets (Taken 6/23/2023 0230)  Hemodynamic stability and optimal renal function maintained:   Monitor labs and assess for signs and symptoms of volume excess or deficit   Monitor intake, output and patient weight  Goal: Glucose maintained within prescribed range  Outcome: Progressing  Flowsheets (Taken 6/23/2023 0435)  Glucose maintained within prescribed range: Monitor blood glucose as ordered     Problem: Hematologic - Adult  Goal: Maintains hematologic stability  Outcome: Progressing  Flowsheets (Taken 6/23/2023 0230)  Maintains hematologic stability: Assess

## 2023-06-23 NOTE — PROGRESS NOTES
This Virtual RN completed admission requirements with patient. Call light is within reach at this time; patient has been educated to use the call light to report any change in condition to bedside nursing staff.

## 2023-06-23 NOTE — H&P
Hospitalist History and Physical          Patient Info:   Name: Edil Mcmanus, : 1985, PCP: Evelin Honeycutt MD  Unit/Bed:[facility]      Admitted on: 2023  Date of Service: Pt seen/examined on 23 and Admitted to Inpatient with expected LOS greater than two midnights due to medical therapy. Assessment and Plan:  Ulcerative colitis flare: Patient with recent scope on 2023. Failed biologic due to intolerance. Solumedrol 20 mg IVP q8h   Low dose humalog with ac/hs checks  PO tylenol for pain  Consider adding abdominal imaging if symptoms worsen  GI consult in am.  Serial abdominal exams    Acute symptomatic anemia: HGB 5.3. Anemia is 2/2 to bloody diarrhea from UC flare. PRBC started in ED total of 2 units to be given. Check H&H per protocol after PRBC transfused. Transfuse to keep hgb > 7  Strict I/O  Daily weights  Serial H&H q6h for now    Abdominal pain: likely 2/2 to #1  Serial abdominal exams and notify provider if patient has any changes in severity. Shortness of breath: likely 2/2 to #2. Maintain SPO2 > 92%    Leukocytosis: WBC 12.2. Likely 2/2 to UC flare  Trend CBC daily    Thrombocytosis: Platelets 856, which is likely 2/2 to UC flare  Trend daily CBC     GERD: continue protonix      Data reviewed  EKG:  I have reviewed the EKG with the following interpretation: Sinus tachycardia  Imaging: I have reviewed chest x-ray with the following interpretation: no acute process      ===================================================================      Chief Complaint:  bloody stools    History Of Present Illness:  Edil Mcmanus is a 40 y.o. female with PMHx of ulcerative colitis, and normocytic anemia who presents to [facility] with bloody stools. Patient admits to having 7 bloody stools today while at home with last one being around 1500 this afternoon.  Endorses having shortness of breath and weakness while at home that has been increasing over the last

## 2023-06-23 NOTE — ED NOTES
ED to inpatient nurses report    Chief Complaint   Patient presents with    Other     UC flare up    Shortness of Breath      Present to ED from home  LOC: alert and orientated to name, place, date  Vital signs   Vitals:    06/23/23 0112 06/23/23 0119 06/23/23 0124 06/23/23 0129   BP: 136/89 124/78 124/77 123/81   Pulse: 98 95 94 92   Resp: 16 15 14 15   Temp: 98.4 °F (36.9 °C) 98.4 °F (36.9 °C) 98.4 °F (36.9 °C) 98.6 °F (37 °C)   TempSrc: Oral Oral Oral Oral   SpO2: 99% 99% 100% 100%   Weight:          Oxygen Baseline 0 L    Current needs required 0 L   LDAs:   Peripheral IV 06/22/23 Left Antecubital (Active)   Site Assessment Clean, dry & intact 06/23/23 0026     Mobility: Independent  Pending ED orders: 1 unit RBCs  Present condition: stable      Electronically signed by Roberto Carlos Faye RN on 6/23/2023 at [address] [place] [address]  06/23/23 1760

## 2023-06-23 NOTE — PROGRESS NOTES
Pt states she still wants to leave AMA, pt given form after risks of leaving explained by physician and reinforced per writer. Pt states understanding; AMA form signed. Pt awake and alert, vss at time of departure.

## 2023-06-23 NOTE — ED NOTES
Patient to [id number] [place] [address], [address] [place] [address]  06/22/23 6051

## 2023-06-23 NOTE — ED NOTES
Patient handed off to Moraima Manrique RN. Blood transfusion paper documentation given to [facility].       Teresa Mustafa RN  06/23/23 3956

## 2023-06-23 NOTE — TELEPHONE ENCOUNTER
Received call from patient at 9:53 PM. She reports she has not felt well today. She affirms increased fatigue, lightheadedness, dizziness, near syncope, palpitations. Symptoms worsened after playing disc golf today. She affirms increased rectal bleeding; she has been having 5+ episodes per day. Patient with history of ulcerative colitis, recent colonoscopy per Dr. Tawny Camarena on 5/29/23 showed pancolitis. Discussed with patient due to ongoing blood loss and onset of above symptoms concern for worsened anemia requiring blood transfusion. Due to severity of symptoms recommend ED evaluation. Patient agreeable.

## 2023-06-23 NOTE — ED TRIAGE NOTES
Patient presents to the ED with chief complaint of UC flare up and shortness of breath. Patient reports her flare up has been going on for several days. She reports she was put on Prednisone today. Patient states she was playing disc golf this afternoon when she became short of breath.  EKG completed upon arrival.

## 2023-06-23 NOTE — PROGRESS NOTES
Chief Complaint:  The patient with with PMHx of ulcerative colitis, and normocytic anemia who presents to [facility] with bloody stools. History of present Illness: Kvng Magallon is a 40 y.o. female with PMHx of ulcerative colitis, and normocytic anemia who presents to [facility] with bloody stools. Patient admits to having 7 bloody stools today while at home with last one being around 1500 this afternoon. Endorses having shortness of breath and weakness while at home that has been increasing over the last couple of days. States that her abdomen is mildly tender rating it a 4/10 non-radiating and localized to mid abdomen. States nothing makes it better or worse. Patient states that she has been able to eat and drink well, with minor nausea no vomiting. Patient states that she was just scoped on 6/13/2023 and that she was expecting this to happen afterwards as it is common with her flares. She states that she was just started on prednisone 20 mg bid.  6/23/23: Patient known to the practice. Patient has a history of UC had been on Remicade in the past but had adverse side effects. Patient continues with bloody diarrhea, patient declined treatment with a biological. Patient has been started on prednisone as OP to control her symptoms until plan can be formulated. Patient also has a history of histoplasmosis and patient was told \" to never take a biological\". 5 ASA is not recommended as patient has moderate to severe UC. Patient has chronic anemia, last iron levels drawn in April, patient follows with hematology. Continue steroids,trend CBC,  recheck iron levels. Patient has received 1 unit of blood today and is to receive one more. Patient just had a recent EGD and Colonoscopy. Plan discharge when stable. Patient following with hematology and our office next week. Discussed tapering of steroids after permament plan for controlling UC is established.  Patient has seen [facility] clinic in

## 2023-06-23 NOTE — ED NOTES
15 minutes of blood transfusion complete. Vital signs charted (see flow sheets). No possible reactions observed. Respirations regular and unlabored. No distress noted. Call light within reach.       Nicolas Crowe RN  06/23/23 7443 48.3

## 2023-06-23 NOTE — CARE COORDINATION
6/23/23, 1:27 PM EDT      DISCHARGE PLANNING EVALUATION    Nya Bocanegra  Admitted: 6/22/2023  Hospital Day: 0    Location: -05/005-A Reason for admit: Acute blood loss anemia [D62]  Symptomatic anemia [D64.9]  Ulcerative pancolitis with rectal bleeding ([facility] [address] [K51.011]    Past Medical History:   Diagnosis Date    Anemia     Bruises easily     Headache     Histoplasmosis     Mental disorder     anxiety    Ulcerative colitis ([facility] [address]      Barriers to Discharge:   Anemia/Ulcerative Colitis  History: Ulcerative Colitis, Histoplasmosis, MVP  IV Steroids  H 5.3,   PCP: Yuriy Ko MD    Readmission Risk Low 0-14, Mod 15-19), High > 20: Readmission Risk Score: 9.9      Advance Directives:      Code Status: Full Code   Patient's Primary Decision Maker is: self      Patient Goals/Plan/Treatment Preferences: plans home alone, has OP IV Iron infusions    Transportation/Food Security/Housekeeping Addressed: No issues identified. 6/23/23, 1:30 PM EDT    Patient goals/plan/ treatment preferences discussed by  and . Patient goals/plan/ treatment preferences reviewed with patient/ family. Patient/ family verbalize understanding of discharge plan and are in agreement with goal/plan/treatment preferences. Understanding was demonstrated using the teach back method. AVS provided by RN at time of discharge, which includes all necessary medical information pertaining to the patients current course of illness, treatment, post-discharge goals of care, and treatment preferences.      Services At/After Discharge: None

## 2023-06-23 NOTE — ED NOTES
Dr. Bland Sos at bedside explaining blood transfusion. Blood consent signed.      Dolores Maxwell RN  06/22/23 7308

## 2023-06-23 NOTE — ED PROVIDER NOTES
Supervising Resident [de-identified] Attestation Statement  I performed a history and physical examination on the patient and discussed the management with the resident physician. I reviewed and agree with the findings and plan as documented in the resident physician note. This includes all diagnostic interpretations and treatment plans as written. I was present for the key portion of any procedures performed and the inclusive time noted in any critical care statement. Except as noted below. Brief H&P   This patient is a 40 y.o. Female with past medical history of ulcerative colitis, CKD, anemia, migraines, presenting to ED for rectal bleeding. Patient says she has a history of ulcerative colitis and has been having what she thinks is a flareup for a few months. Says that she is having bright red blood per rectum and says that it is worsening to the point of having 4-5 bloody bowel movements today. Says that she is having some shortness of breath exertion and some lightheadedness. Denies any syncope. Denies any chest pain. Denies any dysuria. Says she only has pain when she is defecating. Denies any dysuria. Says that her GI doctor sent her prescription for prednisone but she only filled it today. Denies any headache fever chills. Denies any cough chest pain. Denies any vomiting or nausea. Says that she is required blood transfusions in the past due to her ulcerative colitis. On my examination, patient is pale and tachycardic. No abdominal tenderness to palpation, no guarding no rebound no rigidity. Lungs clear to auscultation.   Bilateral radial pulses equal.        Diagnostics and Treatments      LABS / RADIOLOGY:     Labs Reviewed   CBC WITH AUTO DIFFERENTIAL - Abnormal; Notable for the following components:       Result Value    WBC 12.2 (*)     RBC 2.47 (*)     Hemoglobin 5.3 (*)     Hematocrit 18.5 (*)     MCV 74.9 (*)     MCH 21.5 (*)     MCHC 28.6 (*)     RDW-CV 20.7 (*)     RDW-SD 55.8 (*)
signed. Parts of this transcriptions may have been dictated by use of voice recognition software and electronically transcribed, and parts may have been transcribed with the assistance of an ED scribe. The transcription may contain errors not detected in proofreading. Please refer to my supervising physician's documentation if my documentation differs.     Electronically Signed: Aníbal Tolbert DO, 06/23/23, 1:05 AM      Aníbal Tolbert DO  Resident  06/23/23 7357

## 2023-06-23 NOTE — DISCHARGE SUMMARY
Crystals, UA NONE SEEN NONE SEEN    Renal Epithelial, UA NONE SEEN NONE SEEN    Yeast, UA NONE SEEN NONE SEEN    CASTS 2 NONE SEEN NONE SEEN /lpf    MISCELLANEOUS 2 NONE SEEN    MRSA by PCR    Collection Time: 06/23/23  3:00 AM    Specimen: Nares   Result Value Ref Range    MRSA SCREEN RT-PCR NEGATIVE    Lactic Acid    Collection Time: 06/23/23  3:53 AM   Result Value Ref Range    Lactic Acid 0.7 0.5 - 2.0 mmol/L   Basic Metabolic Panel    Collection Time: 06/23/23  3:53 AM   Result Value Ref Range    Sodium 137 135 - 145 meq/L    Potassium 5.0 3.5 - 5.2 meq/L    Chloride 106 98 - 111 meq/L    CO2 18 (L) 23 - 33 meq/L    Glucose 120 (H) 70 - 108 mg/dL    BUN 12 7 - 22 mg/dL    Creatinine 0.9 0.4 - 1.2 mg/dL    Calcium 8.3 (L) 8.5 - 10.5 mg/dL   Magnesium    Collection Time: 06/23/23  3:53 AM   Result Value Ref Range    Magnesium 1.9 1.6 - 2.4 mg/dL   Phosphorus    Collection Time: 06/23/23  3:53 AM   Result Value Ref Range    Phosphorus 3.6 2.4 - 4.7 mg/dL   Calcium, Ionized    Collection Time: 06/23/23  3:53 AM   Result Value Ref Range    Calcium, Ionized 1.16 1.12 - 1.32 mmol/L   Anion Gap    Collection Time: 06/23/23  3:53 AM   Result Value Ref Range    Anion Gap 13.0 8.0 - 16.0 meq/L   Glomerular Filtration Rate, Estimated    Collection Time: 06/23/23  3:53 AM   Result Value Ref Range    Est, Glom Filt Rate >60 >60 ml/min/1.73m2   Hemoglobin and Hematocrit    Collection Time: 06/23/23  5:21 AM   Result Value Ref Range    Hemoglobin 6.0 (LL) 12.0 - 16.0 gm/dl    Hematocrit 20.3 (LL) 37.0 - 47.0 %   Sedimentation Rate    Collection Time: 06/23/23  5:21 AM   Result Value Ref Range    Sed Rate 35 (H) 0 - 20 mm/hr   POCT glucose    Collection Time: 06/23/23  9:32 AM   Result Value Ref Range    POC Glucose 170 (H) 70 - 108 mg/dl   POCT glucose    Collection Time: 06/23/23 11:28 AM   Result Value Ref Range    POC Glucose 115 (H) 70 - 108 mg/dl   Hemoglobin and Hematocrit    Collection Time: 06/23/23  1:49 PM   Result

## 2023-06-25 LAB — BACTERIA SPEC AEROBE CULT: NORMAL

## 2023-06-27 ENCOUNTER — OFFICE VISIT (OUTPATIENT)
Dept: ONCOLOGY | Age: 38
End: 2023-06-27
Payer: COMMERCIAL

## 2023-06-27 ENCOUNTER — HOSPITAL ENCOUNTER (OUTPATIENT)
Dept: INFUSION THERAPY | Age: 38
Discharge: HOME | End: 2023-06-27
Payer: COMMERCIAL

## 2023-06-27 VITALS
HEIGHT: 65 IN | RESPIRATION RATE: 16 BRPM | BODY MASS INDEX: 24.49 KG/M2 | SYSTOLIC BLOOD PRESSURE: 132 MMHG | WEIGHT: 147 LBS | TEMPERATURE: 98.3 F | OXYGEN SATURATION: 100 % | HEART RATE: 88 BPM | DIASTOLIC BLOOD PRESSURE: 78 MMHG

## 2023-06-27 VITALS
DIASTOLIC BLOOD PRESSURE: 78 MMHG | HEART RATE: 88 BPM | RESPIRATION RATE: 16 BRPM | TEMPERATURE: 98.3 F | SYSTOLIC BLOOD PRESSURE: 132 MMHG

## 2023-06-27 DIAGNOSIS — K52.9 INFLAMMATORY BOWEL DISEASE: ICD-10-CM

## 2023-06-27 DIAGNOSIS — D50.0 IRON DEFICIENCY ANEMIA DUE TO CHRONIC BLOOD LOSS: ICD-10-CM

## 2023-06-27 DIAGNOSIS — D64.9 NORMOCYTIC ANEMIA: Primary | ICD-10-CM

## 2023-06-27 DIAGNOSIS — D64.9 NORMOCYTIC ANEMIA: ICD-10-CM

## 2023-06-27 LAB
ABSOLUTE IMMATURE GRANULOCYTE: 0.03 THOU/MM3 (ref 0–0.07)
BASOPHILS ABSOLUTE: 0 THOU/MM3 (ref 0–0.1)
BASOPHILS NFR BLD AUTO: 1 % (ref 0–3)
EOSINOPHIL NFR BLD AUTO: 2 % (ref 0–4)
EOSINOPHILS ABSOLUTE: 0.2 THOU/MM3 (ref 0–0.4)
ERYTHROCYTE [DISTWIDTH] IN BLOOD BY AUTOMATED COUNT: 21.6 % (ref 11.5–14.5)
FERRITIN SERPL IA-MCNC: 9 NG/ML (ref 10–291)
FOLATE SERPL-MCNC: 8 NG/ML (ref 4.8–24.2)
HCT VFR BLD AUTO: 32.3 % (ref 37–47)
HGB BLD-MCNC: 9.6 GM/DL (ref 12–16)
IMMATURE GRANULOCYTES: 0 %
IRON SERPL-MCNC: 20 UG/DL (ref 50–170)
LYMPHOCYTES ABSOLUTE: 2.2 THOU/MM3 (ref 1–4.8)
LYMPHOCYTES NFR BLD AUTO: 27 % (ref 15–47)
MCH RBC QN AUTO: 23.7 PG (ref 26–33)
MCHC RBC AUTO-ENTMCNC: 29.7 GM/DL (ref 32.2–35.5)
MCV RBC AUTO: 80 FL (ref 81–99)
MONOCYTES ABSOLUTE: 0.7 THOU/MM3 (ref 0.4–1.3)
MONOCYTES NFR BLD AUTO: 9 % (ref 0–12)
NEUTROPHILS NFR BLD AUTO: 61 % (ref 43–75)
PLATELET # BLD AUTO: 609 THOU/MM3 (ref 130–400)
PMV BLD AUTO: 8.9 FL (ref 9.4–12.4)
RBC # BLD AUTO: 4.05 MILL/MM3 (ref 4.2–5.4)
SEGMENTED NEUTROPHILS ABSOLUTE COUNT: 5 THOU/MM3 (ref 1.8–7.7)
TIBC SERPL-MCNC: 390 UG/DL (ref 171–450)
VIT B12 SERPL-MCNC: 1370 PG/ML (ref 211–911)
WBC # BLD AUTO: 8.2 THOU/MM3 (ref 4.8–10.8)

## 2023-06-27 PROCEDURE — 82746 ASSAY OF FOLIC ACID SERUM: CPT

## 2023-06-27 PROCEDURE — 99211 OFF/OP EST MAY X REQ PHY/QHP: CPT

## 2023-06-27 PROCEDURE — 99214 OFFICE O/P EST MOD 30 MIN: CPT | Performed by: PHYSICIAN ASSISTANT

## 2023-06-27 PROCEDURE — 36415 COLL VENOUS BLD VENIPUNCTURE: CPT

## 2023-06-27 PROCEDURE — 82728 ASSAY OF FERRITIN: CPT

## 2023-06-27 PROCEDURE — 85025 COMPLETE CBC W/AUTO DIFF WBC: CPT

## 2023-06-27 PROCEDURE — 83550 IRON BINDING TEST: CPT

## 2023-06-27 PROCEDURE — 82607 VITAMIN B-12: CPT

## 2023-06-27 PROCEDURE — 83540 ASSAY OF IRON: CPT

## 2023-07-03 RX ORDER — ALBUTEROL SULFATE 90 UG/1
4 AEROSOL, METERED RESPIRATORY (INHALATION) PRN
Status: CANCELLED | OUTPATIENT
Start: 2023-07-03

## 2023-07-03 RX ORDER — EPINEPHRINE 1 MG/ML
0.3 INJECTION, SOLUTION, CONCENTRATE INTRAVENOUS PRN
Status: CANCELLED | OUTPATIENT
Start: 2023-07-03

## 2023-07-03 RX ORDER — SODIUM CHLORIDE 9 MG/ML
INJECTION, SOLUTION INTRAVENOUS CONTINUOUS
Status: CANCELLED | OUTPATIENT
Start: 2023-07-03

## 2023-07-03 RX ORDER — DIPHENHYDRAMINE HYDROCHLORIDE 50 MG/ML
50 INJECTION INTRAMUSCULAR; INTRAVENOUS
Status: CANCELLED | OUTPATIENT
Start: 2023-07-03

## 2023-07-03 RX ORDER — SODIUM CHLORIDE 0.9 % (FLUSH) 0.9 %
5-40 SYRINGE (ML) INJECTION PRN
Status: CANCELLED | OUTPATIENT
Start: 2023-07-03

## 2023-07-03 RX ORDER — ONDANSETRON 2 MG/ML
8 INJECTION INTRAMUSCULAR; INTRAVENOUS
Status: CANCELLED | OUTPATIENT
Start: 2023-07-03

## 2023-07-03 RX ORDER — SODIUM CHLORIDE 9 MG/ML
5-250 INJECTION, SOLUTION INTRAVENOUS PRN
Status: CANCELLED | OUTPATIENT
Start: 2023-07-03

## 2023-07-03 RX ORDER — HEPARIN SODIUM (PORCINE) LOCK FLUSH IV SOLN 100 UNIT/ML 100 UNIT/ML
500 SOLUTION INTRAVENOUS PRN
Status: CANCELLED | OUTPATIENT
Start: 2023-07-03

## 2023-07-03 RX ORDER — FAMOTIDINE 10 MG/ML
20 INJECTION, SOLUTION INTRAVENOUS
Status: CANCELLED | OUTPATIENT
Start: 2023-07-03

## 2023-07-03 RX ORDER — ACETAMINOPHEN 325 MG/1
650 TABLET ORAL
Status: CANCELLED | OUTPATIENT
Start: 2023-07-03

## 2023-07-13 ENCOUNTER — HOSPITAL ENCOUNTER (OUTPATIENT)
Dept: INFUSION THERAPY | Age: 38
Discharge: HOME | End: 2023-07-13
Payer: COMMERCIAL

## 2023-07-13 VITALS
HEART RATE: 75 BPM | SYSTOLIC BLOOD PRESSURE: 129 MMHG | DIASTOLIC BLOOD PRESSURE: 84 MMHG | RESPIRATION RATE: 18 BRPM | WEIGHT: 145.6 LBS | TEMPERATURE: 98.7 F | OXYGEN SATURATION: 98 % | HEIGHT: 65 IN | BODY MASS INDEX: 24.26 KG/M2

## 2023-07-13 DIAGNOSIS — D50.0 IRON DEFICIENCY ANEMIA DUE TO CHRONIC BLOOD LOSS: Primary | ICD-10-CM

## 2023-07-13 DIAGNOSIS — K51.011 ULCERATIVE PANCOLITIS WITH RECTAL BLEEDING (HCC): ICD-10-CM

## 2023-07-13 DIAGNOSIS — R06.02 SHORTNESS OF BREATH: ICD-10-CM

## 2023-07-13 DIAGNOSIS — R53.83 OTHER FATIGUE: ICD-10-CM

## 2023-07-13 DIAGNOSIS — D50.0 CHRONIC BLOOD LOSS ANEMIA: ICD-10-CM

## 2023-07-13 PROCEDURE — 6360000002 HC RX W HCPCS: Performed by: PHYSICIAN ASSISTANT

## 2023-07-13 PROCEDURE — 96365 THER/PROPH/DIAG IV INF INIT: CPT

## 2023-07-13 PROCEDURE — 96366 THER/PROPH/DIAG IV INF ADDON: CPT

## 2023-07-13 PROCEDURE — 2580000003 HC RX 258: Performed by: PHYSICIAN ASSISTANT

## 2023-07-13 RX ORDER — SODIUM CHLORIDE 0.9 % (FLUSH) 0.9 %
5-40 SYRINGE (ML) INJECTION PRN
Status: CANCELLED | OUTPATIENT
Start: 2023-07-20

## 2023-07-13 RX ORDER — ACETAMINOPHEN 325 MG/1
650 TABLET ORAL
Status: CANCELLED | OUTPATIENT
Start: 2023-07-20

## 2023-07-13 RX ORDER — SODIUM CHLORIDE 9 MG/ML
5-250 INJECTION, SOLUTION INTRAVENOUS PRN
Status: DISCONTINUED | OUTPATIENT
Start: 2023-07-13 | End: 2023-07-14 | Stop reason: HOSPADM

## 2023-07-13 RX ORDER — SODIUM CHLORIDE 9 MG/ML
5-250 INJECTION, SOLUTION INTRAVENOUS PRN
Status: CANCELLED | OUTPATIENT
Start: 2023-07-20

## 2023-07-13 RX ORDER — HEPARIN SODIUM 100 [USP'U]/ML
500 INJECTION, SOLUTION INTRAVENOUS PRN
Status: CANCELLED | OUTPATIENT
Start: 2023-07-20

## 2023-07-13 RX ORDER — DIPHENHYDRAMINE HYDROCHLORIDE 50 MG/ML
50 INJECTION INTRAMUSCULAR; INTRAVENOUS
Status: CANCELLED | OUTPATIENT
Start: 2023-07-20

## 2023-07-13 RX ORDER — EPINEPHRINE 1 MG/ML
0.3 INJECTION, SOLUTION INTRAMUSCULAR; SUBCUTANEOUS PRN
Status: CANCELLED | OUTPATIENT
Start: 2023-07-20

## 2023-07-13 RX ORDER — SODIUM CHLORIDE 9 MG/ML
INJECTION, SOLUTION INTRAVENOUS CONTINUOUS
Status: CANCELLED | OUTPATIENT
Start: 2023-07-20

## 2023-07-13 RX ORDER — ONDANSETRON 2 MG/ML
8 INJECTION INTRAMUSCULAR; INTRAVENOUS
Status: CANCELLED | OUTPATIENT
Start: 2023-07-20

## 2023-07-13 RX ORDER — ALBUTEROL SULFATE 90 UG/1
4 AEROSOL, METERED RESPIRATORY (INHALATION) PRN
Status: CANCELLED | OUTPATIENT
Start: 2023-07-20

## 2023-07-13 RX ADMIN — SODIUM CHLORIDE 200 ML/HR: 9 INJECTION, SOLUTION INTRAVENOUS at 14:20

## 2023-07-13 RX ADMIN — IRON SUCROSE 300 MG: 20 INJECTION, SOLUTION INTRAVENOUS at 14:21

## 2023-07-13 NOTE — PLAN OF CARE
Problem: Intellectual/Education/Knowledge Deficit  Goal: Teaching initiated upon admission  Outcome: Adequate for Discharge  Note: Patient verbalizes understanding to verbal information given on venofer, including action and possible side effects. Aware to call MD if develop complications. Intervention: Verbal/written education provided  Note: Verbal education provided on venofer infusion prior to administration. Problem: Discharge Planning  Goal: Knowledge of discharge instructions  Description: Knowledge of discharge instructions     Outcome: Adequate for Discharge  Note: Patient verbalizes understanding of discharge instructions, follow up appointment, and when to call physician if needed   Intervention: Discharge to appropriate level of care  Note: Discharge instructions given to pt and reviewed. Follow up appointments discussed. Problem: Falls - Risk of:  Goal: Will remain free from falls  Description: Will remain free from falls  Outcome: Adequate for Discharge  Note: Patient free of falls this visit. Intervention: Assess risk factors for falls  Note: Fall risks assessed. Precautions discussed. Call light within reach. Care plan reviewed with patient. Patient verbalizes understanding of the plan of care and contributes to goal setting.

## 2023-07-13 NOTE — DISCHARGE INSTRUCTIONS
Please contact your Oncologist if you have any questions regarding the venofer that you received today.

## 2023-07-20 ENCOUNTER — HOSPITAL ENCOUNTER (OUTPATIENT)
Dept: INFUSION THERAPY | Age: 38
Discharge: HOME | End: 2023-07-20
Payer: COMMERCIAL

## 2023-07-20 VITALS
WEIGHT: 145 LBS | HEART RATE: 75 BPM | SYSTOLIC BLOOD PRESSURE: 120 MMHG | TEMPERATURE: 98.1 F | OXYGEN SATURATION: 97 % | RESPIRATION RATE: 16 BRPM | HEIGHT: 65 IN | BODY MASS INDEX: 24.16 KG/M2 | DIASTOLIC BLOOD PRESSURE: 76 MMHG

## 2023-07-20 DIAGNOSIS — D50.0 CHRONIC BLOOD LOSS ANEMIA: ICD-10-CM

## 2023-07-20 DIAGNOSIS — K51.011 ULCERATIVE PANCOLITIS WITH RECTAL BLEEDING (HCC): ICD-10-CM

## 2023-07-20 DIAGNOSIS — R53.83 OTHER FATIGUE: ICD-10-CM

## 2023-07-20 DIAGNOSIS — R06.02 SHORTNESS OF BREATH: ICD-10-CM

## 2023-07-20 DIAGNOSIS — D50.0 IRON DEFICIENCY ANEMIA DUE TO CHRONIC BLOOD LOSS: Primary | ICD-10-CM

## 2023-07-20 PROCEDURE — 6360000002 HC RX W HCPCS: Performed by: PHYSICIAN ASSISTANT

## 2023-07-20 PROCEDURE — 96365 THER/PROPH/DIAG IV INF INIT: CPT

## 2023-07-20 PROCEDURE — 96366 THER/PROPH/DIAG IV INF ADDON: CPT

## 2023-07-20 PROCEDURE — 2580000003 HC RX 258: Performed by: PHYSICIAN ASSISTANT

## 2023-07-20 RX ORDER — SODIUM CHLORIDE 9 MG/ML
5-250 INJECTION, SOLUTION INTRAVENOUS PRN
Status: DISCONTINUED | OUTPATIENT
Start: 2023-07-20 | End: 2023-07-21 | Stop reason: HOSPADM

## 2023-07-20 RX ORDER — ACETAMINOPHEN 325 MG/1
650 TABLET ORAL
Status: CANCELLED | OUTPATIENT
Start: 2023-07-27

## 2023-07-20 RX ORDER — EPINEPHRINE 1 MG/ML
0.3 INJECTION, SOLUTION INTRAMUSCULAR; SUBCUTANEOUS PRN
Status: CANCELLED | OUTPATIENT
Start: 2023-07-27

## 2023-07-20 RX ORDER — DIPHENHYDRAMINE HYDROCHLORIDE 50 MG/ML
50 INJECTION INTRAMUSCULAR; INTRAVENOUS
Status: CANCELLED | OUTPATIENT
Start: 2023-07-27

## 2023-07-20 RX ORDER — SODIUM CHLORIDE 9 MG/ML
5-250 INJECTION, SOLUTION INTRAVENOUS PRN
Status: CANCELLED | OUTPATIENT
Start: 2023-07-27

## 2023-07-20 RX ORDER — SODIUM CHLORIDE 9 MG/ML
INJECTION, SOLUTION INTRAVENOUS CONTINUOUS
Status: CANCELLED | OUTPATIENT
Start: 2023-07-27

## 2023-07-20 RX ORDER — ONDANSETRON 2 MG/ML
8 INJECTION INTRAMUSCULAR; INTRAVENOUS
Status: CANCELLED | OUTPATIENT
Start: 2023-07-27

## 2023-07-20 RX ORDER — ALBUTEROL SULFATE 90 UG/1
4 AEROSOL, METERED RESPIRATORY (INHALATION) PRN
Status: CANCELLED | OUTPATIENT
Start: 2023-07-27

## 2023-07-20 RX ORDER — SODIUM CHLORIDE 0.9 % (FLUSH) 0.9 %
5-40 SYRINGE (ML) INJECTION PRN
Status: CANCELLED | OUTPATIENT
Start: 2023-07-27

## 2023-07-20 RX ORDER — HEPARIN 100 UNIT/ML
500 SYRINGE INTRAVENOUS PRN
Status: CANCELLED | OUTPATIENT
Start: 2023-07-27

## 2023-07-20 RX ADMIN — IRON SUCROSE 300 MG: 20 INJECTION, SOLUTION INTRAVENOUS at 14:34

## 2023-07-20 RX ADMIN — SODIUM CHLORIDE 20 ML/HR: 9 INJECTION, SOLUTION INTRAVENOUS at 14:32

## 2023-07-20 NOTE — PLAN OF CARE
Problem: Intellectual/Education/Knowledge Deficit  Goal: Teaching initiated upon admission  Note: Patient verbalizes understanding to verbal information given on venofer,action and possible side effects. Aware to call MD if develop complications. Intervention: Verbal/written education provided  Note: Venofer reviewed, patient verbalizes understanding of medication being administered and potential side effects. Problem: Discharge Planning  Goal: Knowledge of discharge instructions  Description: Knowledge of discharge instructions     Note: Verbalize understanding of discharge instructions, follow up appointments, and when to call Physician. Intervention: Discharge to appropriate level of care  Note: Discuss understanding of discharge instructions, follow up appointments and when to call Physician. Problem: Falls - Risk of:  Goal: Will remain free from falls  Description: Will remain free from falls  Outcome: Adequate for Discharge  Note: Free from falls while in O.P. Oncology. Intervention: Assess risk factors for falls  Note: Discussed the need to use the call light for assistance when getting up to ambulate. Care plan reviewed with patient. Patient verbalize understanding of the plan of care and contribute to goal setting.

## 2023-07-28 ENCOUNTER — HOSPITAL ENCOUNTER (OUTPATIENT)
Dept: INFUSION THERAPY | Age: 38
Discharge: HOME | End: 2023-07-28
Payer: COMMERCIAL

## 2023-07-28 ENCOUNTER — OFFICE VISIT (OUTPATIENT)
Dept: ONCOLOGY | Age: 38
End: 2023-07-28
Payer: COMMERCIAL

## 2023-07-28 VITALS
HEART RATE: 81 BPM | TEMPERATURE: 97.9 F | BODY MASS INDEX: 24.62 KG/M2 | HEIGHT: 65 IN | DIASTOLIC BLOOD PRESSURE: 66 MMHG | WEIGHT: 147.8 LBS | OXYGEN SATURATION: 100 % | SYSTOLIC BLOOD PRESSURE: 123 MMHG | RESPIRATION RATE: 20 BRPM

## 2023-07-28 VITALS
RESPIRATION RATE: 16 BRPM | SYSTOLIC BLOOD PRESSURE: 113 MMHG | TEMPERATURE: 97.9 F | HEART RATE: 78 BPM | OXYGEN SATURATION: 98 % | DIASTOLIC BLOOD PRESSURE: 75 MMHG

## 2023-07-28 DIAGNOSIS — R53.83 OTHER FATIGUE: ICD-10-CM

## 2023-07-28 DIAGNOSIS — R06.02 SHORTNESS OF BREATH: ICD-10-CM

## 2023-07-28 DIAGNOSIS — D64.9 NORMOCYTIC ANEMIA: Primary | ICD-10-CM

## 2023-07-28 DIAGNOSIS — K52.9 INFLAMMATORY BOWEL DISEASE: ICD-10-CM

## 2023-07-28 DIAGNOSIS — D50.0 IRON DEFICIENCY ANEMIA DUE TO CHRONIC BLOOD LOSS: ICD-10-CM

## 2023-07-28 DIAGNOSIS — D50.0 CHRONIC BLOOD LOSS ANEMIA: ICD-10-CM

## 2023-07-28 DIAGNOSIS — D50.0 IRON DEFICIENCY ANEMIA DUE TO CHRONIC BLOOD LOSS: Primary | ICD-10-CM

## 2023-07-28 DIAGNOSIS — D64.9 NORMOCYTIC ANEMIA: ICD-10-CM

## 2023-07-28 DIAGNOSIS — K51.011 ULCERATIVE PANCOLITIS WITH RECTAL BLEEDING (HCC): ICD-10-CM

## 2023-07-28 LAB
ABSOLUTE IMMATURE GRANULOCYTE: 0.02 THOU/MM3 (ref 0–0.07)
BASOPHILS ABSOLUTE: 0 THOU/MM3 (ref 0–0.1)
BASOPHILS NFR BLD AUTO: 1 % (ref 0–3)
EOSINOPHIL NFR BLD AUTO: 3 % (ref 0–4)
EOSINOPHILS ABSOLUTE: 0.2 THOU/MM3 (ref 0–0.4)
ERYTHROCYTE [DISTWIDTH] IN BLOOD BY AUTOMATED COUNT: 25.5 % (ref 11.5–14.5)
FERRITIN SERPL IA-MCNC: 193 NG/ML (ref 10–291)
HCT VFR BLD AUTO: 35.1 % (ref 37–47)
HGB BLD-MCNC: 10.6 GM/DL (ref 12–16)
IMMATURE GRANULOCYTES: 0 %
IRON SERPL-MCNC: 115 UG/DL (ref 50–170)
LYMPHOCYTES ABSOLUTE: 2.4 THOU/MM3 (ref 1–4.8)
LYMPHOCYTES NFR BLD AUTO: 40 % (ref 15–47)
MCH RBC QN AUTO: 24.4 PG (ref 26–33)
MCHC RBC AUTO-ENTMCNC: 30.2 GM/DL (ref 32.2–35.5)
MCV RBC AUTO: 81 FL (ref 81–99)
MONOCYTES ABSOLUTE: 0.7 THOU/MM3 (ref 0.4–1.3)
MONOCYTES NFR BLD AUTO: 11 % (ref 0–12)
NEUTROPHILS NFR BLD AUTO: 45 % (ref 43–75)
PLATELET # BLD AUTO: 347 THOU/MM3 (ref 130–400)
PMV BLD AUTO: 9.6 FL (ref 9.4–12.4)
RBC # BLD AUTO: 4.34 MILL/MM3 (ref 4.2–5.4)
SEGMENTED NEUTROPHILS ABSOLUTE COUNT: 2.7 THOU/MM3 (ref 1.8–7.7)
TIBC SERPL-MCNC: 331 UG/DL (ref 171–450)
WBC # BLD AUTO: 6.1 THOU/MM3 (ref 4.8–10.8)

## 2023-07-28 PROCEDURE — 96365 THER/PROPH/DIAG IV INF INIT: CPT

## 2023-07-28 PROCEDURE — 82728 ASSAY OF FERRITIN: CPT

## 2023-07-28 PROCEDURE — 96366 THER/PROPH/DIAG IV INF ADDON: CPT

## 2023-07-28 PROCEDURE — 99214 OFFICE O/P EST MOD 30 MIN: CPT | Performed by: PHYSICIAN ASSISTANT

## 2023-07-28 PROCEDURE — 99211 OFF/OP EST MAY X REQ PHY/QHP: CPT

## 2023-07-28 PROCEDURE — 2580000003 HC RX 258: Performed by: PHYSICIAN ASSISTANT

## 2023-07-28 PROCEDURE — 83540 ASSAY OF IRON: CPT

## 2023-07-28 PROCEDURE — 6360000002 HC RX W HCPCS: Performed by: PHYSICIAN ASSISTANT

## 2023-07-28 PROCEDURE — 83550 IRON BINDING TEST: CPT

## 2023-07-28 PROCEDURE — 36415 COLL VENOUS BLD VENIPUNCTURE: CPT

## 2023-07-28 PROCEDURE — 85025 COMPLETE CBC W/AUTO DIFF WBC: CPT

## 2023-07-28 RX ORDER — HEPARIN 100 UNIT/ML
500 SYRINGE INTRAVENOUS PRN
OUTPATIENT
Start: 2023-07-28

## 2023-07-28 RX ORDER — SODIUM CHLORIDE 9 MG/ML
INJECTION, SOLUTION INTRAVENOUS CONTINUOUS
OUTPATIENT
Start: 2023-07-28

## 2023-07-28 RX ORDER — ALBUTEROL SULFATE 90 UG/1
4 AEROSOL, METERED RESPIRATORY (INHALATION) PRN
OUTPATIENT
Start: 2023-07-28

## 2023-07-28 RX ORDER — SODIUM CHLORIDE 9 MG/ML
5-250 INJECTION, SOLUTION INTRAVENOUS PRN
Status: DISCONTINUED | OUTPATIENT
Start: 2023-07-28 | End: 2023-07-29 | Stop reason: HOSPADM

## 2023-07-28 RX ORDER — SODIUM CHLORIDE 0.9 % (FLUSH) 0.9 %
5-40 SYRINGE (ML) INJECTION PRN
OUTPATIENT
Start: 2023-07-28

## 2023-07-28 RX ORDER — SODIUM CHLORIDE 9 MG/ML
5-250 INJECTION, SOLUTION INTRAVENOUS PRN
OUTPATIENT
Start: 2023-07-28

## 2023-07-28 RX ORDER — ONDANSETRON 2 MG/ML
8 INJECTION INTRAMUSCULAR; INTRAVENOUS
OUTPATIENT
Start: 2023-07-28

## 2023-07-28 RX ORDER — ACETAMINOPHEN 325 MG/1
650 TABLET ORAL
OUTPATIENT
Start: 2023-07-28

## 2023-07-28 RX ORDER — EPINEPHRINE 1 MG/ML
0.3 INJECTION, SOLUTION INTRAMUSCULAR; SUBCUTANEOUS PRN
OUTPATIENT
Start: 2023-07-28

## 2023-07-28 RX ORDER — DIPHENHYDRAMINE HYDROCHLORIDE 50 MG/ML
50 INJECTION INTRAMUSCULAR; INTRAVENOUS
OUTPATIENT
Start: 2023-07-28

## 2023-07-28 RX ADMIN — IRON SUCROSE 300 MG: 20 INJECTION, SOLUTION INTRAVENOUS at 12:42

## 2023-07-28 RX ADMIN — SODIUM CHLORIDE 25 ML/HR: 9 INJECTION, SOLUTION INTRAVENOUS at 12:41

## 2023-07-28 NOTE — PATIENT INSTRUCTIONS
Will proceed with IV Venofer today  Return to clinic in 6-7 weeks  Labs on RTC  Please call for questions or concerns.

## 2023-07-28 NOTE — PLAN OF CARE
Problem: Intellectual/Education/Knowledge Deficit  Goal: Teaching initiated upon admission  Outcome: Progressing  Note: Patient verbalizes understanding to verbal information given on venofer,action and possible side effects. Aware to call MD if develop complications. Problem: Discharge Planning  Goal: Knowledge of discharge instructions  Description: Knowledge of discharge instructions     Outcome: Progressing  Note: Verbalize understanding of discharge instructions, follow up appointments, and when to call Physician. Intervention: Discharge to appropriate level of care  Note: Discuss understanding of discharge instructions, follow up appointments and when to call Physician. Problem: Falls - Risk of:  Goal: Will remain free from falls  Description: Will remain free from falls  Outcome: Progressing  Note: No falls occurred with visit today. Intervention: Assess risk factors for falls  Note: Discussed the need to use the call light for assistance when getting up to ambulate. Care plan reviewed with patient. Patient verbalizes understanding of the plan of care and contributes to goal setting.

## 2023-07-28 NOTE — PROGRESS NOTES
[facility] [address] [place] [address] Noah [phone]  [address] [facility] [address] [address]  Dept: [phone]  Dept Fax: [phone]: [phone]      Visit Date:7/28/2023     Terry Jeans is a 40 y.o. female who presents today for:   Chief Complaint   Patient presents with    Follow-up     Normocytic anemia        HPI:   Terry Jeans is a 40 y.o. female followed in the office for anemia. She has history of ulcerative colitis and has chronic blood loss. She develops iron deficiency anemia related to blood loss and intermittently requires IV iron therapy. 3/31/23: She was last seen in the clinic in February 2021. She was recommended IV Venofer at that time. Her last infusion was in March 2021. She was lost to follow up. She called the clinic reporting increased hematochezia. The patient is here with her significant other today. She states 2 weeks ago she had increased bloody stools. She has contacted GI and has an appointment today. The patient reports increased stress that she is currently going through divorce. She reports increase in headaches and has been using aspirin and NSAIDs. Denies melena or other abnormal bleeding. She admits she has been drinking alcohol more than she has in the past.    She received IV Venofer x3 in April 2023. She underwent EGD and colonoscopy on 5/20/23 per Dr. Wallace Eagle showing gastritis. Colonoscopy revealed pancolitis more prominent on left side. Admitted in June 2023 with rectal bleeding. Found to have drop in Hgb to 5.3, Hct 18. 5. she received 2 units of PRBCs and started on IV steroids. Interval History 7/28/2023:   The patient is here for follow up evaluation. She was seen on 6/27/23 and recommended IV Venofer x3 following recent hospital admission. She will receive 3rd infusion today. The patient states she is feeling much better since receiving IV iron. She affirms improvement in fatigue, dyspnea on exertion and palpitations.   She denies any chest

## 2023-07-28 NOTE — DISCHARGE INSTRUCTIONS
Will proceed with IV Venofer today  Return to clinic in 6-7 weeks  Labs on RTC  Please call for questions or concerns    Please contact your Oncologist if you have any questions regarding the VENOFER that you received today. Patient instructed if experience any of the symptoms following today's INFUSION / to notify MD immediately or go to emergency department.     * dizziness/lightheadedness  *acute nausea/vomiting - not relieved with medication  *headache - not relieved from Tylenol/pain medication  *chest pain/pressure  *rash/itching  *shortness of breath        Drink fluids - 48oz fluids daily  Call if develop fever/ chills/ signs or symptoms of infection

## 2023-09-15 ENCOUNTER — HOSPITAL ENCOUNTER (OUTPATIENT)
Dept: INFUSION THERAPY | Age: 38
Discharge: HOME | End: 2023-09-15
Payer: COMMERCIAL

## 2023-09-15 ENCOUNTER — OFFICE VISIT (OUTPATIENT)
Dept: ONCOLOGY | Age: 38
End: 2023-09-15
Payer: COMMERCIAL

## 2023-09-15 VITALS
HEART RATE: 72 BPM | HEIGHT: 65 IN | BODY MASS INDEX: 24.46 KG/M2 | DIASTOLIC BLOOD PRESSURE: 80 MMHG | RESPIRATION RATE: 16 BRPM | WEIGHT: 146.8 LBS | TEMPERATURE: 98.1 F | SYSTOLIC BLOOD PRESSURE: 112 MMHG | OXYGEN SATURATION: 98 %

## 2023-09-15 VITALS
DIASTOLIC BLOOD PRESSURE: 80 MMHG | OXYGEN SATURATION: 98 % | TEMPERATURE: 98.1 F | RESPIRATION RATE: 16 BRPM | SYSTOLIC BLOOD PRESSURE: 112 MMHG | HEART RATE: 72 BPM

## 2023-09-15 DIAGNOSIS — D64.9 NORMOCYTIC ANEMIA: ICD-10-CM

## 2023-09-15 DIAGNOSIS — D64.9 NORMOCYTIC ANEMIA: Primary | ICD-10-CM

## 2023-09-15 DIAGNOSIS — D50.0 IRON DEFICIENCY ANEMIA DUE TO CHRONIC BLOOD LOSS: ICD-10-CM

## 2023-09-15 DIAGNOSIS — K52.9 INFLAMMATORY BOWEL DISEASE: ICD-10-CM

## 2023-09-15 LAB
ABSOLUTE IMMATURE GRANULOCYTE: 0 THOU/MM3 (ref 0–0.07)
BASOPHILS ABSOLUTE: 0 THOU/MM3 (ref 0–0.1)
BASOPHILS NFR BLD AUTO: 1 % (ref 0–3)
EOSINOPHIL NFR BLD AUTO: 9 % (ref 0–4)
EOSINOPHILS ABSOLUTE: 0.4 THOU/MM3 (ref 0–0.4)
ERYTHROCYTE [DISTWIDTH] IN BLOOD BY AUTOMATED COUNT: 19.1 % (ref 11.5–14.5)
FERRITIN SERPL IA-MCNC: 101 NG/ML (ref 10–291)
HCT VFR BLD AUTO: 39.4 % (ref 37–47)
HGB BLD-MCNC: 12.4 GM/DL (ref 12–16)
IMMATURE GRANULOCYTES: 0 %
IRON SERPL-MCNC: 137 UG/DL (ref 50–170)
LYMPHOCYTES ABSOLUTE: 1.3 THOU/MM3 (ref 1–4.8)
LYMPHOCYTES NFR BLD AUTO: 28 % (ref 15–47)
MCH RBC QN AUTO: 27.3 PG (ref 26–33)
MCHC RBC AUTO-ENTMCNC: 31.5 GM/DL (ref 32.2–35.5)
MCV RBC AUTO: 87 FL (ref 81–99)
MONOCYTES ABSOLUTE: 0.4 THOU/MM3 (ref 0.4–1.3)
MONOCYTES NFR BLD AUTO: 9 % (ref 0–12)
NEUTROPHILS NFR BLD AUTO: 54 % (ref 43–75)
PLATELET # BLD AUTO: 297 THOU/MM3 (ref 130–400)
PMV BLD AUTO: 9.5 FL (ref 9.4–12.4)
RBC # BLD AUTO: 4.54 MILL/MM3 (ref 4.2–5.4)
SEGMENTED NEUTROPHILS ABSOLUTE COUNT: 2.5 THOU/MM3 (ref 1.8–7.7)
TIBC SERPL-MCNC: 250 UG/DL (ref 171–450)
WBC # BLD AUTO: 4.7 THOU/MM3 (ref 4.8–10.8)

## 2023-09-15 PROCEDURE — 85025 COMPLETE CBC W/AUTO DIFF WBC: CPT

## 2023-09-15 PROCEDURE — 82728 ASSAY OF FERRITIN: CPT

## 2023-09-15 PROCEDURE — 36415 COLL VENOUS BLD VENIPUNCTURE: CPT

## 2023-09-15 PROCEDURE — 83540 ASSAY OF IRON: CPT

## 2023-09-15 PROCEDURE — 99213 OFFICE O/P EST LOW 20 MIN: CPT | Performed by: PHYSICIAN ASSISTANT

## 2023-09-15 PROCEDURE — 83550 IRON BINDING TEST: CPT

## 2023-09-15 PROCEDURE — 99211 OFF/OP EST MAY X REQ PHY/QHP: CPT

## 2023-09-15 NOTE — PROGRESS NOTES
[facility] [address] [place] [address] [address]  [address] [facility] [address] [address]  Dept: [phone]  Dept Fax: [phone]: [phone]      Visit Date:9/15/2023     Severino Merritt is a 45 y.o. female who presents today for:   Chief Complaint   Patient presents with    Follow-up     Normocytic anemia        HPI:   Severino Merritt is a 45 y.o. female followed in the office for anemia. She has history of ulcerative colitis and has chronic blood loss. She develops iron deficiency anemia related to blood loss and intermittently requires IV iron therapy. 3/31/23: She was last seen in the clinic in February 2021. She was recommended IV Venofer at that time. Her last infusion was in March 2021. She was lost to follow up. She called the clinic reporting increased hematochezia. The patient is here with her significant other today. She states 2 weeks ago she had increased bloody stools. She has contacted GI and has an appointment today. The patient reports increased stress that she is currently going through divorce. She reports increase in headaches and has been using aspirin and NSAIDs. Denies melena or other abnormal bleeding. She admits she has been drinking alcohol more than she has in the past.    She received IV Venofer x3 in April 2023. She underwent EGD and colonoscopy on 5/20/23 per Dr. Trish Sams showing gastritis. Colonoscopy revealed pancolitis more prominent on left side. Admitted in June 2023 with rectal bleeding. Found to have drop in Hgb to 5.3, Hct 18. 5. she received 2 units of PRBCs and started on IV steroids. 7/28/23 - completed IV Venofer x3. Interval History 9/15/2023:   The patient is here for follow up evaluation of iron deficiency anemia. She completed 3rd infusion of IV Venofer at last visit on 7/28/23. She reports over the last 6 weeks she has been feeling much better. She denies persistent fatigue.   She states symptoms of dyspnea on exertion, palpitations have

## 2023-09-15 NOTE — PATIENT INSTRUCTIONS
Return to clinic in 3 months  Labs on RTC: CBC, ferritin, iron, TIBC   Patient instructed to take prenatal vitamin   Please call for questions or concerns.

## 2023-12-26 ENCOUNTER — TELEPHONE (OUTPATIENT)
Dept: ONCOLOGY | Age: 38
End: 2023-12-26